# Patient Record
Sex: FEMALE
[De-identification: names, ages, dates, MRNs, and addresses within clinical notes are randomized per-mention and may not be internally consistent; named-entity substitution may affect disease eponyms.]

---

## 2017-03-07 ENCOUNTER — CHARTING TRANS (OUTPATIENT)
Dept: OTHER | Age: 57
End: 2017-03-07

## 2017-03-07 ENCOUNTER — CHARTING TRANS (OUTPATIENT)
Dept: FAMILY MEDICINE | Age: 57
End: 2017-03-07

## 2017-03-31 ENCOUNTER — IMAGING SERVICES (OUTPATIENT)
Dept: OTHER | Age: 57
End: 2017-03-31

## 2017-04-05 ENCOUNTER — IMAGING SERVICES (OUTPATIENT)
Dept: OTHER | Age: 57
End: 2017-04-05

## 2017-04-07 ENCOUNTER — LAB SERVICES (OUTPATIENT)
Dept: OTHER | Age: 57
End: 2017-04-07

## 2017-04-07 ENCOUNTER — CHARTING TRANS (OUTPATIENT)
Dept: OTHER | Age: 57
End: 2017-04-07

## 2017-04-08 ENCOUNTER — LAB SERVICES (OUTPATIENT)
Dept: OTHER | Age: 57
End: 2017-04-08

## 2017-04-08 LAB
25(OH)D3 SERPL-MCNC: 48.3 NG/ML (ref 30–100)
ALBUMIN SERPL BCG-MCNC: 4 G/DL (ref 3.6–5.1)
ALP SERPL-CCNC: 69 U/L (ref 45–105)
ALT SERPL W/O P-5'-P-CCNC: 27 U/L (ref 15–43)
AST SERPL-CCNC: 26 U/L (ref 14–43)
BASOPHIL %: 0.4 % (ref 0–1.2)
BASOPHIL ABSOLUTE #: 0 10*3/UL (ref 0–0.1)
BILIRUB SERPL-MCNC: 0.6 MG/DL (ref 0–1.3)
BUN SERPL-MCNC: 15 MG/DL (ref 7–20)
CALCIUM SERPL-MCNC: 9 MG/DL (ref 8.6–10.6)
CHLORIDE SERPL-SCNC: 106 MMOL/L (ref 96–107)
CHOLEST SERPL-MCNC: 170 MG/DL (ref 140–200)
CREATININE, SERUM: 0.7 MG/DL (ref 0.5–1.4)
DIFFERENTIAL TYPE: NORMAL
EOSINOPHIL %: 1.2 % (ref 0–10)
EOSINOPHIL ABSOLUTE #: 0.1 10*3/UL (ref 0–0.5)
GFR SERPL CREATININE-BSD FRML MDRD: >60 ML/MIN/{1.73M2}
GFR SERPL CREATININE-BSD FRML MDRD: >60 ML/MIN/{1.73M2}
GLUCOSE P FAST SERPL-MCNC: 91 MG/DL (ref 60–100)
HCO3 SERPL-SCNC: 29 MMOL/L (ref 22–32)
HDLC SERPL-MCNC: 53 MG/DL
HEMATOCRIT: 40.8 % (ref 34–45)
HEMOGLOBIN: 13.8 G/DL (ref 11.2–15.7)
LDLC SERPL CALC-MCNC: 104 MG/DL (ref 30–100)
LYMPH PERCENT: 39.2 % (ref 20.5–51.1)
LYMPHOCYTE ABSOLUTE #: 1.9 10*3/UL (ref 1.2–3.4)
MEAN CORPUSCULAR HGB CONCENTRATION: 33.8 % (ref 32–36)
MEAN CORPUSCULAR HGB: 27.5 PG (ref 27–34)
MEAN CORPUSCULAR VOLUME: 81.4 FL (ref 79–95)
MEAN PLATELET VOLUME: 10.1 FL (ref 8.6–12.4)
MONOCYTE ABSOLUTE #: 0.4 10*3/UL (ref 0.2–0.9)
MONOCYTE PERCENT: 7.5 % (ref 4.3–12.9)
NEUTROPHIL ABSOLUTE #: 2.5 10*3/UL (ref 1.4–6.5)
NEUTROPHIL PERCENT: 51.7 % (ref 34–73.5)
PLATELET COUNT: 213 10*3/UL (ref 150–400)
POTASSIUM SERPL-SCNC: 4.5 MMOL/L (ref 3.5–5.3)
PROT SERPL-MCNC: 7.1 G/DL (ref 6.4–8.5)
RED BLOOD CELL COUNT: 5.01 10*6/UL (ref 3.7–5.2)
RED CELL DISTRIBUTION WIDTH: 12.9 % (ref 11.3–14.8)
SODIUM SERPL-SCNC: 143 MMOL/L (ref 136–146)
TRIGL SERPL-MCNC: 66 MG/DL (ref 0–200)
TSH SERPL DL<=0.05 MIU/L-ACNC: 1.08 M[IU]/L (ref 0.3–4.82)
WHITE BLOOD CELL COUNT: 4.8 10*3/UL (ref 4–10)

## 2017-04-11 ENCOUNTER — CHARTING TRANS (OUTPATIENT)
Dept: OTHER | Age: 57
End: 2017-04-11

## 2017-04-11 LAB — AP REPORT: NORMAL

## 2017-04-25 ENCOUNTER — IMAGING SERVICES (OUTPATIENT)
Dept: OTHER | Age: 57
End: 2017-04-25

## 2017-05-24 ENCOUNTER — CHARTING TRANS (OUTPATIENT)
Dept: URGENT CARE | Age: 57
End: 2017-05-24

## 2017-06-22 ENCOUNTER — CHARTING TRANS (OUTPATIENT)
Dept: OTHER | Age: 57
End: 2017-06-22

## 2017-06-22 ENCOUNTER — LAB SERVICES (OUTPATIENT)
Dept: OTHER | Age: 57
End: 2017-06-22

## 2017-06-22 LAB
CRP SERPL-MCNC: <0.5 MG/DL (ref 0–1)
SEDIMENTATION RATE, RBC: 10 MM/H (ref 0–20)

## 2017-06-23 ENCOUNTER — CHARTING TRANS (OUTPATIENT)
Dept: OTHER | Age: 57
End: 2017-06-23

## 2017-06-23 ENCOUNTER — LAB SERVICES (OUTPATIENT)
Dept: OTHER | Age: 57
End: 2017-06-23

## 2017-06-26 LAB — H PYLORI AG STL QL IA: POSITIVE

## 2017-06-28 ENCOUNTER — CHARTING TRANS (OUTPATIENT)
Dept: OTHER | Age: 57
End: 2017-06-28

## 2017-10-06 ENCOUNTER — IMAGING SERVICES (OUTPATIENT)
Dept: OTHER | Age: 57
End: 2017-10-06

## 2017-10-06 ENCOUNTER — CHARTING TRANS (OUTPATIENT)
Dept: OTHER | Age: 57
End: 2017-10-06

## 2017-10-23 ENCOUNTER — CHARTING TRANS (OUTPATIENT)
Dept: OTOLARYNGOLOGY | Age: 57
End: 2017-10-23

## 2017-10-23 ENCOUNTER — CHARTING TRANS (OUTPATIENT)
Dept: OTHER | Age: 57
End: 2017-10-23

## 2017-11-03 ENCOUNTER — CHARTING TRANS (OUTPATIENT)
Dept: OTHER | Age: 57
End: 2017-11-03

## 2017-11-16 ENCOUNTER — CHARTING TRANS (OUTPATIENT)
Dept: SURGERY | Age: 57
End: 2017-11-16

## 2018-11-27 VITALS — BODY MASS INDEX: 27.48 KG/M2 | WEIGHT: 140 LBS | HEIGHT: 60 IN

## 2018-11-28 VITALS
HEART RATE: 68 BPM | TEMPERATURE: 99.1 F | WEIGHT: 140 LBS | HEIGHT: 59 IN | SYSTOLIC BLOOD PRESSURE: 118 MMHG | BODY MASS INDEX: 28.22 KG/M2 | DIASTOLIC BLOOD PRESSURE: 68 MMHG

## 2018-11-28 VITALS
HEART RATE: 68 BPM | DIASTOLIC BLOOD PRESSURE: 70 MMHG | WEIGHT: 145 LBS | HEIGHT: 60 IN | RESPIRATION RATE: 12 BRPM | SYSTOLIC BLOOD PRESSURE: 110 MMHG | BODY MASS INDEX: 28.47 KG/M2

## 2018-11-28 VITALS
OXYGEN SATURATION: 98 % | RESPIRATION RATE: 16 BRPM | SYSTOLIC BLOOD PRESSURE: 114 MMHG | DIASTOLIC BLOOD PRESSURE: 70 MMHG | HEART RATE: 68 BPM | TEMPERATURE: 98.5 F

## 2018-11-28 VITALS — WEIGHT: 140 LBS

## 2019-06-03 ENCOUNTER — APPOINTMENT (OUTPATIENT)
Dept: INTERNAL MEDICINE | Facility: CLINIC | Age: 59
End: 2019-06-03

## 2019-08-05 ENCOUNTER — RESULT REVIEW (OUTPATIENT)
Age: 59
End: 2019-08-05

## 2019-08-05 ENCOUNTER — INPATIENT (INPATIENT)
Facility: HOSPITAL | Age: 59
LOS: 1 days | Discharge: HOME | End: 2019-08-07
Attending: SURGERY | Admitting: SURGERY
Payer: MEDICAID

## 2019-08-05 VITALS
DIASTOLIC BLOOD PRESSURE: 67 MMHG | TEMPERATURE: 98 F | HEART RATE: 68 BPM | RESPIRATION RATE: 18 BRPM | SYSTOLIC BLOOD PRESSURE: 119 MMHG | OXYGEN SATURATION: 99 %

## 2019-08-05 LAB
ALBUMIN SERPL ELPH-MCNC: 4.1 G/DL — SIGNIFICANT CHANGE UP (ref 3.5–5.2)
ALP SERPL-CCNC: 84 U/L — SIGNIFICANT CHANGE UP (ref 30–115)
ALT FLD-CCNC: 90 U/L — HIGH (ref 0–41)
ANION GAP SERPL CALC-SCNC: 14 MMOL/L — SIGNIFICANT CHANGE UP (ref 7–14)
APPEARANCE UR: CLEAR — SIGNIFICANT CHANGE UP
APTT BLD: 26.2 SEC — LOW (ref 27–39.2)
AST SERPL-CCNC: 202 U/L — HIGH (ref 0–41)
BASOPHILS # BLD AUTO: 0.03 K/UL — SIGNIFICANT CHANGE UP (ref 0–0.2)
BASOPHILS NFR BLD AUTO: 0.4 % — SIGNIFICANT CHANGE UP (ref 0–1)
BILIRUB DIRECT SERPL-MCNC: 0.3 MG/DL — HIGH (ref 0–0.2)
BILIRUB INDIRECT FLD-MCNC: 0.6 MG/DL — SIGNIFICANT CHANGE UP (ref 0.2–1.2)
BILIRUB SERPL-MCNC: 0.9 MG/DL — SIGNIFICANT CHANGE UP (ref 0.2–1.2)
BILIRUB UR-MCNC: NEGATIVE — SIGNIFICANT CHANGE UP
BLD GP AB SCN SERPL QL: SIGNIFICANT CHANGE UP
BUN SERPL-MCNC: 12 MG/DL — SIGNIFICANT CHANGE UP (ref 10–20)
CALCIUM SERPL-MCNC: 9.1 MG/DL — SIGNIFICANT CHANGE UP (ref 8.5–10.1)
CHLORIDE SERPL-SCNC: 103 MMOL/L — SIGNIFICANT CHANGE UP (ref 98–110)
CO2 SERPL-SCNC: 23 MMOL/L — SIGNIFICANT CHANGE UP (ref 17–32)
COLOR SPEC: SIGNIFICANT CHANGE UP
CREAT SERPL-MCNC: 0.8 MG/DL — SIGNIFICANT CHANGE UP (ref 0.7–1.5)
DIFF PNL FLD: NEGATIVE — SIGNIFICANT CHANGE UP
EOSINOPHIL # BLD AUTO: 0.01 K/UL — SIGNIFICANT CHANGE UP (ref 0–0.7)
EOSINOPHIL NFR BLD AUTO: 0.1 % — SIGNIFICANT CHANGE UP (ref 0–8)
GLUCOSE SERPL-MCNC: 130 MG/DL — HIGH (ref 70–99)
GLUCOSE UR QL: NEGATIVE — SIGNIFICANT CHANGE UP
HCT VFR BLD CALC: 39.9 % — SIGNIFICANT CHANGE UP (ref 37–47)
HGB BLD-MCNC: 12.8 G/DL — SIGNIFICANT CHANGE UP (ref 12–16)
IMM GRANULOCYTES NFR BLD AUTO: 0.3 % — SIGNIFICANT CHANGE UP (ref 0.1–0.3)
INR BLD: 0.99 RATIO — SIGNIFICANT CHANGE UP (ref 0.65–1.3)
KETONES UR-MCNC: NEGATIVE — SIGNIFICANT CHANGE UP
LEUKOCYTE ESTERASE UR-ACNC: NEGATIVE — SIGNIFICANT CHANGE UP
LIDOCAIN IGE QN: 24 U/L — SIGNIFICANT CHANGE UP (ref 7–60)
LYMPHOCYTES # BLD AUTO: 0.72 K/UL — LOW (ref 1.2–3.4)
LYMPHOCYTES # BLD AUTO: 10 % — LOW (ref 20.5–51.1)
MCHC RBC-ENTMCNC: 26.9 PG — LOW (ref 27–31)
MCHC RBC-ENTMCNC: 32.1 G/DL — SIGNIFICANT CHANGE UP (ref 32–37)
MCV RBC AUTO: 83.8 FL — SIGNIFICANT CHANGE UP (ref 81–99)
MONOCYTES # BLD AUTO: 0.31 K/UL — SIGNIFICANT CHANGE UP (ref 0.1–0.6)
MONOCYTES NFR BLD AUTO: 4.3 % — SIGNIFICANT CHANGE UP (ref 1.7–9.3)
NEUTROPHILS # BLD AUTO: 6.09 K/UL — SIGNIFICANT CHANGE UP (ref 1.4–6.5)
NEUTROPHILS NFR BLD AUTO: 84.9 % — HIGH (ref 42.2–75.2)
NITRITE UR-MCNC: NEGATIVE — SIGNIFICANT CHANGE UP
NRBC # BLD: 0 /100 WBCS — SIGNIFICANT CHANGE UP (ref 0–0)
PH UR: 7.5 — SIGNIFICANT CHANGE UP (ref 5–8)
PLATELET # BLD AUTO: 196 K/UL — SIGNIFICANT CHANGE UP (ref 130–400)
POTASSIUM SERPL-MCNC: 5 MMOL/L — SIGNIFICANT CHANGE UP (ref 3.5–5)
POTASSIUM SERPL-SCNC: 5 MMOL/L — SIGNIFICANT CHANGE UP (ref 3.5–5)
PROT SERPL-MCNC: 7.6 G/DL — SIGNIFICANT CHANGE UP (ref 6–8)
PROT UR-MCNC: NEGATIVE — SIGNIFICANT CHANGE UP
PROTHROM AB SERPL-ACNC: 11.4 SEC — SIGNIFICANT CHANGE UP (ref 9.95–12.87)
RBC # BLD: 4.76 M/UL — SIGNIFICANT CHANGE UP (ref 4.2–5.4)
RBC # FLD: 12.7 % — SIGNIFICANT CHANGE UP (ref 11.5–14.5)
SODIUM SERPL-SCNC: 140 MMOL/L — SIGNIFICANT CHANGE UP (ref 135–146)
SP GR SPEC: 1.01 — LOW (ref 1.01–1.02)
UROBILINOGEN FLD QL: SIGNIFICANT CHANGE UP
WBC # BLD: 7.18 K/UL — SIGNIFICANT CHANGE UP (ref 4.8–10.8)
WBC # FLD AUTO: 7.18 K/UL — SIGNIFICANT CHANGE UP (ref 4.8–10.8)

## 2019-08-05 PROCEDURE — 76705 ECHO EXAM OF ABDOMEN: CPT | Mod: 26

## 2019-08-05 PROCEDURE — 71045 X-RAY EXAM CHEST 1 VIEW: CPT | Mod: 26

## 2019-08-05 PROCEDURE — 88304 TISSUE EXAM BY PATHOLOGIST: CPT | Mod: 26

## 2019-08-05 PROCEDURE — 47562 LAPAROSCOPIC CHOLECYSTECTOMY: CPT

## 2019-08-05 PROCEDURE — 99285 EMERGENCY DEPT VISIT HI MDM: CPT

## 2019-08-05 PROCEDURE — 93010 ELECTROCARDIOGRAM REPORT: CPT

## 2019-08-05 PROCEDURE — 99223 1ST HOSP IP/OBS HIGH 75: CPT | Mod: 57

## 2019-08-05 RX ORDER — ACETAMINOPHEN 500 MG
650 TABLET ORAL ONCE
Refills: 0 | Status: DISCONTINUED | OUTPATIENT
Start: 2019-08-05 | End: 2019-08-06

## 2019-08-05 RX ORDER — MORPHINE SULFATE 50 MG/1
4 CAPSULE, EXTENDED RELEASE ORAL
Refills: 0 | Status: DISCONTINUED | OUTPATIENT
Start: 2019-08-05 | End: 2019-08-06

## 2019-08-05 RX ORDER — SODIUM CHLORIDE 9 MG/ML
1000 INJECTION INTRAMUSCULAR; INTRAVENOUS; SUBCUTANEOUS ONCE
Refills: 0 | Status: COMPLETED | OUTPATIENT
Start: 2019-08-05 | End: 2019-08-05

## 2019-08-05 RX ORDER — MORPHINE SULFATE 50 MG/1
2 CAPSULE, EXTENDED RELEASE ORAL EVERY 6 HOURS
Refills: 0 | Status: DISCONTINUED | OUTPATIENT
Start: 2019-08-05 | End: 2019-08-07

## 2019-08-05 RX ORDER — IBUPROFEN 200 MG
600 TABLET ORAL EVERY 8 HOURS
Refills: 0 | Status: DISCONTINUED | OUTPATIENT
Start: 2019-08-05 | End: 2019-08-05

## 2019-08-05 RX ORDER — OXYCODONE AND ACETAMINOPHEN 5; 325 MG/1; MG/1
1 TABLET ORAL ONCE
Refills: 0 | Status: DISCONTINUED | OUTPATIENT
Start: 2019-08-05 | End: 2019-08-06

## 2019-08-05 RX ORDER — CIPROFLOXACIN LACTATE 400MG/40ML
400 VIAL (ML) INTRAVENOUS EVERY 12 HOURS
Refills: 0 | Status: CANCELLED | OUTPATIENT
Start: 2019-08-06 | End: 2019-08-05

## 2019-08-05 RX ORDER — ONDANSETRON 8 MG/1
4 TABLET, FILM COATED ORAL EVERY 4 HOURS
Refills: 0 | Status: DISCONTINUED | OUTPATIENT
Start: 2019-08-05 | End: 2019-08-06

## 2019-08-05 RX ORDER — SODIUM CHLORIDE 9 MG/ML
1000 INJECTION, SOLUTION INTRAVENOUS
Refills: 0 | Status: DISCONTINUED | OUTPATIENT
Start: 2019-08-05 | End: 2019-08-06

## 2019-08-05 RX ORDER — SODIUM CHLORIDE 9 MG/ML
1000 INJECTION, SOLUTION INTRAVENOUS
Refills: 0 | Status: DISCONTINUED | OUTPATIENT
Start: 2019-08-05 | End: 2019-08-05

## 2019-08-05 RX ORDER — METRONIDAZOLE 500 MG
500 TABLET ORAL EVERY 8 HOURS
Refills: 0 | Status: DISCONTINUED | OUTPATIENT
Start: 2019-08-05 | End: 2019-08-05

## 2019-08-05 RX ORDER — CIPROFLOXACIN LACTATE 400MG/40ML
VIAL (ML) INTRAVENOUS
Refills: 0 | Status: DISCONTINUED | OUTPATIENT
Start: 2019-08-05 | End: 2019-08-05

## 2019-08-05 RX ORDER — ONDANSETRON 8 MG/1
4 TABLET, FILM COATED ORAL ONCE
Refills: 0 | Status: COMPLETED | OUTPATIENT
Start: 2019-08-05 | End: 2019-08-05

## 2019-08-05 RX ORDER — METRONIDAZOLE 500 MG
500 TABLET ORAL ONCE
Refills: 0 | Status: COMPLETED | OUTPATIENT
Start: 2019-08-05 | End: 2019-08-05

## 2019-08-05 RX ORDER — ACETAMINOPHEN 500 MG
650 TABLET ORAL EVERY 6 HOURS
Refills: 0 | Status: DISCONTINUED | OUTPATIENT
Start: 2019-08-05 | End: 2019-08-07

## 2019-08-05 RX ORDER — HEPARIN SODIUM 5000 [USP'U]/ML
5000 INJECTION INTRAVENOUS; SUBCUTANEOUS EVERY 8 HOURS
Refills: 0 | Status: DISCONTINUED | OUTPATIENT
Start: 2019-08-05 | End: 2019-08-07

## 2019-08-05 RX ORDER — SENNA PLUS 8.6 MG/1
2 TABLET ORAL AT BEDTIME
Refills: 0 | Status: DISCONTINUED | OUTPATIENT
Start: 2019-08-05 | End: 2019-08-07

## 2019-08-05 RX ORDER — HEPARIN SODIUM 5000 [USP'U]/ML
5000 INJECTION INTRAVENOUS; SUBCUTANEOUS EVERY 8 HOURS
Refills: 0 | Status: DISCONTINUED | OUTPATIENT
Start: 2019-08-05 | End: 2019-08-05

## 2019-08-05 RX ORDER — MORPHINE SULFATE 50 MG/1
4 CAPSULE, EXTENDED RELEASE ORAL EVERY 4 HOURS
Refills: 0 | Status: DISCONTINUED | OUTPATIENT
Start: 2019-08-05 | End: 2019-08-05

## 2019-08-05 RX ORDER — IBUPROFEN 200 MG
400 TABLET ORAL EVERY 6 HOURS
Refills: 0 | Status: DISCONTINUED | OUTPATIENT
Start: 2019-08-05 | End: 2019-08-07

## 2019-08-05 RX ORDER — MORPHINE SULFATE 50 MG/1
4 CAPSULE, EXTENDED RELEASE ORAL ONCE
Refills: 0 | Status: DISCONTINUED | OUTPATIENT
Start: 2019-08-05 | End: 2019-08-05

## 2019-08-05 RX ORDER — CIPROFLOXACIN LACTATE 400MG/40ML
400 VIAL (ML) INTRAVENOUS ONCE
Refills: 0 | Status: COMPLETED | OUTPATIENT
Start: 2019-08-05 | End: 2019-08-05

## 2019-08-05 RX ORDER — METRONIDAZOLE 500 MG
TABLET ORAL
Refills: 0 | Status: DISCONTINUED | OUTPATIENT
Start: 2019-08-05 | End: 2019-08-05

## 2019-08-05 RX ORDER — DOCUSATE SODIUM 100 MG
100 CAPSULE ORAL THREE TIMES A DAY
Refills: 0 | Status: DISCONTINUED | OUTPATIENT
Start: 2019-08-05 | End: 2019-08-07

## 2019-08-05 RX ORDER — ONDANSETRON 8 MG/1
4 TABLET, FILM COATED ORAL EVERY 6 HOURS
Refills: 0 | Status: DISCONTINUED | OUTPATIENT
Start: 2019-08-05 | End: 2019-08-05

## 2019-08-05 RX ORDER — ACETAMINOPHEN 500 MG
650 TABLET ORAL EVERY 6 HOURS
Refills: 0 | Status: DISCONTINUED | OUTPATIENT
Start: 2019-08-05 | End: 2019-08-05

## 2019-08-05 RX ADMIN — Medication 100 MILLIGRAM(S): at 18:49

## 2019-08-05 RX ADMIN — ONDANSETRON 4 MILLIGRAM(S): 8 TABLET, FILM COATED ORAL at 10:50

## 2019-08-05 RX ADMIN — MORPHINE SULFATE 4 MILLIGRAM(S): 50 CAPSULE, EXTENDED RELEASE ORAL at 11:15

## 2019-08-05 RX ADMIN — SODIUM CHLORIDE 100 MILLILITER(S): 9 INJECTION, SOLUTION INTRAVENOUS at 22:53

## 2019-08-05 RX ADMIN — SODIUM CHLORIDE 1000 MILLILITER(S): 9 INJECTION INTRAMUSCULAR; INTRAVENOUS; SUBCUTANEOUS at 10:51

## 2019-08-05 RX ADMIN — SODIUM CHLORIDE 100 MILLILITER(S): 9 INJECTION, SOLUTION INTRAVENOUS at 18:49

## 2019-08-05 RX ADMIN — Medication 200 MILLIGRAM(S): at 18:49

## 2019-08-05 RX ADMIN — MORPHINE SULFATE 4 MILLIGRAM(S): 50 CAPSULE, EXTENDED RELEASE ORAL at 10:51

## 2019-08-05 RX ADMIN — MORPHINE SULFATE 4 MILLIGRAM(S): 50 CAPSULE, EXTENDED RELEASE ORAL at 23:39

## 2019-08-05 RX ADMIN — MORPHINE SULFATE 4 MILLIGRAM(S): 50 CAPSULE, EXTENDED RELEASE ORAL at 23:55

## 2019-08-05 NOTE — ED PROVIDER NOTE - PHYSICAL EXAMINATION
Vital Signs: I have reviewed the initial vital signs.  Constitutional: NAD, well-nourished, appears stated age, no acute distress.  HEENT: Airway patent, moist MM, no erythema/swelling/deformity of oral structures. EOMI, PERRLA.  CV: regular rate, regular rhythm, well-perfused extremities, 2+ b/l DP and radial pulses equal.  Lungs: BCTA, no increased WOB.  ABD: (+) RUQ tenderness with (+) michel sign, no guarding or rebound, no pulsatile mass, no hernias.   MSK: Neck supple, nontender, nl ROM, no stepoff. Chest nontender. Back nontender in TLS spine or to b/l bony structures or flanks. Ext nontender, nl rom, no deformity.   INTEG: Skin warm, dry, no rash.  NEURO: A&Ox3, normal strength, nl sensation throughout, normal speech.   PSYCH: Calm, cooperative, normal affect and interaction.

## 2019-08-05 NOTE — CHART NOTE - NSCHARTNOTEFT_GEN_A_CORE
PACU ANESTHESIA ADMISSION NOTE      Procedure: Laparoscopic cholecystectomy with intraoperative cholangiography    Post op diagnosis:  Acute cholecystitis      ____  Intubated  TV:______       Rate: ______      FiO2: ______    ___x_  Patent Airway    ____  Full return of protective reflexes    ____  Full recovery from anesthesia / back to baseline     Vitals:   T:     98      R:   12               BP:    128/91               Sat:  100%                  P:  92      Mental Status:  __x__ Awake   _____ Alert   _____ Drowsy   _____ Sedated    Nausea/Vomiting:  _x___ NO  ______Yes,   See Post - Op Orders          Pain Scale (0-10):  _____    Treatment: ____ None    ___x_ See Post - Op/PCA Orders    Post - Operative Fluids:   ____ Oral   ___x_ See Post - Op Orders    Plan: Discharge:   ____Home       __x___Floor     _____Critical Care    _____  Other:_________________    Comments: Uneventful intraoperative course. No anesthesia issues or complications noted.  Patient stable upon arrival to PACU. Report given to RN. Discharge when criteria met.

## 2019-08-05 NOTE — H&P ADULT - NSHPPHYSICALEXAM_GEN_ALL_CORE
PHYSICAL EXAM:    General: NAD, well-developed  Chest/Lung: Clear to auscultation bilaterally; No wheeze  Heart: Regular rate and rhythm; No murmurs, rubs, or gallops  Abdomen: Soft, mild RUQ, epigastric tenderness, Nondistended; Bowel sounds present  Psych: AAOx3  Skin: No rashes or lesions

## 2019-08-05 NOTE — ED PROVIDER NOTE - OBJECTIVE STATEMENT
58 year old female, denies pmhx, presenting with a several hour history of RUQ and epigastric abdominal pain that radiates to her back. States the pain is sharp, severe, no palliative or provocative factors. States she has had this before intermittently in the past s/p eating but never this severe. Associated with nausea but no vomiting. Otherwise denies fevers, headache, vision changes, weakness/numbness, confusion, URI symptoms, neck pain, chest pain, back pain, dyspnea, cough, palpitations, vomiting, diarrhea, constipation, blood in stool/dark stools, urinary symptoms, vaginal bleeding/discharge, leg swelling, rash, recent travel or sick contacts.

## 2019-08-05 NOTE — H&P ADULT - NSHPLABSRESULTS_GEN_ALL_CORE
Labs:  CAPILLARY BLOOD GLUCOSE               12.8   7.18  )-----------( 196      ( 05 Aug 2019 11:16 )             39.9       Auto Neutrophil %: 84.9 % (19 @ 11:16)  Auto Immature Granulocyte %: 0.3 % (19 @ 11:16)        140  |  103  |  12  ----------------------------<  130<H>  5.0   |  23  |  0.8      Calcium, Total Serum: 9.1 mg/dL (19 @ 10:20)      LFTs:             7.6  | 0.9  | 202      ------------------[84      ( 05 Aug 2019 10:20 )  4.1  | 0.3  | 90          Lipase:24       Urinalysis Basic - ( 05 Aug 2019 13:30 )    Color: Light Yellow / Appearance: Clear / S.009 / pH: x  Gluc: x / Ketone: Negative  / Bili: Negative / Urobili: <2 mg/dL   Blood: x / Protein: Negative / Nitrite: Negative   Leuk Esterase: Negative / RBC: x / WBC x   Sq Epi: x / Non Sq Epi: x / Bacteria: x    < from: US Abdomen Limited (19 @ 12:21) >    GALLBLADDER/BILIARY TREE:  Cholelithiasis. No wall thickening or   pericholecystic fluid.  Positive sonographic Orourke's sign (patient was   focally tender when scanning the gallbladder). No intrahepatic biliary   ductal dilatation. The common bile duct measures 4 mm, which is normal.   IMPRESSION:  Cholelithiasis with a positive sonographic Orourke's sign - however, given   no gallbladder wall thickening or pericholecystic fluid, findings are   equivocal for acute cholecystitis.    No biliary ductal dilatation.      < end of copied text >

## 2019-08-05 NOTE — H&P ADULT - HISTORY OF PRESENT ILLNESS
58F biliary colic, denies other PMH presenting to the ED with RUQ pain since 3AM accompanied by nausea. Per the patient, she has been having intermittent colicky RUQ pain since December which usually last 30 minutes then subside. She reports this episode being the worse occurrence.   In the ED, she states that her pain has improved since presentation, but has not completely diminished.

## 2019-08-05 NOTE — ED ADULT TRIAGE NOTE - ESI TRIAGE ACUITY LEVEL, MLM
----- Message from Jennifer Herring MD sent at 7/16/2019  8:12 PM CDT -----  Please call patient: Recent lipid panel is excellent.  Her LDL is only 43.  However, because of recurrence of coronary artery lesions requiring stenting, I would prefer to continue the same dose of atorvastatin 80 mg daily.  Thanks.  
Message given to patient  
3

## 2019-08-05 NOTE — H&P ADULT - ASSESSMENT
58F PMH biliary colic presenting to the ED with worsening RUQ pain since 3AM with mild transaminitis, RUQ u/s with positive sonographic Orourke's sign.    Plan:  - admit to surgery  - NPO, IVF, abx  - pain control  - preop for OR, lap roel w IOC, poss open  - DVT ppx  - trend LFTs

## 2019-08-05 NOTE — BRIEF OPERATIVE NOTE - OPERATION/FINDINGS
Acute cholecystitis, normal IOC without filling defects and with normal biliary anatomy with full visualization of biliary tree

## 2019-08-05 NOTE — ED PROVIDER NOTE - CLINICAL SUMMARY MEDICAL DECISION MAKING FREE TEXT BOX
Patient presented with RUQ abdominal pain, tender on exam but otherwise afebrile, HD stable, overall well appearing. Obtained labs which showed mildly elevated LFTs (AST > ALT) but normal alk phos. Otherwise no WBC count, normal lipase. RUQ US showed no gallbladder wall thickening or pericholecystic fluid, CBD not dilated, but (+) sonographic michel sign and therefore equivocal for cholecystitis. Spoke with surgery who came to see patient and they recommended admission to their service. Patient agreeable with plan. HD stable at time of admission.

## 2019-08-05 NOTE — ED PROVIDER NOTE - PROGRESS NOTE DETAILS
Patient seen and evaluated by me. Labs/imaging ordered. Started on IVF, Given morphine/zofran for symptomatic control. Will re-eval pending work up. RUQ US results back - patient feeling better after tx. Read states there is (+) cholelithiasis but no gallbladder wall thickening or pericholecystic fluid. There is a (+) michel sign and therefore saying it is equivocal for acute cholecystitis. Spoke with surgery who is going to see patient.

## 2019-08-05 NOTE — BRIEF OPERATIVE NOTE - NSICDXBRIEFPROCEDURE_GEN_ALL_CORE_FT
PROCEDURES:  Laparoscopic cholecystectomy with intraoperative cholangiography 05-Aug-2019 22:56:49  Art Crabtree

## 2019-08-06 ENCOUNTER — TRANSCRIPTION ENCOUNTER (OUTPATIENT)
Age: 59
End: 2019-08-06

## 2019-08-06 LAB
ALBUMIN SERPL ELPH-MCNC: 3.7 G/DL — SIGNIFICANT CHANGE UP (ref 3.5–5.2)
ALBUMIN SERPL ELPH-MCNC: 3.8 G/DL — SIGNIFICANT CHANGE UP (ref 3.5–5.2)
ALP SERPL-CCNC: 94 U/L — SIGNIFICANT CHANGE UP (ref 30–115)
ALP SERPL-CCNC: 97 U/L — SIGNIFICANT CHANGE UP (ref 30–115)
ALT FLD-CCNC: 442 U/L — HIGH (ref 0–41)
ALT FLD-CCNC: 442 U/L — HIGH (ref 0–41)
ANION GAP SERPL CALC-SCNC: 15 MMOL/L — HIGH (ref 7–14)
ANION GAP SERPL CALC-SCNC: 9 MMOL/L — SIGNIFICANT CHANGE UP (ref 7–14)
AST SERPL-CCNC: 448 U/L — HIGH (ref 0–41)
AST SERPL-CCNC: 580 U/L — HIGH (ref 0–41)
BASOPHILS # BLD AUTO: 0.01 K/UL — SIGNIFICANT CHANGE UP (ref 0–0.2)
BASOPHILS # BLD AUTO: 0.01 K/UL — SIGNIFICANT CHANGE UP (ref 0–0.2)
BASOPHILS NFR BLD AUTO: 0.1 % — SIGNIFICANT CHANGE UP (ref 0–1)
BASOPHILS NFR BLD AUTO: 0.2 % — SIGNIFICANT CHANGE UP (ref 0–1)
BILIRUB DIRECT SERPL-MCNC: 0.3 MG/DL — HIGH (ref 0–0.2)
BILIRUB DIRECT SERPL-MCNC: 0.4 MG/DL — HIGH (ref 0–0.2)
BILIRUB INDIRECT FLD-MCNC: 0.4 MG/DL — SIGNIFICANT CHANGE UP (ref 0.2–1.2)
BILIRUB INDIRECT FLD-MCNC: 0.7 MG/DL — SIGNIFICANT CHANGE UP (ref 0.2–1.2)
BILIRUB SERPL-MCNC: 0.7 MG/DL — SIGNIFICANT CHANGE UP (ref 0.2–1.2)
BILIRUB SERPL-MCNC: 1.1 MG/DL — SIGNIFICANT CHANGE UP (ref 0.2–1.2)
BUN SERPL-MCNC: 11 MG/DL — SIGNIFICANT CHANGE UP (ref 10–20)
BUN SERPL-MCNC: 8 MG/DL — LOW (ref 10–20)
CALCIUM SERPL-MCNC: 8.6 MG/DL — SIGNIFICANT CHANGE UP (ref 8.5–10.1)
CALCIUM SERPL-MCNC: 8.6 MG/DL — SIGNIFICANT CHANGE UP (ref 8.5–10.1)
CHLORIDE SERPL-SCNC: 106 MMOL/L — SIGNIFICANT CHANGE UP (ref 98–110)
CHLORIDE SERPL-SCNC: 108 MMOL/L — SIGNIFICANT CHANGE UP (ref 98–110)
CO2 SERPL-SCNC: 21 MMOL/L — SIGNIFICANT CHANGE UP (ref 17–32)
CO2 SERPL-SCNC: 25 MMOL/L — SIGNIFICANT CHANGE UP (ref 17–32)
CREAT SERPL-MCNC: 0.8 MG/DL — SIGNIFICANT CHANGE UP (ref 0.7–1.5)
CREAT SERPL-MCNC: 0.8 MG/DL — SIGNIFICANT CHANGE UP (ref 0.7–1.5)
EOSINOPHIL # BLD AUTO: 0 K/UL — SIGNIFICANT CHANGE UP (ref 0–0.7)
EOSINOPHIL # BLD AUTO: 0 K/UL — SIGNIFICANT CHANGE UP (ref 0–0.7)
EOSINOPHIL NFR BLD AUTO: 0 % — SIGNIFICANT CHANGE UP (ref 0–8)
EOSINOPHIL NFR BLD AUTO: 0 % — SIGNIFICANT CHANGE UP (ref 0–8)
GLUCOSE SERPL-MCNC: 139 MG/DL — HIGH (ref 70–99)
GLUCOSE SERPL-MCNC: 154 MG/DL — HIGH (ref 70–99)
HCT VFR BLD CALC: 38.3 % — SIGNIFICANT CHANGE UP (ref 37–47)
HCT VFR BLD CALC: 42.9 % — SIGNIFICANT CHANGE UP (ref 37–47)
HGB BLD-MCNC: 12.3 G/DL — SIGNIFICANT CHANGE UP (ref 12–16)
HGB BLD-MCNC: 13.6 G/DL — SIGNIFICANT CHANGE UP (ref 12–16)
IMM GRANULOCYTES NFR BLD AUTO: 0.2 % — SIGNIFICANT CHANGE UP (ref 0.1–0.3)
IMM GRANULOCYTES NFR BLD AUTO: 0.3 % — SIGNIFICANT CHANGE UP (ref 0.1–0.3)
LYMPHOCYTES # BLD AUTO: 0.79 K/UL — LOW (ref 1.2–3.4)
LYMPHOCYTES # BLD AUTO: 1.31 K/UL — SIGNIFICANT CHANGE UP (ref 1.2–3.4)
LYMPHOCYTES # BLD AUTO: 12 % — LOW (ref 20.5–51.1)
LYMPHOCYTES # BLD AUTO: 13.7 % — LOW (ref 20.5–51.1)
MAGNESIUM SERPL-MCNC: 2.2 MG/DL — SIGNIFICANT CHANGE UP (ref 1.8–2.4)
MCHC RBC-ENTMCNC: 26.9 PG — LOW (ref 27–31)
MCHC RBC-ENTMCNC: 27 PG — SIGNIFICANT CHANGE UP (ref 27–31)
MCHC RBC-ENTMCNC: 31.7 G/DL — LOW (ref 32–37)
MCHC RBC-ENTMCNC: 32.1 G/DL — SIGNIFICANT CHANGE UP (ref 32–37)
MCV RBC AUTO: 83.8 FL — SIGNIFICANT CHANGE UP (ref 81–99)
MCV RBC AUTO: 85.3 FL — SIGNIFICANT CHANGE UP (ref 81–99)
MONOCYTES # BLD AUTO: 0.12 K/UL — SIGNIFICANT CHANGE UP (ref 0.1–0.6)
MONOCYTES # BLD AUTO: 0.54 K/UL — SIGNIFICANT CHANGE UP (ref 0.1–0.6)
MONOCYTES NFR BLD AUTO: 1.8 % — SIGNIFICANT CHANGE UP (ref 1.7–9.3)
MONOCYTES NFR BLD AUTO: 5.6 % — SIGNIFICANT CHANGE UP (ref 1.7–9.3)
NEUTROPHILS # BLD AUTO: 5.65 K/UL — SIGNIFICANT CHANGE UP (ref 1.4–6.5)
NEUTROPHILS # BLD AUTO: 7.69 K/UL — HIGH (ref 1.4–6.5)
NEUTROPHILS NFR BLD AUTO: 80.3 % — HIGH (ref 42.2–75.2)
NEUTROPHILS NFR BLD AUTO: 85.8 % — HIGH (ref 42.2–75.2)
NRBC # BLD: 0 /100 WBCS — SIGNIFICANT CHANGE UP (ref 0–0)
NRBC # BLD: 0 /100 WBCS — SIGNIFICANT CHANGE UP (ref 0–0)
PHOSPHATE SERPL-MCNC: 2.6 MG/DL — SIGNIFICANT CHANGE UP (ref 2.1–4.9)
PLATELET # BLD AUTO: 162 K/UL — SIGNIFICANT CHANGE UP (ref 130–400)
PLATELET # BLD AUTO: 186 K/UL — SIGNIFICANT CHANGE UP (ref 130–400)
POTASSIUM SERPL-MCNC: 3.9 MMOL/L — SIGNIFICANT CHANGE UP (ref 3.5–5)
POTASSIUM SERPL-MCNC: 4.4 MMOL/L — SIGNIFICANT CHANGE UP (ref 3.5–5)
POTASSIUM SERPL-SCNC: 3.9 MMOL/L — SIGNIFICANT CHANGE UP (ref 3.5–5)
POTASSIUM SERPL-SCNC: 4.4 MMOL/L — SIGNIFICANT CHANGE UP (ref 3.5–5)
PROT SERPL-MCNC: 6.5 G/DL — SIGNIFICANT CHANGE UP (ref 6–8)
PROT SERPL-MCNC: 6.8 G/DL — SIGNIFICANT CHANGE UP (ref 6–8)
RBC # BLD: 4.57 M/UL — SIGNIFICANT CHANGE UP (ref 4.2–5.4)
RBC # BLD: 5.03 M/UL — SIGNIFICANT CHANGE UP (ref 4.2–5.4)
RBC # FLD: 13.1 % — SIGNIFICANT CHANGE UP (ref 11.5–14.5)
RBC # FLD: 13.2 % — SIGNIFICANT CHANGE UP (ref 11.5–14.5)
SODIUM SERPL-SCNC: 142 MMOL/L — SIGNIFICANT CHANGE UP (ref 135–146)
SODIUM SERPL-SCNC: 142 MMOL/L — SIGNIFICANT CHANGE UP (ref 135–146)
WBC # BLD: 6.58 K/UL — SIGNIFICANT CHANGE UP (ref 4.8–10.8)
WBC # BLD: 9.58 K/UL — SIGNIFICANT CHANGE UP (ref 4.8–10.8)
WBC # FLD AUTO: 6.58 K/UL — SIGNIFICANT CHANGE UP (ref 4.8–10.8)
WBC # FLD AUTO: 9.58 K/UL — SIGNIFICANT CHANGE UP (ref 4.8–10.8)

## 2019-08-06 RX ORDER — OXYCODONE AND ACETAMINOPHEN 5; 325 MG/1; MG/1
1 TABLET ORAL
Qty: 10 | Refills: 0
Start: 2019-08-06

## 2019-08-06 RX ORDER — DOCUSATE SODIUM 100 MG
1 CAPSULE ORAL
Qty: 15 | Refills: 0
Start: 2019-08-06 | End: 2019-08-10

## 2019-08-06 RX ADMIN — Medication 400 MILLIGRAM(S): at 00:02

## 2019-08-06 RX ADMIN — Medication 650 MILLIGRAM(S): at 11:12

## 2019-08-06 RX ADMIN — Medication 650 MILLIGRAM(S): at 00:23

## 2019-08-06 RX ADMIN — Medication 400 MILLIGRAM(S): at 17:59

## 2019-08-06 RX ADMIN — Medication 400 MILLIGRAM(S): at 05:24

## 2019-08-06 RX ADMIN — Medication 650 MILLIGRAM(S): at 23:16

## 2019-08-06 RX ADMIN — Medication 400 MILLIGRAM(S): at 11:42

## 2019-08-06 RX ADMIN — Medication 650 MILLIGRAM(S): at 11:42

## 2019-08-06 RX ADMIN — Medication 400 MILLIGRAM(S): at 23:16

## 2019-08-06 RX ADMIN — Medication 100 MILLIGRAM(S): at 05:25

## 2019-08-06 RX ADMIN — HEPARIN SODIUM 5000 UNIT(S): 5000 INJECTION INTRAVENOUS; SUBCUTANEOUS at 05:25

## 2019-08-06 RX ADMIN — Medication 650 MILLIGRAM(S): at 18:28

## 2019-08-06 RX ADMIN — HEPARIN SODIUM 5000 UNIT(S): 5000 INJECTION INTRAVENOUS; SUBCUTANEOUS at 13:59

## 2019-08-06 RX ADMIN — Medication 400 MILLIGRAM(S): at 18:28

## 2019-08-06 RX ADMIN — Medication 650 MILLIGRAM(S): at 00:03

## 2019-08-06 RX ADMIN — Medication 650 MILLIGRAM(S): at 17:58

## 2019-08-06 RX ADMIN — HEPARIN SODIUM 5000 UNIT(S): 5000 INJECTION INTRAVENOUS; SUBCUTANEOUS at 21:30

## 2019-08-06 RX ADMIN — Medication 100 MILLIGRAM(S): at 13:59

## 2019-08-06 RX ADMIN — Medication 400 MILLIGRAM(S): at 11:11

## 2019-08-06 RX ADMIN — Medication 400 MILLIGRAM(S): at 00:29

## 2019-08-06 RX ADMIN — Medication 650 MILLIGRAM(S): at 05:24

## 2019-08-06 RX ADMIN — Medication 100 MILLIGRAM(S): at 21:30

## 2019-08-06 NOTE — DISCHARGE NOTE PROVIDER - HOSPITAL COURSE
58F biliary colic, denies other PMH presenting to the ED with RUQ pain with nausea.  In the ED she was seen to have Cholelithiasis. No wall thickening or pericholecystic fluid.  Positive sonographic Orourke's sign.  She was made NPO and started on antibiotics and prepped for the OR on HOD1.  She was taken to the operating room for laparoscopic cholecystectomy with out complication.  She is doing well, tolerating diet , voiding, vitals and labs are stable. She is cleared for discharge home, to follow up in 1-2 weeks with . 58F biliary colic, denies other PMH presenting to the ED with RUQ pain with nausea.  In the ED she was seen to have Cholelithiasis. No wall thickening or pericholecystic fluid.  Positive sonographic Orourke's sign.  She was made NPO and started on antibiotics and prepped for the OR on HOD1.  She was taken to the operating room for laparoscopic cholecystectomy with out complication. She was monitored inpatient to assure her liver function tests were downtrending post operativley. She is doing well, tolerating diet , voiding, vitals and labs are stable. She is cleared for discharge home, to follow up in 1-2 weeks with .

## 2019-08-06 NOTE — PROGRESS NOTE ADULT - ASSESSMENT
A/P:  MAMTA FUENTES is a 58yFemale POD 1 from Laparoscopic cholecystectomy with intraoperative cholangiography.    Plan:   Regular diet  Ambulate OOB  Incentive spirometry  Anticipate discharge home today

## 2019-08-06 NOTE — DISCHARGE NOTE PROVIDER - NSDCCPTREATMENT_GEN_ALL_CORE_FT
PRINCIPAL PROCEDURE  Procedure: Laparoscopic cholecystectomy with intraoperative cholangiography  Findings and Treatment:       SECONDARY PROCEDURE  Procedure: Laparoscopic cholecystectomy with intraoperative cholangiography  Findings and Treatment:

## 2019-08-06 NOTE — DISCHARGE NOTE PROVIDER - NSDCFUADDINST_GEN_ALL_CORE_FT
You are being discharged from [facility]. Please follow up with Dr. Martin in 1-2 weeks, You may remove your dressing in 48 hours. Please avoid heavy weight lifting for the next 4-6 weeks.  If you have any further questions about your care, please do not hesitate to contact 's clinic.

## 2019-08-06 NOTE — DISCHARGE NOTE PROVIDER - CARE PROVIDER_API CALL
Dillan Martin)  Surgery  [address] [place] [address], [address] [place] [address]  Phone: [phone]  Fax: [phone]  Follow Up Time:

## 2019-08-06 NOTE — PROGRESS NOTE ADULT - ATTENDING COMMENTS
pt with acute cholecystitis  s/p lap roel/ioc  improving  pain under control  trending LFTs  will discharge when Lfts improve

## 2019-08-06 NOTE — CHART NOTE - NSCHARTNOTEFT_GEN_A_CORE
SURGERY POST-OP NOTE:    S: Patient underwent Laparoscopic cholecystectomy with intraoperative cholangiography and tolerated procedure without issue and sent to PACU. Patient denies chest pain, shortness of breath, nausea, vomiting, lightheadedness, or dizziness. Pain was well controlled.      O:  T(C): 36.3 (08-06-19 @ 07:35), Max: 37.2 (08-06-19 @ 04:45)  HR: 94 (08-06-19 @ 07:35) (75 - 94)  BP: 133/- (08-06-19 @ 07:35) (130/73 - 133/-)  RR: 18 (08-06-19 @ 07:35) (18 - 18)  SpO2: --  Wt(kg): --                        13.6   6.58  )-----------( 162      ( 06 Aug 2019 05:53 )             42.9        08-06    142  |  106  |  8<L>  ----------------------------<  154<H>  4.4   |  21  |  0.8    Ca    8.6      06 Aug 2019 05:53      Gen: NAD  Resp: Non-labored respirations  Abd: Soft, nontender, nondistended.  No palpable masses. Dressings are C/D/I.    Assessment/Plan:  58y Female now POD#0 s/p Laparoscopic cholecystectomy with intraoperative cholangiography      - Pain control  - Reg Diet  - DVT PPX  - Early ambulation  - Incentive spirometry

## 2019-08-06 NOTE — PROGRESS NOTE ADULT - SUBJECTIVE AND OBJECTIVE BOX
GENERAL SURGERY PROGRESS NOTE     MAMTA FUENTES  [address] [place] [address] day :1d  POD: 1  Procedure: Laparoscopic cholecystectomy with intraoperative cholangiography    Surgical Attending: Dillan Martin  Overnight events:  No acute overnight events. Tolerating diet without nausea or vomiting. Denies f/c/sob/n/v.    T(F): 97.3 (19 @ 07:35), Max: 98.9 (19 @ 15:30)  HR: 94 (19 @ 07:35) (70 - 94)  BP: 133/- (19 @ 07:35) (118/69 - 142/75)  ABP: --  ABP(mean): --  RR: 18 (19 @ 07:35) (13 - 20)  SpO2: 98% (19 @ 00:23) (97% - 100%)      19 @ 07:01  -  19 @ 07:00  --------------------------------------------------------  IN:  Total IN: 0 mL    OUT:    Voided: 450 mL  Total OUT: 450 mL    Total NET: -450 mL        DIET/FLUIDS:      GI proph:    AC/ proph: heparin  Injectable 5000 Unit(s) SubCutaneous every 8 hours    ABx:     PHYSICAL EXAM:  GENERAL: NAD, well-appearing  CHEST/LUNG: Clear to auscultation bilaterally  HEART: Regular rate and rhythm  ABDOMEN: Soft, Nondistended; Tenderness appropriate post-op. Dressings c/d/i  EXTREMITIES:  No clubbing, cyanosis, or edema      LABS  Labs:  CAPILLARY BLOOD GLUCOSE                              13.6   6.58  )-----------( 162      ( 06 Aug 2019 05:53 )             42.9       Auto Neutrophil %: 85.8 % (19 @ 05:53)  Auto Immature Granulocyte %: 0.2 % (19 @ 05:53)  Auto Neutrophil %: 84.9 % (19 @ 11:16)  Auto Immature Granulocyte %: 0.3 % (19 @ 11:16)        142  |  106  |  8<L>  ----------------------------<  154<H>  4.4   |  21  |  0.8      Calcium, Total Serum: 8.6 mg/dL (19 @ 05:53)      LFTs:             6.8  | 1.1  | 580      ------------------[94      ( 06 Aug 2019 05:53 )  3.8  | 0.4  | 442         Lipase:x      Amylase:x           Coags:     11.40  ----< 0.99    ( 05 Aug 2019 16:34 )     26.2          Urinalysis Basic - ( 05 Aug 2019 13:30 )    Color: Light Yellow / Appearance: Clear / S.009 / pH: x  Gluc: x / Ketone: Negative  / Bili: Negative / Urobili: <2 mg/dL   Blood: x / Protein: Negative / Nitrite: Negative   Leuk Esterase: Negative / RBC: x / WBC x   Sq Epi: x / Non Sq Epi: x / Bacteria: x            RADIOLOGY & ADDITIONAL TESTS:  < from: Xray Chest 1 View-PORTABLE IMMEDIATE (19 @ 15:57) >  No radiographic evidence of acute cardiopulmonary disease.    < end of copied text >

## 2019-08-07 ENCOUNTER — TRANSCRIPTION ENCOUNTER (OUTPATIENT)
Age: 59
End: 2019-08-07

## 2019-08-07 VITALS
RESPIRATION RATE: 18 BRPM | SYSTOLIC BLOOD PRESSURE: 122 MMHG | TEMPERATURE: 98 F | DIASTOLIC BLOOD PRESSURE: 68 MMHG | HEART RATE: 83 BPM

## 2019-08-07 LAB
ALBUMIN SERPL ELPH-MCNC: 3.6 G/DL — SIGNIFICANT CHANGE UP (ref 3.5–5.2)
ALP SERPL-CCNC: 102 U/L — SIGNIFICANT CHANGE UP (ref 30–115)
ALT FLD-CCNC: 363 U/L — HIGH (ref 0–41)
ANION GAP SERPL CALC-SCNC: 9 MMOL/L — SIGNIFICANT CHANGE UP (ref 7–14)
AST SERPL-CCNC: 225 U/L — HIGH (ref 0–41)
BILIRUB DIRECT SERPL-MCNC: 0.2 MG/DL — SIGNIFICANT CHANGE UP (ref 0–0.2)
BILIRUB INDIRECT FLD-MCNC: 0.3 MG/DL — SIGNIFICANT CHANGE UP (ref 0.2–1.2)
BILIRUB SERPL-MCNC: 0.5 MG/DL — SIGNIFICANT CHANGE UP (ref 0.2–1.2)
BUN SERPL-MCNC: 10 MG/DL — SIGNIFICANT CHANGE UP (ref 10–20)
CALCIUM SERPL-MCNC: 8.5 MG/DL — SIGNIFICANT CHANGE UP (ref 8.5–10.1)
CHLORIDE SERPL-SCNC: 110 MMOL/L — SIGNIFICANT CHANGE UP (ref 98–110)
CO2 SERPL-SCNC: 27 MMOL/L — SIGNIFICANT CHANGE UP (ref 17–32)
CREAT SERPL-MCNC: 0.7 MG/DL — SIGNIFICANT CHANGE UP (ref 0.7–1.5)
GLUCOSE SERPL-MCNC: 101 MG/DL — HIGH (ref 70–99)
POTASSIUM SERPL-MCNC: 3.9 MMOL/L — SIGNIFICANT CHANGE UP (ref 3.5–5)
POTASSIUM SERPL-SCNC: 3.9 MMOL/L — SIGNIFICANT CHANGE UP (ref 3.5–5)
PROT SERPL-MCNC: 6.4 G/DL — SIGNIFICANT CHANGE UP (ref 6–8)
SODIUM SERPL-SCNC: 146 MMOL/L — SIGNIFICANT CHANGE UP (ref 135–146)

## 2019-08-07 RX ORDER — METRONIDAZOLE 500 MG
500 TABLET ORAL EVERY 8 HOURS
Refills: 0 | Status: DISCONTINUED | OUTPATIENT
Start: 2019-08-07 | End: 2019-08-07

## 2019-08-07 RX ORDER — CIPROFLOXACIN LACTATE 400MG/40ML
200 VIAL (ML) INTRAVENOUS EVERY 12 HOURS
Refills: 0 | Status: DISCONTINUED | OUTPATIENT
Start: 2019-08-07 | End: 2019-08-07

## 2019-08-07 RX ORDER — SODIUM,POTASSIUM PHOSPHATES 278-250MG
1 POWDER IN PACKET (EA) ORAL ONCE
Refills: 0 | Status: COMPLETED | OUTPATIENT
Start: 2019-08-07 | End: 2019-08-07

## 2019-08-07 RX ADMIN — Medication 1 PACKET(S): at 05:28

## 2019-08-07 RX ADMIN — HEPARIN SODIUM 5000 UNIT(S): 5000 INJECTION INTRAVENOUS; SUBCUTANEOUS at 05:30

## 2019-08-07 RX ADMIN — Medication 650 MILLIGRAM(S): at 11:41

## 2019-08-07 RX ADMIN — Medication 100 MILLIGRAM(S): at 05:30

## 2019-08-07 RX ADMIN — Medication 650 MILLIGRAM(S): at 05:30

## 2019-08-07 RX ADMIN — Medication 400 MILLIGRAM(S): at 11:40

## 2019-08-07 RX ADMIN — Medication 100 MILLIGRAM(S): at 06:11

## 2019-08-07 RX ADMIN — Medication 100 MILLIGRAM(S): at 05:46

## 2019-08-07 RX ADMIN — Medication 650 MILLIGRAM(S): at 11:40

## 2019-08-07 RX ADMIN — Medication 400 MILLIGRAM(S): at 05:29

## 2019-08-07 NOTE — PROGRESS NOTE ADULT - SUBJECTIVE AND OBJECTIVE BOX
GENERAL SURGERY PROGRESS NOTE     MAMTA FUENTES  [address] [place] [address] day :2d  POD:  Procedure: Laparoscopic cholecystectomy with intraoperative cholangiography  Laparoscopic cholecystectomy with intraoperative cholangiography    Surgical Attending: Dillan Martin  Overnight events: No acute events overnight, doing well s/p lap roel, tolerating diet.     T(F): 97.9 (19 @ 23:00), Max: 98.7 (19 @ 16:11)  HR: 81 (19 @ 23:00) (81 - 94)  BP: 122/70 (19 @ 23:00) (102/58 - 133/-)  ABP: --  ABP(mean): --  RR: 18 (19 @ 23:00) (18 - 18)  SpO2: 95% (19 @ 13:00) (95% - 95%)      19 @ 07:01  -  19 @ 07:00  --------------------------------------------------------  IN:  Total IN: 0 mL    OUT:    Voided: 450 mL  Total OUT: 450 mL    Total NET: -450 mL      19 @ 07:01  -  19 @ 05:28  --------------------------------------------------------  IN:    Oral Fluid: 960 mL  Total IN: 960 mL    OUT:    Voided: 300 mL  Total OUT: 300 mL    Total NET: 660 mL        DIET/FLUIDS: potassium phosphate / sodium phosphate powder 1 Packet(s) Oral once    NG:                                                                                DRAINS:     BM:     EMESIS:     URINE:      GI proph:    AC/ proph: heparin  Injectable 5000 Unit(s) SubCutaneous every 8 hours    ABx: ciprofloxacin   IVPB 200 milliGRAM(s) IV Intermittent every 12 hours  metroNIDAZOLE  IVPB 500 milliGRAM(s) IV Intermittent every 8 hours      PHYSICAL EXAM:  GENERAL: NAD, well-appearing  CHEST/LUNG: Clear to auscultation bilaterally  HEART: Regular rate and rhythm  ABDOMEN: non tender, incisions CDI.   EXTREMITIES:  No clubbing, cyanosis, or edema      LABS  Labs:  CAPILLARY BLOOD GLUCOSE                              12.3   9.58  )-----------( 186      ( 06 Aug 2019 18:01 )             38.3       Auto Neutrophil %: 80.3 % (19 @ 18:01)  Auto Immature Granulocyte %: 0.3 % (19 @ 18:01)  Auto Neutrophil %: 85.8 % (19 @ 05:53)  Auto Immature Granulocyte %: 0.2 % (19 @ 05:53)        142  |  108  |  11  ----------------------------<  139<H>  3.9   |  25  |  0.8      Calcium, Total Serum: 8.6 mg/dL (19 @ 18:01)      LFTs:             6.5  | 0.7  | 448      ------------------[97      ( 06 Aug 2019 18:01 )  3.7  | 0.3  | 442         Lipase:x      Amylase:x             Coags:     11.40  ----< 0.99    ( 05 Aug 2019 16:34 )     26.2                Urinalysis Basic - ( 05 Aug 2019 13:30 )    Color: Light Yellow / Appearance: Clear / S.009 / pH: x  Gluc: x / Ketone: Negative  / Bili: Negative / Urobili: <2 mg/dL   Blood: x / Protein: Negative / Nitrite: Negative   Leuk Esterase: Negative / RBC: x / WBC x   Sq Epi: x / Non Sq Epi: x / Bacteria: x

## 2019-08-07 NOTE — DISCHARGE NOTE NURSING/CASE MANAGEMENT/SOCIAL WORK - NSDCDPATPORTLINK_GEN_ALL_CORE
You can access the [facility] Portal, offered by [facility], by registering with the following website: [email or web address]

## 2019-08-08 LAB — SURGICAL PATHOLOGY STUDY: SIGNIFICANT CHANGE UP

## 2019-08-13 DIAGNOSIS — Z88.0 ALLERGY STATUS TO PENICILLIN: ICD-10-CM

## 2019-08-13 DIAGNOSIS — K80.00 CALCULUS OF GALLBLADDER WITH ACUTE CHOLECYSTITIS WITHOUT OBSTRUCTION: ICD-10-CM

## 2019-08-13 DIAGNOSIS — R74.0 NONSPECIFIC ELEVATION OF LEVELS OF TRANSAMINASE AND LACTIC ACID DEHYDROGENASE [LDH]: ICD-10-CM

## 2019-08-21 ENCOUNTER — LABORATORY RESULT (OUTPATIENT)
Age: 59
End: 2019-08-21

## 2019-08-21 ENCOUNTER — OUTPATIENT (OUTPATIENT)
Dept: OUTPATIENT SERVICES | Facility: HOSPITAL | Age: 59
LOS: 1 days | Discharge: HOME | End: 2019-08-21

## 2019-08-21 ENCOUNTER — APPOINTMENT (OUTPATIENT)
Dept: SURGERY | Facility: CLINIC | Age: 59
End: 2019-08-21
Payer: MEDICAID

## 2019-08-21 VITALS
WEIGHT: 169 LBS | DIASTOLIC BLOOD PRESSURE: 78 MMHG | SYSTOLIC BLOOD PRESSURE: 118 MMHG | BODY MASS INDEX: 31.91 KG/M2 | HEIGHT: 61 IN

## 2019-08-21 DIAGNOSIS — R30.0 DYSURIA: ICD-10-CM

## 2019-08-21 DIAGNOSIS — R10.9 UNSPECIFIED ABDOMINAL PAIN: ICD-10-CM

## 2019-08-21 PROCEDURE — 99024 POSTOP FOLLOW-UP VISIT: CPT

## 2019-08-22 ENCOUNTER — LABORATORY RESULT (OUTPATIENT)
Age: 59
End: 2019-08-22

## 2019-08-28 NOTE — ASSESSMENT
[FreeTextEntry1] : Pricilla is a 58 year old lady who was admitted for acute cholecystitis. She under went cholecystectomy and was discharged home. She was doing better but then started to have abdominal pain. \par She notes the pain is worse on eating. \par She has normal color stool and urine. \par \par Exam : Epigastric pain\par \par Likely gastritis\par Will send blood work, ua with stool for H Pylori\par Will start on PPI.

## 2019-08-28 NOTE — HISTORY OF PRESENT ILLNESS
[de-identified] : Pricilla is a 58 year old lady who was admitted for acute cholecystitis. She under went cholecystectomy and was discharged home. She was doing better but then started to have abdominal pain. \par She notes the pain is worse on eating. \par She has normal color stool and urine.

## 2019-08-28 NOTE — PHYSICAL EXAM
[JVD] : no jugular venous distention  [Alert] : alert [Purpura] : no purpura  [Calm] : calm [de-identified] : Normal [de-identified] : Normal [de-identified] : epigastric pain

## 2019-09-04 ENCOUNTER — APPOINTMENT (OUTPATIENT)
Dept: SURGERY | Facility: CLINIC | Age: 59
End: 2019-09-04
Payer: MEDICAID

## 2019-09-04 VITALS
BODY MASS INDEX: 31.91 KG/M2 | HEIGHT: 61 IN | SYSTOLIC BLOOD PRESSURE: 119 MMHG | WEIGHT: 169 LBS | DIASTOLIC BLOOD PRESSURE: 68 MMHG

## 2019-09-04 PROCEDURE — 99213 OFFICE O/P EST LOW 20 MIN: CPT

## 2019-09-05 NOTE — PHYSICAL EXAM
[JVD] : no jugular venous distention  [Purpura] : no purpura  [Alert] : alert [Calm] : calm [de-identified] : Normal [de-identified] : Normal [de-identified] : epigastric pain

## 2019-09-05 NOTE — HISTORY OF PRESENT ILLNESS
[de-identified] : Pricilla is a 58 year old lady who was admitted for acute cholecystitis. She under went cholecystectomy and was discharged home. She was doing better but then started to have abdominal pain. \par She notes the pain is worse on eating. \par She has normal color stool and urine. \par PPI has improved her symptoms,. \par We had send blood works - which are normal \par Her HPylori test is positive.

## 2019-09-05 NOTE — ASSESSMENT
[FreeTextEntry1] : Pricilla is a 58 year old lady who was admitted for acute cholecystitis. She under went cholecystectomy and was discharged home. She was doing better but then started to have abdominal pain. \par She notes the pain is worse on eating. \par She has normal color stool and urine. \par PPI has improved her symptoms,. \par We had send blood works - which are normal \par Her HPylori test is positive. \par Exam : Epigastric pain\par \par Likely gastritis\par We started her on Triple Therapy. \par \par The Post operative care was explained to the patient. She was counselled on diet , exercise and wound care.\par We discussed the pathology and surgery with her.\par The Procedure was explained to the patient. The Risk , benefit and alternatives were discussed. We discussed recovery and possible complications.\par \par We discussed the importance of close follow up. \par We informed that she needs to follow up in  1 month. \par We also informed that she can call us if anything changes or has any questions.\par

## 2019-10-02 ENCOUNTER — APPOINTMENT (OUTPATIENT)
Dept: SURGERY | Facility: CLINIC | Age: 59
End: 2019-10-02
Payer: MEDICAID

## 2019-10-02 VITALS
DIASTOLIC BLOOD PRESSURE: 72 MMHG | HEIGHT: 61 IN | WEIGHT: 167 LBS | SYSTOLIC BLOOD PRESSURE: 124 MMHG | BODY MASS INDEX: 31.53 KG/M2

## 2019-10-02 PROCEDURE — 99213 OFFICE O/P EST LOW 20 MIN: CPT

## 2019-10-02 PROCEDURE — 99024 POSTOP FOLLOW-UP VISIT: CPT

## 2019-10-04 NOTE — PHYSICAL EXAM
[JVD] : no jugular venous distention  [Purpura] : no purpura  [Alert] : alert [Calm] : calm [de-identified] : Normal [de-identified] : Normal [de-identified] : epigastric pain

## 2019-10-04 NOTE — ASSESSMENT
[FreeTextEntry1] : Pricilla is a 58 year old lady who was admitted for acute cholecystitis. She under went cholecystectomy and was discharged home. She was doing better but then started to have abdominal pain. \par She notes the pain is worse on eating. \par She has normal color stool and urine. \par PPI has improved her symptoms,. \par We had send blood works - which are normal \par Her HPylori test is positive. \par We had started her on Hpylori therapy .\par Her pain has improved. \par \par Exam : Epigastric pain\par \par Likely gastritis\par will send her for urea breath test\par \par The Post operative care was explained to the patient. She was counselled on diet , exercise and wound care.\par We discussed the pathology and surgery with her.\par The Procedure was explained to the patient. The Risk , benefit and alternatives were discussed. We discussed recovery and possible complications.\par \par We discussed the importance of close follow up. \par We informed that she needs to follow up in  1 month. \par We also informed that she can call us if anything changes or has any questions.\par

## 2019-10-04 NOTE — HISTORY OF PRESENT ILLNESS
[de-identified] : Pricilla is a 58 year old lady who was admitted for acute cholecystitis. She under went cholecystectomy and was discharged home. She was doing better but then started to have abdominal pain. \par She notes the pain is worse on eating. \par She has normal color stool and urine. \par PPI has improved her symptoms,. \par We had send blood works - which are normal \par Her HPylori test is positive. \par We had started her on Hpylori therapy .\par Her pain has improved. \par

## 2019-10-04 NOTE — PLAN
[FreeTextEntry1] : We discussed the importance of close follow up. \par We informed that she needs to follow up in 2- 4 weeks\par We also informed that she can call us if anything changes or has any questions.\par

## 2019-11-06 ENCOUNTER — LABORATORY RESULT (OUTPATIENT)
Age: 59
End: 2019-11-06

## 2019-11-06 ENCOUNTER — APPOINTMENT (OUTPATIENT)
Dept: SURGERY | Facility: CLINIC | Age: 59
End: 2019-11-06
Payer: MEDICAID

## 2019-11-06 ENCOUNTER — OUTPATIENT (OUTPATIENT)
Dept: OUTPATIENT SERVICES | Facility: HOSPITAL | Age: 59
LOS: 1 days | Discharge: HOME | End: 2019-11-06

## 2019-11-06 VITALS
HEIGHT: 61 IN | SYSTOLIC BLOOD PRESSURE: 110 MMHG | TEMPERATURE: 97.6 F | DIASTOLIC BLOOD PRESSURE: 70 MMHG | WEIGHT: 167 LBS | HEART RATE: 73 BPM | BODY MASS INDEX: 31.53 KG/M2

## 2019-11-06 DIAGNOSIS — R10.9 UNSPECIFIED ABDOMINAL PAIN: ICD-10-CM

## 2019-11-06 PROCEDURE — 99213 OFFICE O/P EST LOW 20 MIN: CPT

## 2019-11-06 PROCEDURE — 99024 POSTOP FOLLOW-UP VISIT: CPT

## 2019-11-14 ENCOUNTER — FORM ENCOUNTER (OUTPATIENT)
Age: 59
End: 2019-11-14

## 2019-11-15 ENCOUNTER — OUTPATIENT (OUTPATIENT)
Dept: OUTPATIENT SERVICES | Facility: HOSPITAL | Age: 59
LOS: 1 days | Discharge: HOME | End: 2019-11-15
Payer: MEDICAID

## 2019-11-15 DIAGNOSIS — R10.9 UNSPECIFIED ABDOMINAL PAIN: ICD-10-CM

## 2019-11-15 PROCEDURE — 76705 ECHO EXAM OF ABDOMEN: CPT | Mod: 26

## 2019-11-20 ENCOUNTER — OUTPATIENT (OUTPATIENT)
Dept: OUTPATIENT SERVICES | Facility: HOSPITAL | Age: 59
LOS: 1 days | Discharge: HOME | End: 2019-11-20

## 2019-11-20 ENCOUNTER — APPOINTMENT (OUTPATIENT)
Dept: INTERNAL MEDICINE | Facility: CLINIC | Age: 59
End: 2019-11-20
Payer: MEDICAID

## 2019-11-20 VITALS
DIASTOLIC BLOOD PRESSURE: 81 MMHG | HEIGHT: 60 IN | WEIGHT: 170 LBS | BODY MASS INDEX: 33.38 KG/M2 | HEART RATE: 76 BPM | SYSTOLIC BLOOD PRESSURE: 137 MMHG

## 2019-11-20 PROCEDURE — 99203 OFFICE O/P NEW LOW 30 MIN: CPT | Mod: GC

## 2019-11-20 RX ORDER — PANTOPRAZOLE 40 MG/1
40 TABLET, DELAYED RELEASE ORAL DAILY
Qty: 1200 | Refills: 5 | Status: COMPLETED | COMMUNITY
Start: 2019-11-20

## 2019-11-20 NOTE — HISTORY OF PRESENT ILLNESS
[FreeTextEntry1] : came for initial visit [de-identified] : 59 years old female pt with no known past medical hx came for initial visit.\par she is complaining of on/off mild epigastric pain, denies any SOB or palpitation, denies any association with food intake, was recently tested positive for stool h pylori antigen completed full course of antibiotics almost 2 months back.\par she recently underwent laparoscopic cholecystectomy.\par has on.off diarrhoea after eating fatty meal, denies nausea, vomiting or constipation.\par denies any joint or muscle pain.\par she denies smoking or any illicit drug, consumes alcohol occasionally.

## 2019-11-20 NOTE — ASSESSMENT
[FreeTextEntry1] : 59 years old female pt with no known past medical hx came for initial visit.\par \par # dyspepsia/ epigastric pain\par    likely gastritis\par    was positive for H pylori\par    will test stool antigen again\par    has follow up appointment with GI for endoscopy\par   will start PPI AFTER STOOL TEST, EDUCATED ON HOLDING 2 WEEKS BEFORE ENDOSCOPY\par \par # health care maintenance\par   will check cbc, bmp, lipid profile, hba1c, vitamin d 25\par   colonoscopy screening already following with GI\par   mammogram will refer\par   pap smear will refer to gyn\par \par # follow up in 3 months and prn.

## 2019-11-25 DIAGNOSIS — R10.13 EPIGASTRIC PAIN: ICD-10-CM

## 2019-11-26 NOTE — ASSESSMENT
[FreeTextEntry1] : Pricilla is a 58 year old lady who was admitted for acute cholecystitis. She under went cholecystectomy and was discharged home. She was doing better but then started to have abdominal pain. \par She notes the pain is worse on eating. \par She has normal color stool and urine. \par PPI has improved her symptoms,. \par We had send blood works - which are normal \par Her HPylori test is positive. \par We had started her on Hpylori therapy .\par Her pain has improved. \par She was ordered a H.Pylroi Breath test. \par 11/6 : She still has occasional pain on eating fatty spicy food. \par \par Exam : Epigastric pain\par \par Likely gastritis\par will send her for urea breath test\par \par The Post operative care was explained to the patient. She was counselled on diet , exercise and wound care.\par We discussed the pathology and surgery with her.\par The Procedure was explained to the patient. The Risk , benefit and alternatives were discussed. We discussed recovery and possible complications.\par \par We will repeat the blood work and us.\par We will refer her to GI \par \par We discussed the importance of close follow up. \par We informed that she needs to follow up in  1 month. \par We also informed that she can call us if anything changes or has any questions.\par

## 2019-11-26 NOTE — PHYSICAL EXAM
[Alert] : alert [Calm] : calm [JVD] : no jugular venous distention  [Purpura] : no purpura  [de-identified] : Normal [de-identified] : Normal [de-identified] : epigastric pain

## 2019-11-26 NOTE — HISTORY OF PRESENT ILLNESS
[de-identified] : Pricilla is a 58 year old lady who was admitted for acute cholecystitis. She under went cholecystectomy and was discharged home. She was doing better but then started to have abdominal pain. \par She notes the pain is worse on eating. \par She has normal color stool and urine. \par PPI has improved her symptoms,. \par We had send blood works - which are normal \par Her HPylori test is positive. \par We had started her on Hpylori therapy .\par Her pain has improved. \par She was ordered a H.Pylroi Breath test. \par 11/6 : She still has occasional pain on eating fatty spicy food.

## 2019-12-02 ENCOUNTER — APPOINTMENT (OUTPATIENT)
Dept: INTERNAL MEDICINE | Facility: CLINIC | Age: 59
End: 2019-12-02

## 2019-12-02 ENCOUNTER — APPOINTMENT (OUTPATIENT)
Dept: INTERNAL MEDICINE | Facility: CLINIC | Age: 59
End: 2019-12-02
Payer: MEDICAID

## 2019-12-02 ENCOUNTER — OUTPATIENT (OUTPATIENT)
Dept: OUTPATIENT SERVICES | Facility: HOSPITAL | Age: 59
LOS: 1 days | Discharge: HOME | End: 2019-12-02

## 2019-12-02 VITALS
HEIGHT: 60 IN | SYSTOLIC BLOOD PRESSURE: 133 MMHG | BODY MASS INDEX: 32.2 KG/M2 | WEIGHT: 164 LBS | HEART RATE: 83 BPM | DIASTOLIC BLOOD PRESSURE: 83 MMHG

## 2019-12-02 VITALS — TEMPERATURE: 98.5 F

## 2019-12-02 PROCEDURE — 99213 OFFICE O/P EST LOW 20 MIN: CPT | Mod: GC

## 2019-12-02 NOTE — HISTORY OF PRESENT ILLNESS
[de-identified] : 59 years old female pt with PMH of lap cholecystectomy and H. pylori positive gastritis came for chief complaint of cold. Has been complaining of a cold and throat burning, with nasal congestion for 1 weeks duration. When coughing feels like there are "spikes" of pain in the back. and it productive of yellow phlegm. Has not improved throughout the week. Denies sick contacts, lives at home with her daughter, daughters , and 2 grandchildren. Denies muscle aches. Denies receiving the flu shot this year. Mentions having bronchitis for 1 month after receiving it. Admits to chest pain while coughing, LLQ abdominal pain, has some SOB at night because of her cold. Denies, vomiting, diarrhea, constipation. Admits to having fevers and night sweats and chills. \par \par Still has not submitted stool for H pylori testing. Has appointment on January 30th. Reports finishing her PPI course back in October. \par \par She denies smoking (quit 15 years ago), denies alcohol use.  [FreeTextEntry1] : Cold for 1 week

## 2019-12-02 NOTE — H&P ADULT - NSICDXNOPASTMEDICALHX_GEN_ALL_CORE
<-- Click to add NO pertinent Past Medical History Protonix ordered for GI prophylaxis  Continue senna & miralax  Continue Heparin for DVT for prophylaxis Continue Protonix for GI prophylaxis  Continue senna & miralax  Continue Heparin for DVT for prophylaxis

## 2019-12-02 NOTE — PHYSICAL EXAM
[Well Nourished] : well nourished [No Acute Distress] : no acute distress [No JVD] : no jugular venous distention [Normal Sclera/Conjunctiva] : normal sclera/conjunctiva [Normal Rate] : normal rate  [No Respiratory Distress] : no respiratory distress  [Soft] : abdomen soft [Non-distended] : non-distended [Normal Anterior Cervical Nodes] : no anterior cervical lymphadenopathy [Normal Posterior Cervical Nodes] : no posterior cervical lymphadenopathy [Normal] : no CVA or spinal tenderness [de-identified] : TTP in RLQ and LLQ [de-identified] : Oropharyngeal erythema

## 2019-12-02 NOTE — REVIEW OF SYSTEMS
[Night Sweats] : night sweats [Chills] : chills [Fever] : fever [Cough] : cough [Sore Throat] : sore throat [Diarrhea] : diarrhea [Abdominal Pain] : abdominal pain [Negative] : Neurological

## 2019-12-02 NOTE — ASSESSMENT
[FreeTextEntry1] : 59 years old female pt with PMH of lap cholecystectomy and H. pylori positive gastritis came for chief complaint of cold\par \par #Viral uri\par - Complains of cough, sore throat, fever, chills, night sweats for 1 week. no myalgia\par - No fever\par - Symptomatic management, guaifenacin and sudafed\par  \par # History of dyspepsia/ epigastric pain\par    likely was gastritis\par    was positive for H pylori\par    will test stool antigen again - has appointment on Jan 30th.\par    has follow up appointment with GI for endoscopy\par    Last PPI was in october.\par   will start PPI AFTER STOOL TEST, EDUCATED ON HOLDING 2 WEEKS BEFORE ENDOSCOPY\par \par # health care maintenance\par   will check cbc, bmp, lipid profile, hba1c, vitamin d 25\par   colonoscopy screening already following with GI\par   mammogram Jan 30th\par   pap smear will refer to gyn\par \par # follow up in 3 months and prn.

## 2019-12-09 ENCOUNTER — APPOINTMENT (OUTPATIENT)
Dept: GASTROENTEROLOGY | Facility: CLINIC | Age: 59
End: 2019-12-09
Payer: MEDICAID

## 2019-12-09 VITALS
HEART RATE: 80 BPM | HEIGHT: 59.84 IN | DIASTOLIC BLOOD PRESSURE: 80 MMHG | WEIGHT: 168 LBS | SYSTOLIC BLOOD PRESSURE: 144 MMHG | BODY MASS INDEX: 32.98 KG/M2

## 2019-12-09 DIAGNOSIS — Z80.0 FAMILY HISTORY OF MALIGNANT NEOPLASM OF DIGESTIVE ORGANS: ICD-10-CM

## 2019-12-09 DIAGNOSIS — Z00.00 ENCOUNTER FOR GENERAL ADULT MEDICAL EXAMINATION W/OUT ABNORMAL FINDINGS: ICD-10-CM

## 2019-12-09 DIAGNOSIS — K80.20 CALCULUS OF GALLBLADDER W/OUT CHOLECYSTITIS W/OUT OBSTRUCTION: ICD-10-CM

## 2019-12-09 DIAGNOSIS — Z78.9 OTHER SPECIFIED HEALTH STATUS: ICD-10-CM

## 2019-12-09 DIAGNOSIS — Z80.49 FAMILY HISTORY OF MALIGNANT NEOPLASM OF OTHER GENITAL ORGANS: ICD-10-CM

## 2019-12-09 PROCEDURE — 99203 OFFICE O/P NEW LOW 30 MIN: CPT

## 2019-12-09 RX ORDER — GUAIFENESIN 100 MG/5ML
300 SOLUTION ORAL EVERY 4 HOURS
Qty: 500 | Refills: 0 | Status: DISCONTINUED | COMMUNITY
Start: 2019-12-02 | End: 2019-12-09

## 2019-12-09 RX ORDER — PSEUDOEPHEDRINE HYDROCHLORIDE 120 MG/1
120 TABLET, FILM COATED, EXTENDED RELEASE ORAL
Qty: 10 | Refills: 0 | Status: DISCONTINUED | COMMUNITY
Start: 2019-12-02 | End: 2019-12-09

## 2019-12-09 NOTE — PHYSICAL EXAM
[General Appearance - Alert] : alert [General Appearance - Well Nourished] : well nourished [Sclera] : the sclera and conjunctiva were normal [Extraocular Movements] : extraocular movements were intact [Outer Ear] : the ears and nose were normal in appearance [Neck Appearance] : the appearance of the neck was normal [] : no respiratory distress [Exaggerated Use Of Accessory Muscles For Inspiration] : no accessory muscle use [Abdomen Soft] : soft [Abnormal Walk] : normal gait [Involuntary Movements] : no involuntary movements were seen [Skin Color & Pigmentation] : normal skin color and pigmentation [No Focal Deficits] : no focal deficits [Affect] : the affect was normal

## 2019-12-09 NOTE — HISTORY OF PRESENT ILLNESS
[de-identified] : Pt is a 60 y/o F who presents for epigastric discomfort, dysphagia, routine colonoscopy. \par \par Pt reports she has had abd discomfort/acid reflux for many years, had an EGD done 4 years ago in [place], was told she had gastritis. Found to have H.pylori recently, finished treatment 2 months ago. Had a breath test after treatment, does not know the results. Since treatment, she reports her abd pain is improved, but still feels acid reflux. Also reports occasional dysphagia to solid foods for 8 years. Denies any dysphagia to liquids. Denies any weight loss. Has occasional rectal bleeding, which she was told is from hemorrhoids, only occurs when she's constipated. Maternal GM with possible hx of stomach cancer. \par \par Had colonoscopy 4 years ago in [place], does not think she had polyps. Does not have report.

## 2019-12-09 NOTE — ASSESSMENT
[FreeTextEntry1] : Dysphagia - will plan for EGD, possible dilation (may have peptic stricture)\par - hold on PPI for now until EGD\par \par Acid reflux - will likely need PPI after EGD\par \par Colon cancer screening - do not have results from last CF; will repeat here\par - golytely/dulcolax prep

## 2019-12-11 DIAGNOSIS — J06.9 ACUTE UPPER RESPIRATORY INFECTION, UNSPECIFIED: ICD-10-CM

## 2019-12-11 DIAGNOSIS — R10.13 EPIGASTRIC PAIN: ICD-10-CM

## 2020-01-07 ENCOUNTER — OUTPATIENT (OUTPATIENT)
Dept: OUTPATIENT SERVICES | Facility: HOSPITAL | Age: 60
LOS: 1 days | Discharge: HOME | End: 2020-01-07
Payer: MEDICAID

## 2020-01-07 ENCOUNTER — RESULT REVIEW (OUTPATIENT)
Age: 60
End: 2020-01-07

## 2020-01-07 ENCOUNTER — TRANSCRIPTION ENCOUNTER (OUTPATIENT)
Age: 60
End: 2020-01-07

## 2020-01-07 VITALS
DIASTOLIC BLOOD PRESSURE: 65 MMHG | HEART RATE: 75 BPM | TEMPERATURE: 98 F | RESPIRATION RATE: 18 BRPM | HEIGHT: 59.06 IN | WEIGHT: 186.07 LBS | SYSTOLIC BLOOD PRESSURE: 119 MMHG

## 2020-01-07 VITALS
OXYGEN SATURATION: 99 % | RESPIRATION RATE: 18 BRPM | SYSTOLIC BLOOD PRESSURE: 119 MMHG | DIASTOLIC BLOOD PRESSURE: 63 MMHG | HEART RATE: 72 BPM

## 2020-01-07 DIAGNOSIS — Z98.890 OTHER SPECIFIED POSTPROCEDURAL STATES: Chronic | ICD-10-CM

## 2020-01-07 DIAGNOSIS — Z90.49 ACQUIRED ABSENCE OF OTHER SPECIFIED PARTS OF DIGESTIVE TRACT: Chronic | ICD-10-CM

## 2020-01-07 PROCEDURE — 43239 EGD BIOPSY SINGLE/MULTIPLE: CPT

## 2020-01-07 PROCEDURE — 88312 SPECIAL STAINS GROUP 1: CPT | Mod: 26

## 2020-01-07 PROCEDURE — 88305 TISSUE EXAM BY PATHOLOGIST: CPT | Mod: 26

## 2020-01-07 PROCEDURE — 45380 COLONOSCOPY AND BIOPSY: CPT | Mod: XS

## 2020-01-07 NOTE — H&P PST ADULT - HISTORY OF PRESENT ILLNESS
Pt reports she has had abd discomfort/acid reflux for many years, had an EGD done 4 years ago in [place], was told she had gastritis. Found to have H.pylori recently, finished treatment 2 months ago. Had a breath test after treatment, does not know the results. Since treatment, she reports her abd pain is improved, but still feels acid reflux. Also reports occasional dysphagia to solid foods for 8 years. Denies any dysphagia to liquids. Denies any weight loss. Has occasional rectal bleeding, which she was told is from hemorrhoids, only occurs when she's constipated. Maternal GM with possible hx of stomach cancer.     Had colonoscopy 4 years ago in [place], does not think she had polyps. Does not have report.

## 2020-01-07 NOTE — ASU DISCHARGE PLAN (ADULT/PEDIATRIC) - FOLLOW UP APPOINTMENTS
911 or go to the nearest Emergency Room [facility]:  Endoscopy/Ambulatory Surgery [place]...

## 2020-01-09 LAB
SURGICAL PATHOLOGY STUDY: SIGNIFICANT CHANGE UP
SURGICAL PATHOLOGY STUDY: SIGNIFICANT CHANGE UP

## 2020-01-10 DIAGNOSIS — B96.81 HELICOBACTER PYLORI [H. PYLORI] AS THE CAUSE OF DISEASES CLASSIFIED ELSEWHERE: ICD-10-CM

## 2020-01-10 DIAGNOSIS — K20.9 ESOPHAGITIS, UNSPECIFIED: ICD-10-CM

## 2020-01-10 DIAGNOSIS — K31.9 DISEASE OF STOMACH AND DUODENUM, UNSPECIFIED: ICD-10-CM

## 2020-01-10 DIAGNOSIS — K29.50 UNSPECIFIED CHRONIC GASTRITIS WITHOUT BLEEDING: ICD-10-CM

## 2020-01-10 DIAGNOSIS — K29.80 DUODENITIS WITHOUT BLEEDING: ICD-10-CM

## 2020-01-10 DIAGNOSIS — R13.10 DYSPHAGIA, UNSPECIFIED: ICD-10-CM

## 2020-01-10 DIAGNOSIS — K64.4 RESIDUAL HEMORRHOIDAL SKIN TAGS: ICD-10-CM

## 2020-01-10 DIAGNOSIS — K64.8 OTHER HEMORRHOIDS: ICD-10-CM

## 2020-01-10 DIAGNOSIS — K52.9 NONINFECTIVE GASTROENTERITIS AND COLITIS, UNSPECIFIED: ICD-10-CM

## 2020-01-10 DIAGNOSIS — Z88.0 ALLERGY STATUS TO PENICILLIN: ICD-10-CM

## 2020-01-10 DIAGNOSIS — D12.2 BENIGN NEOPLASM OF ASCENDING COLON: ICD-10-CM

## 2020-01-10 PROBLEM — Z78.9 OTHER SPECIFIED HEALTH STATUS: Chronic | Status: ACTIVE | Noted: 2019-08-05

## 2020-01-17 ENCOUNTER — FORM ENCOUNTER (OUTPATIENT)
Age: 60
End: 2020-01-17

## 2020-01-18 ENCOUNTER — OUTPATIENT (OUTPATIENT)
Dept: OUTPATIENT SERVICES | Facility: HOSPITAL | Age: 60
LOS: 1 days | Discharge: HOME | End: 2020-01-18
Payer: MEDICAID

## 2020-01-18 DIAGNOSIS — Z90.49 ACQUIRED ABSENCE OF OTHER SPECIFIED PARTS OF DIGESTIVE TRACT: Chronic | ICD-10-CM

## 2020-01-18 DIAGNOSIS — Z98.890 OTHER SPECIFIED POSTPROCEDURAL STATES: Chronic | ICD-10-CM

## 2020-01-18 DIAGNOSIS — Z12.31 ENCOUNTER FOR SCREENING MAMMOGRAM FOR MALIGNANT NEOPLASM OF BREAST: ICD-10-CM

## 2020-01-18 PROCEDURE — 77067 SCR MAMMO BI INCL CAD: CPT | Mod: 26

## 2020-01-18 PROCEDURE — 77063 BREAST TOMOSYNTHESIS BI: CPT | Mod: 26

## 2020-01-24 ENCOUNTER — APPOINTMENT (OUTPATIENT)
Dept: GASTROENTEROLOGY | Facility: CLINIC | Age: 60
End: 2020-01-24
Payer: MEDICAID

## 2020-01-24 VITALS
HEART RATE: 80 BPM | SYSTOLIC BLOOD PRESSURE: 144 MMHG | WEIGHT: 166 LBS | BODY MASS INDEX: 33.47 KG/M2 | HEIGHT: 59 IN | DIASTOLIC BLOOD PRESSURE: 80 MMHG

## 2020-01-24 DIAGNOSIS — R10.9 UNSPECIFIED ABDOMINAL PAIN: ICD-10-CM

## 2020-01-24 DIAGNOSIS — R13.10 DYSPHAGIA, UNSPECIFIED: ICD-10-CM

## 2020-01-24 PROCEDURE — 99214 OFFICE O/P EST MOD 30 MIN: CPT

## 2020-01-24 RX ORDER — METRONIDAZOLE 500 MG/1
500 TABLET ORAL 3 TIMES DAILY
Qty: 42 | Refills: 0 | Status: DISCONTINUED | COMMUNITY
Start: 2019-09-04 | End: 2020-01-24

## 2020-01-24 RX ORDER — OMEPRAZOLE 40 MG/1
40 CAPSULE, DELAYED RELEASE ORAL TWICE DAILY
Qty: 30 | Refills: 2 | Status: DISCONTINUED | COMMUNITY
Start: 2019-08-21 | End: 2020-01-24

## 2020-01-24 RX ORDER — CLARITHROMYCIN 500 MG/1
500 TABLET, FILM COATED ORAL
Qty: 28 | Refills: 0 | Status: DISCONTINUED | COMMUNITY
Start: 2019-09-04 | End: 2020-01-24

## 2020-01-24 RX ORDER — POLYETHYLENE GLYCOL 3350, SODIUM SULFATE ANHYDROUS, SODIUM BICARBONATE, SODIUM CHLORIDE, POTASSIUM CHLORIDE 227.1; 21.5; 6.36; 5.53; .754 G/L; G/L; G/L; G/L; G/L
227.1 POWDER, FOR SOLUTION ORAL
Qty: 1 | Refills: 0 | Status: DISCONTINUED | COMMUNITY
Start: 2019-12-09 | End: 2020-01-24

## 2020-01-24 NOTE — ASSESSMENT
[FreeTextEntry1] : Abd pain - most likely 2/2 active H.pylori infection and gastritis\par - will treat with quadruple therapy x 2 weeks\par - will confirm eradication with breath test 1 month after treatment is completed\par \par Dysphagia - resolved, mostly just has acid reflux at night\par - path negative for EOE\par - no further w/u for now, can start PPI after confirmed H.pylori eradication\par \par IM on pathology - pt is in higher risk category given ethnicity and positive H.pylori\par - repeat EGD in 1 year\par \par Colon cancer screening - one TA 1/2020\par - repeat in 5 years, 2025

## 2020-01-24 NOTE — HISTORY OF PRESENT ILLNESS
[_________] : Performed [unfilled] [de-identified] : Pt is a 58 y/o F who presents for follow up. \par \par At last appt pt had reported epigastric discomfort, dysphagia. \par \par Pt had abd discomfort/acid reflux for many years, had an EGD done 4 years ago in [place], was told she had gastritis. Found to have H.pylori, finished treatment in October, never had confirmed eradication. Since treatment, she reports her abd pain is improved, but still feels acid reflux. Also reports occasional dysphagia to solid foods for 8 years. Denies any dysphagia to liquids. Denies any weight loss. Has occasional rectal bleeding, which she was told is from hemorrhoids, only occurs when she's constipated. Maternal GM with possible hx of stomach cancer. \par \par Had colonoscopy 4 years ago in [place], does not think she had polyps. Did not have report. \par \par EGD and CF done 1/7. EGD showed normal esophagus, gastritis, path positive for gastritis, +H.pylori, +IM without dysplasia in the incisura. Biopsies of the esophagus were negative for EOE. Colonoscopy good prep, showed one TA. \par \par Pt reports her abd pain is mostly resolved. No current dysphagia, but does feel acid reflux when she lies down at night.

## 2020-01-27 ENCOUNTER — APPOINTMENT (OUTPATIENT)
Dept: INTERNAL MEDICINE | Facility: CLINIC | Age: 60
End: 2020-01-27
Payer: MEDICAID

## 2020-01-27 ENCOUNTER — OUTPATIENT (OUTPATIENT)
Dept: OUTPATIENT SERVICES | Facility: HOSPITAL | Age: 60
LOS: 1 days | Discharge: HOME | End: 2020-01-27

## 2020-01-27 VITALS
HEIGHT: 59 IN | SYSTOLIC BLOOD PRESSURE: 129 MMHG | DIASTOLIC BLOOD PRESSURE: 77 MMHG | WEIGHT: 169 LBS | HEART RATE: 88 BPM | BODY MASS INDEX: 34.07 KG/M2 | TEMPERATURE: 98.5 F

## 2020-01-27 DIAGNOSIS — Z98.890 OTHER SPECIFIED POSTPROCEDURAL STATES: Chronic | ICD-10-CM

## 2020-01-27 DIAGNOSIS — Z12.11 ENCOUNTER FOR SCREENING FOR MALIGNANT NEOPLASM OF COLON: ICD-10-CM

## 2020-01-27 DIAGNOSIS — R12 HEARTBURN: ICD-10-CM

## 2020-01-27 DIAGNOSIS — R05 COUGH: ICD-10-CM

## 2020-01-27 DIAGNOSIS — Z90.49 ACQUIRED ABSENCE OF OTHER SPECIFIED PARTS OF DIGESTIVE TRACT: Chronic | ICD-10-CM

## 2020-01-27 LAB
25(OH)D3 SERPL-MCNC: 26 NG/ML
ANION GAP SERPL CALC-SCNC: 15 MMOL/L
BASOPHILS # BLD AUTO: 0.03 K/UL
BASOPHILS NFR BLD AUTO: 0.5 %
BUN SERPL-MCNC: 13 MG/DL
CALCIUM SERPL-MCNC: 8.8 MG/DL
CHLORIDE SERPL-SCNC: 102 MMOL/L
CHOLEST SERPL-MCNC: 150 MG/DL
CHOLEST/HDLC SERPL: 2.5 RATIO
CO2 SERPL-SCNC: 24 MMOL/L
CREAT SERPL-MCNC: 0.6 MG/DL
EOSINOPHIL # BLD AUTO: 0.07 K/UL
EOSINOPHIL NFR BLD AUTO: 1.1 %
ESTIMATED AVERAGE GLUCOSE: 126 MG/DL
GLUCOSE SERPL-MCNC: 98 MG/DL
HBA1C MFR BLD HPLC: 6 %
HCT VFR BLD CALC: 40 %
HDLC SERPL-MCNC: 60 MG/DL
HGB BLD-MCNC: 12.8 G/DL
IMM GRANULOCYTES NFR BLD AUTO: 0.2 %
LDLC SERPL CALC-MCNC: 97 MG/DL
LYMPHOCYTES # BLD AUTO: 1.57 K/UL
LYMPHOCYTES NFR BLD AUTO: 24.1 %
MAN DIFF?: NORMAL
MCHC RBC-ENTMCNC: 26.8 PG
MCHC RBC-ENTMCNC: 32 G/DL
MCV RBC AUTO: 83.9 FL
MONOCYTES # BLD AUTO: 0.56 K/UL
MONOCYTES NFR BLD AUTO: 8.6 %
NEUTROPHILS # BLD AUTO: 4.28 K/UL
NEUTROPHILS NFR BLD AUTO: 65.5 %
PLATELET # BLD AUTO: 245 K/UL
POTASSIUM SERPL-SCNC: 4.1 MMOL/L
RBC # BLD: 4.77 M/UL
RBC # FLD: 12.5 %
SODIUM SERPL-SCNC: 141 MMOL/L
TRIGL SERPL-MCNC: 46 MG/DL
WBC # FLD AUTO: 6.52 K/UL

## 2020-01-27 PROCEDURE — 99213 OFFICE O/P EST LOW 20 MIN: CPT | Mod: GC

## 2020-01-27 NOTE — ASSESSMENT
[FreeTextEntry1] : 58 y/o Fm w/ PMhx of s/p ccy, H. Pylori +ve gastritis currently on Quadruple therapy (started on 1/27) and pre-dm presents for follow up. Pt is recovering from a viral URI. No other acute complaints.She is compliant with treatment\par \par #Dyspepsia d/t H. Pylori\par - c/w abx treatment\par - will confirm eradication after treatment\par - will hold PPI at least 2 weeks prior to H. Pylori eradication testing\par - Pt had EGD (1/7); EGD showed normal esophagus, gastritis, path positive for gastritis, +H.pylori, +IM without dysplasia in the incisura. Biopsies of the esophagus were negative for EOE.\par \par #Pre-DM\par - A1c 6\par - diet and exercise counselling provided\par \par #HCM\par - Mammogram - breast asymmetry will give referral for repeat Mammo with sono\par - Colonoscopy (1/7/2020): will repeat in 5 years\par - will repeat blood work next week\par - f/u in 6 months and prn

## 2020-01-27 NOTE — HISTORY OF PRESENT ILLNESS
[FreeTextEntry1] : follow up [de-identified] : 58 y/o Fm w/ PMhx of s/p ccy, H. Pylori +ve gastritis currently on Quadruple therapy (started on 1/27) and pre-dm presents for follow up. Pt is recovering from a viral URI. No other acute complaints.She is compliant with treatment

## 2020-01-31 DIAGNOSIS — R12 HEARTBURN: ICD-10-CM

## 2020-01-31 DIAGNOSIS — A04.8 OTHER SPECIFIED BACTERIAL INTESTINAL INFECTIONS: ICD-10-CM

## 2020-01-31 DIAGNOSIS — J06.9 ACUTE UPPER RESPIRATORY INFECTION, UNSPECIFIED: ICD-10-CM

## 2020-01-31 DIAGNOSIS — Z12.11 ENCOUNTER FOR SCREENING FOR MALIGNANT NEOPLASM OF COLON: ICD-10-CM

## 2020-01-31 DIAGNOSIS — R05 COUGH: ICD-10-CM

## 2020-02-02 ENCOUNTER — FORM ENCOUNTER (OUTPATIENT)
Age: 60
End: 2020-02-02

## 2020-02-03 ENCOUNTER — OUTPATIENT (OUTPATIENT)
Dept: OUTPATIENT SERVICES | Facility: HOSPITAL | Age: 60
LOS: 1 days | Discharge: HOME | End: 2020-02-03
Payer: MEDICAID

## 2020-02-03 DIAGNOSIS — Z90.49 ACQUIRED ABSENCE OF OTHER SPECIFIED PARTS OF DIGESTIVE TRACT: Chronic | ICD-10-CM

## 2020-02-03 DIAGNOSIS — Z98.890 OTHER SPECIFIED POSTPROCEDURAL STATES: Chronic | ICD-10-CM

## 2020-02-03 DIAGNOSIS — R92.8 OTHER ABNORMAL AND INCONCLUSIVE FINDINGS ON DIAGNOSTIC IMAGING OF BREAST: ICD-10-CM

## 2020-02-03 PROCEDURE — 77062 BREAST TOMOSYNTHESIS BI: CPT | Mod: 26

## 2020-02-03 PROCEDURE — 76642 ULTRASOUND BREAST LIMITED: CPT | Mod: 26,50

## 2020-02-03 PROCEDURE — 77066 DX MAMMO INCL CAD BI: CPT | Mod: 26

## 2020-02-25 LAB — H PYLORI AG STL QL: POSITIVE

## 2020-03-06 ENCOUNTER — OUTPATIENT (OUTPATIENT)
Dept: OUTPATIENT SERVICES | Facility: HOSPITAL | Age: 60
LOS: 1 days | Discharge: HOME | End: 2020-03-06

## 2020-03-06 ENCOUNTER — APPOINTMENT (OUTPATIENT)
Dept: OBGYN | Facility: CLINIC | Age: 60
End: 2020-03-06
Payer: MEDICAID

## 2020-03-06 VITALS
DIASTOLIC BLOOD PRESSURE: 72 MMHG | BODY MASS INDEX: 32.46 KG/M2 | WEIGHT: 161 LBS | HEIGHT: 59 IN | SYSTOLIC BLOOD PRESSURE: 110 MMHG

## 2020-03-06 DIAGNOSIS — Z98.890 OTHER SPECIFIED POSTPROCEDURAL STATES: Chronic | ICD-10-CM

## 2020-03-06 DIAGNOSIS — Z01.419 ENCOUNTER FOR GYNECOLOGICAL EXAMINATION (GENERAL) (ROUTINE) WITHOUT ABNORMAL FINDINGS: ICD-10-CM

## 2020-03-06 DIAGNOSIS — N76.0 ACUTE VAGINITIS: ICD-10-CM

## 2020-03-06 DIAGNOSIS — Z90.49 ACQUIRED ABSENCE OF OTHER SPECIFIED PARTS OF DIGESTIVE TRACT: Chronic | ICD-10-CM

## 2020-03-06 DIAGNOSIS — Z87.891 PERSONAL HISTORY OF NICOTINE DEPENDENCE: ICD-10-CM

## 2020-03-06 DIAGNOSIS — Z78.9 OTHER SPECIFIED HEALTH STATUS: ICD-10-CM

## 2020-03-06 DIAGNOSIS — R10.2 PELVIC AND PERINEAL PAIN: ICD-10-CM

## 2020-03-06 PROCEDURE — 99386 PREV VISIT NEW AGE 40-64: CPT

## 2020-03-10 DIAGNOSIS — N76.0 ACUTE VAGINITIS: ICD-10-CM

## 2020-03-10 DIAGNOSIS — R10.2 PELVIC AND PERINEAL PAIN: ICD-10-CM

## 2020-03-10 LAB
A VAGINAE DNA VAG QL NAA+PROBE: NORMAL
BVAB2 DNA VAG QL NAA+PROBE: NORMAL
C KRUSEI DNA VAG QL NAA+PROBE: NEGATIVE
C TRACH RRNA SPEC QL NAA+PROBE: NEGATIVE
HPV HIGH+LOW RISK DNA PNL CVX: NOT DETECTED
MEGA1 DNA VAG QL NAA+PROBE: NORMAL
N GONORRHOEA RRNA SPEC QL NAA+PROBE: NEGATIVE
T VAGINALIS RRNA SPEC QL NAA+PROBE: NEGATIVE

## 2020-03-18 ENCOUNTER — APPOINTMENT (OUTPATIENT)
Age: 60
End: 2020-03-18

## 2020-10-01 NOTE — PHYSICAL EXAM
[Well Nourished] : well nourished [Normal Sclera/Conjunctiva] : normal sclera/conjunctiva [No JVD] : no jugular venous distention [Normal Outer Ear/Nose] : the outer ears and nose were normal in appearance [No Respiratory Distress] : no respiratory distress  [Normal Rate] : normal rate  [No Carotid Bruits] : no carotid bruits [Normal] : normal gait, coordination grossly intact, no focal deficits and deep tendon reflexes were 2+ and symmetric Yes

## 2020-12-23 PROBLEM — N76.0 ACUTE VAGINITIS: Status: RESOLVED | Noted: 2020-03-06 | Resolved: 2020-12-23

## 2021-03-19 ENCOUNTER — APPOINTMENT (OUTPATIENT)
Dept: OBGYN | Facility: CLINIC | Age: 61
End: 2021-03-19

## 2021-04-16 NOTE — ED PROVIDER NOTE - IMAGING STUDIES QUESTION 2 - PERFORMED INDEPENDENT VISUALIZATION
Yes normal... Well appearing, awake, alert, oriented to person, place, time/situation and in no apparent distress.

## 2021-11-26 ENCOUNTER — OUTPATIENT (OUTPATIENT)
Dept: OUTPATIENT SERVICES | Facility: HOSPITAL | Age: 61
LOS: 1 days | Discharge: HOME | End: 2021-11-26

## 2021-11-26 ENCOUNTER — APPOINTMENT (OUTPATIENT)
Dept: INTERNAL MEDICINE | Facility: CLINIC | Age: 61
End: 2021-11-26
Payer: MEDICAID

## 2021-11-26 ENCOUNTER — NON-APPOINTMENT (OUTPATIENT)
Age: 61
End: 2021-11-26

## 2021-11-26 VITALS
HEIGHT: 59 IN | SYSTOLIC BLOOD PRESSURE: 120 MMHG | TEMPERATURE: 98.3 F | BODY MASS INDEX: 32.42 KG/M2 | DIASTOLIC BLOOD PRESSURE: 67 MMHG | HEART RATE: 77 BPM | OXYGEN SATURATION: 96 % | WEIGHT: 160.8 LBS

## 2021-11-26 DIAGNOSIS — Z98.890 OTHER SPECIFIED POSTPROCEDURAL STATES: Chronic | ICD-10-CM

## 2021-11-26 DIAGNOSIS — M25.531 PAIN IN RIGHT WRIST: ICD-10-CM

## 2021-11-26 DIAGNOSIS — R73.03 PREDIABETES: ICD-10-CM

## 2021-11-26 DIAGNOSIS — Z90.49 ACQUIRED ABSENCE OF OTHER SPECIFIED PARTS OF DIGESTIVE TRACT: Chronic | ICD-10-CM

## 2021-11-26 DIAGNOSIS — Z00.00 ENCOUNTER FOR GENERAL ADULT MEDICAL EXAMINATION WITHOUT ABNORMAL FINDINGS: ICD-10-CM

## 2021-11-26 PROCEDURE — 99214 OFFICE O/P EST MOD 30 MIN: CPT | Mod: GC

## 2021-11-26 RX ORDER — FLUCONAZOLE 150 MG/1
150 TABLET ORAL
Qty: 2 | Refills: 2 | Status: DISCONTINUED | COMMUNITY
Start: 2020-03-06 | End: 2021-11-26

## 2021-11-26 RX ORDER — PANTOPRAZOLE 40 MG/1
40 TABLET, DELAYED RELEASE ORAL TWICE DAILY
Qty: 28 | Refills: 0 | Status: DISCONTINUED | COMMUNITY
Start: 2020-01-24 | End: 2021-11-26

## 2021-11-26 RX ORDER — BISMUTH SUBSALICYLATE 262 MG/1
262 TABLET, CHEWABLE ORAL 4 TIMES DAILY
Qty: 112 | Refills: 0 | Status: DISCONTINUED | COMMUNITY
Start: 2020-01-24 | End: 2021-11-26

## 2021-11-26 RX ORDER — CLOTRIMAZOLE 10 MG/G
1 CREAM TOPICAL
Qty: 1 | Refills: 0 | Status: DISCONTINUED | COMMUNITY
Start: 2020-03-06 | End: 2021-11-26

## 2021-11-26 RX ORDER — METRONIDAZOLE 500 MG/1
500 TABLET ORAL 3 TIMES DAILY
Qty: 42 | Refills: 0 | Status: DISCONTINUED | COMMUNITY
Start: 2020-01-24 | End: 2021-11-26

## 2021-11-26 RX ORDER — TETRACYCLINE HYDROCHLORIDE 500 MG/1
500 CAPSULE ORAL
Qty: 56 | Refills: 0 | Status: DISCONTINUED | COMMUNITY
Start: 2020-01-24 | End: 2021-11-26

## 2021-11-26 NOTE — REVIEW OF SYSTEMS
[Palpitations] : palpitations [Fever] : no fever [Chills] : no chills [Recent Change In Weight] : ~T no recent weight change [Vision Problems] : no vision problems [Chest Pain] : no chest pain [Orthopnea] : no orthopnea [Paroxysmal Nocturnal Dyspnea] : no paroxysmal nocturnal dyspnea [Shortness Of Breath] : no shortness of breath [Cough] : no cough [Abdominal Pain] : no abdominal pain [Nausea] : no nausea [Vomiting] : no vomiting [Heartburn] : no heartburn [Dysuria] : no dysuria [Vaginal Discharge] : no vaginal discharge [Joint Pain] : no joint pain [Joint Stiffness] : no joint stiffness [Muscle Pain] : no muscle pain [FreeTextEntry5] : Slow/heavy heartbeat with no other symptoms [de-identified] : peripheral numbness on bilateral hands and feet

## 2021-11-26 NOTE — HISTORY OF PRESENT ILLNESS
[FreeTextEntry1] : follow up and mammogram referral [de-identified] : 62 y/o Fm w/ PMhx of s/p ccy, H. Pylori +ve gastritis s/p Quadruple therapy (started on 1/27) and pre-dm presents for follow up and mammogram referral. Pt complained of peripheral numbness (but no pain) on her bilateral wrists and feet; negative straight leg test. She also complained of vague "palpitations" that occur un-provoked with no elevated heart rate or predisposing factors. No chest pain or shortness of breath. Her last labs were from early 2020, will order repeat labs to follow-up on the pre-DM and get the remaining routine labs.

## 2021-11-26 NOTE — PHYSICAL EXAM
[No Acute Distress] : no acute distress [Well Nourished] : well nourished [Well Developed] : well developed [Well-Appearing] : well-appearing [Normal Sclera/Conjunctiva] : normal sclera/conjunctiva [Supple] : supple [Thyroid Normal, No Nodules] : the thyroid was normal and there were no nodules present [No Respiratory Distress] : no respiratory distress  [No Accessory Muscle Use] : no accessory muscle use [Clear to Auscultation] : lungs were clear to auscultation bilaterally [Normal Rate] : normal rate  [Regular Rhythm] : with a regular rhythm [Normal S1, S2] : normal S1 and S2 [No Murmur] : no murmur heard [No Extremity Clubbing/Cyanosis] : no extremity clubbing/cyanosis [Soft] : abdomen soft [Non Tender] : non-tender [Non-distended] : non-distended [No HSM] : no HSM [Grossly Normal Strength/Tone] : grossly normal strength/tone

## 2021-11-26 NOTE — ASSESSMENT
[FreeTextEntry1] : 60 y/o Fm w/ PMhx of s/p ccy, H. Pylori +ve gastritis s/p Quadruple therapy (started on 1/27) and pre-dm presents for follow up and mammogram referral. Pt had a fall 2 years ago on her right wrist and has not seen a doctor for the past 2 years. Complains of right wrist pain.\par \par #Pre-DM\par - A1c 6 - labs from early 2020\par - diet and exercise counselling provided\par - No changes in weight\par - Will order repeat labs today\par \par #Right wrist pain\par - s/p fall on outstretched hand 2 years ago\par - Has not followed up for 2 years\par - Ordered Rt wrist X-ray\par \par #HCM\par - Mammogram - breast asymmetry will give referral for repeat Mammo with sono\par - Colonoscopy (1/7/2020): will repeat in 4 years\par - Ordered repeat blood work\par - As per pt, received covid vaccine in April/May of 2021 but does not have vaccination card\par - f/u in 6 weeks and prn

## 2021-12-15 ENCOUNTER — LABORATORY RESULT (OUTPATIENT)
Age: 61
End: 2021-12-15

## 2021-12-15 ENCOUNTER — OUTPATIENT (OUTPATIENT)
Dept: OUTPATIENT SERVICES | Facility: HOSPITAL | Age: 61
LOS: 1 days | Discharge: HOME | End: 2021-12-15
Payer: MEDICAID

## 2021-12-15 ENCOUNTER — RESULT REVIEW (OUTPATIENT)
Age: 61
End: 2021-12-15

## 2021-12-15 DIAGNOSIS — Z98.890 OTHER SPECIFIED POSTPROCEDURAL STATES: Chronic | ICD-10-CM

## 2021-12-15 DIAGNOSIS — M25.531 PAIN IN RIGHT WRIST: ICD-10-CM

## 2021-12-15 DIAGNOSIS — Z90.49 ACQUIRED ABSENCE OF OTHER SPECIFIED PARTS OF DIGESTIVE TRACT: Chronic | ICD-10-CM

## 2021-12-15 PROCEDURE — 73110 X-RAY EXAM OF WRIST: CPT | Mod: 26,RT

## 2021-12-16 LAB
25(OH)D3 SERPL-MCNC: 25 NG/ML
ALBUMIN SERPL ELPH-MCNC: 4.3 G/DL
ALP BLD-CCNC: 93 U/L
ALT SERPL-CCNC: 16 U/L
ANION GAP SERPL CALC-SCNC: 11 MMOL/L
AST SERPL-CCNC: 21 U/L
BASOPHILS # BLD AUTO: 0.03 K/UL
BASOPHILS NFR BLD AUTO: 0.6 %
BILIRUB SERPL-MCNC: 0.4 MG/DL
BUN SERPL-MCNC: 14 MG/DL
CALCIUM SERPL-MCNC: 9.2 MG/DL
CHLORIDE SERPL-SCNC: 107 MMOL/L
CHOLEST SERPL-MCNC: 176 MG/DL
CO2 SERPL-SCNC: 28 MMOL/L
CREAT SERPL-MCNC: 0.7 MG/DL
EOSINOPHIL # BLD AUTO: 0.09 K/UL
EOSINOPHIL NFR BLD AUTO: 1.8 %
ESTIMATED AVERAGE GLUCOSE: 126 MG/DL
GLUCOSE SERPL-MCNC: 103 MG/DL
HBA1C MFR BLD HPLC: 6 %
HCT VFR BLD CALC: 43.4 %
HDLC SERPL-MCNC: 48 MG/DL
HGB BLD-MCNC: 14 G/DL
IMM GRANULOCYTES NFR BLD AUTO: 0.2 %
LDLC SERPL CALC-MCNC: 112 MG/DL
LYMPHOCYTES # BLD AUTO: 1.56 K/UL
LYMPHOCYTES NFR BLD AUTO: 31.5 %
MAN DIFF?: NORMAL
MCHC RBC-ENTMCNC: 27.3 PG
MCHC RBC-ENTMCNC: 32.3 G/DL
MCV RBC AUTO: 84.8 FL
MONOCYTES # BLD AUTO: 0.49 K/UL
MONOCYTES NFR BLD AUTO: 9.9 %
NEUTROPHILS # BLD AUTO: 2.77 K/UL
NEUTROPHILS NFR BLD AUTO: 56 %
NONHDLC SERPL-MCNC: 128 MG/DL
PLATELET # BLD AUTO: 214 K/UL
POTASSIUM SERPL-SCNC: 4.8 MMOL/L
PROT SERPL-MCNC: 7 G/DL
RBC # BLD: 5.12 M/UL
RBC # FLD: 12.8 %
SODIUM SERPL-SCNC: 146 MMOL/L
TRIGL SERPL-MCNC: 130 MG/DL
TSH SERPL-ACNC: 4.49 UIU/ML
WBC # FLD AUTO: 4.95 K/UL

## 2022-01-19 ENCOUNTER — APPOINTMENT (OUTPATIENT)
Dept: INTERNAL MEDICINE | Facility: CLINIC | Age: 62
End: 2022-01-19

## 2023-03-29 ENCOUNTER — OUTPATIENT (OUTPATIENT)
Dept: OUTPATIENT SERVICES | Facility: HOSPITAL | Age: 63
LOS: 1 days | End: 2023-03-29
Payer: MEDICAID

## 2023-03-29 ENCOUNTER — RESULT REVIEW (OUTPATIENT)
Age: 63
End: 2023-03-29

## 2023-03-29 ENCOUNTER — APPOINTMENT (OUTPATIENT)
Dept: INTERNAL MEDICINE | Facility: CLINIC | Age: 63
End: 2023-03-29
Payer: MEDICAID

## 2023-03-29 ENCOUNTER — APPOINTMENT (OUTPATIENT)
Dept: INTERNAL MEDICINE | Facility: CLINIC | Age: 63
End: 2023-03-29

## 2023-03-29 VITALS
SYSTOLIC BLOOD PRESSURE: 125 MMHG | HEART RATE: 79 BPM | DIASTOLIC BLOOD PRESSURE: 76 MMHG | TEMPERATURE: 97.2 F | OXYGEN SATURATION: 97 % | HEIGHT: 59 IN | WEIGHT: 158 LBS | BODY MASS INDEX: 31.85 KG/M2

## 2023-03-29 DIAGNOSIS — R10.13 EPIGASTRIC PAIN: ICD-10-CM

## 2023-03-29 DIAGNOSIS — Z00.00 ENCOUNTER FOR GENERAL ADULT MEDICAL EXAMINATION WITHOUT ABNORMAL FINDINGS: ICD-10-CM

## 2023-03-29 DIAGNOSIS — M79.643 PAIN IN UNSPECIFIED HAND: ICD-10-CM

## 2023-03-29 DIAGNOSIS — Z98.890 OTHER SPECIFIED POSTPROCEDURAL STATES: Chronic | ICD-10-CM

## 2023-03-29 DIAGNOSIS — R42 DIZZINESS AND GIDDINESS: ICD-10-CM

## 2023-03-29 DIAGNOSIS — Z90.49 ACQUIRED ABSENCE OF OTHER SPECIFIED PARTS OF DIGESTIVE TRACT: Chronic | ICD-10-CM

## 2023-03-29 DIAGNOSIS — M25.531 PAIN IN RIGHT WRIST: ICD-10-CM

## 2023-03-29 PROCEDURE — 99214 OFFICE O/P EST MOD 30 MIN: CPT | Mod: GC

## 2023-03-29 PROCEDURE — 73130 X-RAY EXAM OF HAND: CPT | Mod: 26,RT

## 2023-03-29 PROCEDURE — 73130 X-RAY EXAM OF HAND: CPT | Mod: RT

## 2023-03-29 PROCEDURE — 99214 OFFICE O/P EST MOD 30 MIN: CPT

## 2023-03-29 NOTE — HISTORY OF PRESENT ILLNESS
[FreeTextEntry1] : f/u  [de-identified] : Ms Mack is a 62 JERED w a PMH of H pylori gastritis (2x tripple therapy, no test of eradication), Pre-DM, GERD and a Mammogram in Feb 2020 showing BIRADs 2. Platient last seen 2021 by dr Guadalupe. Most recent labs from 2021. Patient presenting today for continuous rt hand aching pain, dizziness that occurs after positional change in bed, rt knee crepitus and pain worse at the end of the day and to find out if H Pylori was eradicate

## 2023-03-29 NOTE — PHYSICAL EXAM
[No Acute Distress] : no acute distress [Well Nourished] : well nourished [Well Developed] : well developed [Normal Sclera/Conjunctiva] : normal sclera/conjunctiva [PERRL] : pupils equal round and reactive to light [Normal Outer Ear/Nose] : the outer ears and nose were normal in appearance [Normal Nasal Mucosa] : the nasal mucosa was normal [No Lymphadenopathy] : no lymphadenopathy [Thyroid Normal, No Nodules] : the thyroid was normal and there were no nodules present [No Respiratory Distress] : no respiratory distress  [Clear to Auscultation] : lungs were clear to auscultation bilaterally [Regular Rhythm] : with a regular rhythm [Normal S1, S2] : normal S1 and S2 [No Carotid Bruits] : no carotid bruits [No Varicosities] : no varicosities [No Edema] : there was no peripheral edema [No Extremity Clubbing/Cyanosis] : no extremity clubbing/cyanosis [Soft] : abdomen soft [Non Tender] : non-tender [No HSM] : no HSM [Normal Anterior Cervical Nodes] : no anterior cervical lymphadenopathy [No Spinal Tenderness] : no spinal tenderness [No Joint Swelling] : no joint swelling [Grossly Normal Strength/Tone] : grossly normal strength/tone [No Rash] : no rash [Coordination Grossly Intact] : coordination grossly intact [No Focal Deficits] : no focal deficits [Speech Grossly Normal] : speech grossly normal [Normal Mood] : the mood was normal [Kyphosis] : no kyphosis [de-identified] : rt tympanic membrane scarred

## 2023-03-29 NOTE — REVIEW OF SYSTEMS
[Fatigue] : fatigue [Hearing Loss] : hearing loss [Palpitations] : palpitations [Joint Pain] : joint pain [Joint Stiffness] : joint stiffness [Muscle Weakness] : muscle weakness [Muscle Pain] : muscle pain [Back Pain] : back pain [Dizziness] : dizziness [Fever] : no fever [Chills] : no chills [Hot Flashes] : no hot flashes [Night Sweats] : no night sweats [Recent Change In Weight] : ~T no recent weight change [Pain] : no pain [Redness] : no redness [Dryness] : no dryness  [Vision Problems] : no vision problems [Itching] : no itching [Earache] : no earache [Nosebleed] : no nosebleeds [Hoarseness] : no hoarseness [Nasal Discharge] : no nasal discharge [Sore Throat] : no sore throat [Postnasal Drip] : no postnasal drip [Chest Pain] : no chest pain [Leg Claudication] : no leg claudication [Lower Ext Edema] : no lower extremity edema [Orthopnea] : no orthopnea [Paroxysmal Nocturnal Dyspnea] : no paroxysmal nocturnal dyspnea [Shortness Of Breath] : no shortness of breath [Wheezing] : no wheezing [Cough] : no cough [Dyspnea on Exertion] : no dyspnea on exertion [Abdominal Pain] : no abdominal pain [Nausea] : no nausea [Constipation] : no constipation [Vomiting] : no vomiting [Heartburn] : no heartburn [Dysuria] : no dysuria [Incontinence] : no incontinence [Hematuria] : no hematuria [Frequency] : no frequency [Vaginal Discharge] : no vaginal discharge [Joint Swelling] : no joint swelling [Itching] : no itching [Mole Changes] : no mole changes [Nail Changes] : no nail changes [Skin Rash] : no skin rash [Headache] : no headache [Fainting] : no fainting [Memory Loss] : no memory loss [Unsteady Walking] : no ataxia [Suicidal] : not suicidal [Insomnia] : no insomnia [Easy Bleeding] : no easy bleeding [Easy Bruising] : no easy bruising [FreeTextEntry2] : occasional palpitations w fatigue  [FreeTextEntry5] : occasional palpitations [FreeTextEntry4] : 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

## 2023-03-29 NOTE — ASSESSMENT
[FreeTextEntry1] : Ms Mack is a 62 JERED w a PMH of H pylori gastritis (2x tripple therapy, no test of eradication), Pre-DM, GERD presenting today for continuous rt hand aching pain, dizziness that occurs after positional change in bed, rt knee crepitus and pain worse at the end of the day and to find out if H Pylori was eradicate \par \par Problem List\par \par #H. 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\par no loss of strength or point tenderness or joint swelling\par DIP joints are very stiff in AM\par Knee and back hurt more at the end of the day\par -f/u Dexa scan (as per patient her Gyn ordered one, can not see on file) to assess for osteoporosis (to risk assess for compressio fracture of spine)\par -f/x XR rt hand\par -tylenol 500mg q6hr PRN\par -voltaren 1% topical cream\par \par #Pre-DM\par patient has continued w diet and lifestyle modifications\par -f/u A1c\par \par Misc:\par -f/u 6 months and prn\par -f/u CBC, CMP, Lipid, TSH, A1c\par -patient due for Mammogram but differed today\par -patient due for next colonoscopy 2025\par

## 2023-03-30 DIAGNOSIS — M25.531 PAIN IN RIGHT WRIST: ICD-10-CM

## 2023-03-30 DIAGNOSIS — R10.13 EPIGASTRIC PAIN: ICD-10-CM

## 2023-03-30 DIAGNOSIS — A04.8 OTHER SPECIFIED BACTERIAL INTESTINAL INFECTIONS: ICD-10-CM

## 2023-03-30 DIAGNOSIS — H91.91 UNSPECIFIED HEARING LOSS, RIGHT EAR: ICD-10-CM

## 2023-03-30 DIAGNOSIS — M79.643 PAIN IN UNSPECIFIED HAND: ICD-10-CM

## 2023-03-30 DIAGNOSIS — R73.03 PREDIABETES: ICD-10-CM

## 2023-03-30 DIAGNOSIS — R42 DIZZINESS AND GIDDINESS: ICD-10-CM

## 2023-06-14 ENCOUNTER — OUTPATIENT (OUTPATIENT)
Dept: OUTPATIENT SERVICES | Facility: HOSPITAL | Age: 63
LOS: 1 days | End: 2023-06-14
Payer: MEDICAID

## 2023-06-14 DIAGNOSIS — Z90.49 ACQUIRED ABSENCE OF OTHER SPECIFIED PARTS OF DIGESTIVE TRACT: Chronic | ICD-10-CM

## 2023-06-14 DIAGNOSIS — Z98.890 OTHER SPECIFIED POSTPROCEDURAL STATES: Chronic | ICD-10-CM

## 2023-06-14 LAB
ALBUMIN SERPL ELPH-MCNC: 4.6 G/DL
ALP BLD-CCNC: 99 U/L
ALT SERPL-CCNC: 14 U/L
ANION GAP SERPL CALC-SCNC: 10 MMOL/L
AST SERPL-CCNC: 19 U/L
BILIRUB SERPL-MCNC: 0.7 MG/DL
BUN SERPL-MCNC: 11 MG/DL
CALCIUM SERPL-MCNC: 8.9 MG/DL
CHLORIDE SERPL-SCNC: 105 MMOL/L
CHOLEST SERPL-MCNC: 185 MG/DL
CO2 SERPL-SCNC: 26 MMOL/L
CREAT SERPL-MCNC: 0.7 MG/DL
EGFR: 97 ML/MIN/1.73M2
ESTIMATED AVERAGE GLUCOSE: 123 MG/DL
GLUCOSE SERPL-MCNC: 93 MG/DL
HBA1C MFR BLD HPLC: 5.9 %
HCT VFR BLD CALC: 42.7 %
HDLC SERPL-MCNC: 54 MG/DL
HGB BLD-MCNC: 13.8 G/DL
LDLC SERPL CALC-MCNC: 115 MG/DL
MCHC RBC-ENTMCNC: 27.5 PG
MCHC RBC-ENTMCNC: 32.3 G/DL
MCV RBC AUTO: 85.1 FL
NONHDLC SERPL-MCNC: 131 MG/DL
PLATELET # BLD AUTO: 235 K/UL
PMV BLD: 11 FL
POTASSIUM SERPL-SCNC: 4.3 MMOL/L
PROT SERPL-MCNC: 6.9 G/DL
RBC # BLD: 5.02 M/UL
RBC # FLD: 12.9 %
SODIUM SERPL-SCNC: 141 MMOL/L
TRIGL SERPL-MCNC: 80 MG/DL
TSH SERPL-ACNC: 2.79 UIU/ML
WBC # FLD AUTO: 4.78 K/UL

## 2023-06-14 PROCEDURE — 85027 COMPLETE CBC AUTOMATED: CPT

## 2023-06-14 PROCEDURE — 84443 ASSAY THYROID STIM HORMONE: CPT

## 2023-06-14 PROCEDURE — 80053 COMPREHEN METABOLIC PANEL: CPT

## 2023-06-14 PROCEDURE — 80061 LIPID PANEL: CPT

## 2023-06-14 PROCEDURE — 83036 HEMOGLOBIN GLYCOSYLATED A1C: CPT

## 2023-06-15 ENCOUNTER — OUTPATIENT (OUTPATIENT)
Dept: OUTPATIENT SERVICES | Facility: HOSPITAL | Age: 63
LOS: 1 days | End: 2023-06-15
Payer: COMMERCIAL

## 2023-06-15 DIAGNOSIS — Z90.49 ACQUIRED ABSENCE OF OTHER SPECIFIED PARTS OF DIGESTIVE TRACT: Chronic | ICD-10-CM

## 2023-06-15 DIAGNOSIS — Z98.890 OTHER SPECIFIED POSTPROCEDURAL STATES: Chronic | ICD-10-CM

## 2023-06-15 PROCEDURE — 87338 HPYLORI STOOL AG IA: CPT

## 2023-06-17 LAB — H PYLORI AG STL QL: POSITIVE

## 2023-07-05 ENCOUNTER — APPOINTMENT (OUTPATIENT)
Dept: INTERNAL MEDICINE | Facility: CLINIC | Age: 63
End: 2023-07-05
Payer: MEDICAID

## 2023-07-05 ENCOUNTER — OUTPATIENT (OUTPATIENT)
Dept: OUTPATIENT SERVICES | Facility: HOSPITAL | Age: 63
LOS: 1 days | End: 2023-07-05
Payer: MEDICAID

## 2023-07-05 VITALS
BODY MASS INDEX: 32.66 KG/M2 | WEIGHT: 162 LBS | HEIGHT: 59 IN | SYSTOLIC BLOOD PRESSURE: 124 MMHG | TEMPERATURE: 97.8 F | DIASTOLIC BLOOD PRESSURE: 69 MMHG | OXYGEN SATURATION: 97 % | HEART RATE: 67 BPM

## 2023-07-05 DIAGNOSIS — Z90.49 ACQUIRED ABSENCE OF OTHER SPECIFIED PARTS OF DIGESTIVE TRACT: Chronic | ICD-10-CM

## 2023-07-05 DIAGNOSIS — M79.672 PAIN IN LEFT FOOT: ICD-10-CM

## 2023-07-05 DIAGNOSIS — Z98.890 OTHER SPECIFIED POSTPROCEDURAL STATES: Chronic | ICD-10-CM

## 2023-07-05 DIAGNOSIS — Z00.00 ENCOUNTER FOR GENERAL ADULT MEDICAL EXAMINATION WITHOUT ABNORMAL FINDINGS: ICD-10-CM

## 2023-07-05 PROCEDURE — 99214 OFFICE O/P EST MOD 30 MIN: CPT | Mod: GC

## 2023-07-05 PROCEDURE — 99214 OFFICE O/P EST MOD 30 MIN: CPT

## 2023-07-05 NOTE — REVIEW OF SYSTEMS
[Joint Pain] : joint pain [Fever] : no fever [Chills] : no chills [Night Sweats] : no night sweats [Recent Change In Weight] : ~T no recent weight change [Vision Problems] : no vision problems [Hearing Loss] : no hearing loss [Nasal Discharge] : no nasal discharge [Sore Throat] : no sore throat [Chest Pain] : no chest pain [Palpitations] : no palpitations [Orthopnea] : no orthopnea [Paroxysmal Nocturnal Dyspnea] : no paroxysmal nocturnal dyspnea [Shortness Of Breath] : no shortness of breath [Wheezing] : no wheezing [Abdominal Pain] : no abdominal pain [Nausea] : no nausea [Constipation] : no constipation [Vomiting] : no vomiting [Heartburn] : no heartburn [Melena] : no melena [Joint Stiffness] : no joint stiffness [Joint Swelling] : no joint swelling [Muscle Pain] : no muscle pain [Itching] : no itching [Skin Rash] : no skin rash [FreeTextEntry9] : left heel pain

## 2023-07-05 NOTE — PHYSICAL EXAM
[No Acute Distress] : no acute distress [No JVD] : no jugular venous distention [No Lymphadenopathy] : no lymphadenopathy [No Respiratory Distress] : no respiratory distress  [No Accessory Muscle Use] : no accessory muscle use [Clear to Auscultation] : lungs were clear to auscultation bilaterally [Normal Rate] : normal rate  [Regular Rhythm] : with a regular rhythm [Normal S1, S2] : normal S1 and S2 [No Carotid Bruits] : no carotid bruits [Pedal Pulses Present] : the pedal pulses are present [Soft] : abdomen soft [Non Tender] : non-tender [Non-distended] : non-distended [Normal Bowel Sounds] : normal bowel sounds [No CVA Tenderness] : no CVA  tenderness [No Joint Swelling] : no joint swelling [Grossly Normal Strength/Tone] : grossly normal strength/tone

## 2023-07-05 NOTE — ASSESSMENT
[FreeTextEntry1] : Ms Mack is a 62 JERED w a PMH of H pylori gastritis (2x tripple therapy), Pre-DM, GERD and a Mammogram in Feb 2020 showing BIRADs 2 presenting for follow up. \par \par #H. 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 
Awake/Alert

## 2023-07-05 NOTE — HISTORY OF PRESENT ILLNESS
[FreeTextEntry1] : follow up  [de-identified] : Ms Mack is a 62 JERED w a PMH of H pylori gastritis (2x tripple therapy), Pre-DM, GERD and a Mammogram in Feb 2020 showing BIRADs 2. Patient is doing well, no epigastric pain or GERD symptoms. She has regurgitation when she eats spicy food. She is complaining of left heel pain that has been there for a long period of time, it used to come and go but now is persistent. 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

## 2023-07-07 DIAGNOSIS — M79.672 PAIN IN LEFT FOOT: ICD-10-CM

## 2023-07-07 DIAGNOSIS — Z00.00 ENCOUNTER FOR GENERAL ADULT MEDICAL EXAMINATION WITHOUT ABNORMAL FINDINGS: ICD-10-CM

## 2023-07-07 DIAGNOSIS — A04.8 OTHER SPECIFIED BACTERIAL INTESTINAL INFECTIONS: ICD-10-CM

## 2023-07-23 ENCOUNTER — NON-APPOINTMENT (OUTPATIENT)
Age: 63
End: 2023-07-23

## 2023-07-24 ENCOUNTER — OUTPATIENT (OUTPATIENT)
Dept: OUTPATIENT SERVICES | Facility: HOSPITAL | Age: 63
LOS: 1 days | End: 2023-07-24
Payer: MEDICAID

## 2023-07-24 ENCOUNTER — APPOINTMENT (OUTPATIENT)
Dept: INTERNAL MEDICINE | Facility: CLINIC | Age: 63
End: 2023-07-24
Payer: MEDICAID

## 2023-07-24 ENCOUNTER — NON-APPOINTMENT (OUTPATIENT)
Age: 63
End: 2023-07-24

## 2023-07-24 VITALS
BODY MASS INDEX: 32.51 KG/M2 | WEIGHT: 161.25 LBS | TEMPERATURE: 97.2 F | HEIGHT: 59 IN | SYSTOLIC BLOOD PRESSURE: 122 MMHG | HEART RATE: 65 BPM | OXYGEN SATURATION: 98 % | DIASTOLIC BLOOD PRESSURE: 83 MMHG

## 2023-07-24 DIAGNOSIS — J06.9 ACUTE UPPER RESPIRATORY INFECTION, UNSPECIFIED: ICD-10-CM

## 2023-07-24 DIAGNOSIS — Z00.00 ENCOUNTER FOR GENERAL ADULT MEDICAL EXAMINATION WITHOUT ABNORMAL FINDINGS: ICD-10-CM

## 2023-07-24 DIAGNOSIS — Z90.49 ACQUIRED ABSENCE OF OTHER SPECIFIED PARTS OF DIGESTIVE TRACT: Chronic | ICD-10-CM

## 2023-07-24 DIAGNOSIS — Z98.890 OTHER SPECIFIED POSTPROCEDURAL STATES: Chronic | ICD-10-CM

## 2023-07-24 PROCEDURE — 99213 OFFICE O/P EST LOW 20 MIN: CPT | Mod: GC

## 2023-07-24 PROCEDURE — 99213 OFFICE O/P EST LOW 20 MIN: CPT

## 2023-07-24 NOTE — INTERPRETER SERVICES
[[employer] ] : provided by [employer]   [Interpreters_IDNumber] : [id number] [Interpreters_FullName] : Jose Carlos

## 2023-07-24 NOTE — HISTORY OF PRESENT ILLNESS
[FreeTextEntry1] : Cough 3 days w/ SOB [de-identified] : Patient is a 62 year old female w/ PMH of H pylori gastritis (2x tripple therapy), Pre-DM, GERD and a Mammogram in Feb 2020 showing BIRADs 2 presenting today with a dry cough of 3 days duration associated with shortness of breath and rib pain. Patient denies nausea, fever, diarrhea, or joint pains. Patient denies sick contacts or recent travel. Patient received COVID vaccine x2 but not the flu vaccine.\par \par GI and ENT appointments scheduled, needs to follow up with podiatry.

## 2023-07-24 NOTE — ASSESSMENT
[FreeTextEntry1] : Patient is a 62 year old female w/ PMH of H pylori gastritis (2x tripple therapy), Pre-DM, GERD and a Mammogram in Feb 2020 showing BIRADs 2 presenting today with a dry cough of 3 days duration associated with shortness of breath and rib pain.\par \par #Cough, likely secondary to viral illness\par -Patient counseled on supportive measures such as resting and increasing fluid intake\par -Viral panel \par -Fexofenadine 180 mg at bedtime, Tylenol PRN\par \par RTC in 6 months or PRN

## 2023-07-25 DIAGNOSIS — J06.9 ACUTE UPPER RESPIRATORY INFECTION, UNSPECIFIED: ICD-10-CM

## 2023-07-28 ENCOUNTER — OUTPATIENT (OUTPATIENT)
Dept: OUTPATIENT SERVICES | Facility: HOSPITAL | Age: 63
LOS: 1 days | End: 2023-07-28
Payer: MEDICAID

## 2023-07-28 ENCOUNTER — RESULT REVIEW (OUTPATIENT)
Age: 63
End: 2023-07-28

## 2023-07-28 ENCOUNTER — APPOINTMENT (OUTPATIENT)
Dept: PODIATRY | Facility: CLINIC | Age: 63
End: 2023-07-28
Payer: MEDICAID

## 2023-07-28 DIAGNOSIS — Z98.890 OTHER SPECIFIED POSTPROCEDURAL STATES: Chronic | ICD-10-CM

## 2023-07-28 DIAGNOSIS — M72.2 PLANTAR FASCIAL FIBROMATOSIS: ICD-10-CM

## 2023-07-28 DIAGNOSIS — M79.671 PAIN IN RIGHT FOOT: ICD-10-CM

## 2023-07-28 DIAGNOSIS — Z00.00 ENCOUNTER FOR GENERAL ADULT MEDICAL EXAMINATION WITHOUT ABNORMAL FINDINGS: ICD-10-CM

## 2023-07-28 DIAGNOSIS — Z90.49 ACQUIRED ABSENCE OF OTHER SPECIFIED PARTS OF DIGESTIVE TRACT: Chronic | ICD-10-CM

## 2023-07-28 PROCEDURE — 99203 OFFICE O/P NEW LOW 30 MIN: CPT | Mod: 25

## 2023-07-28 PROCEDURE — 20551 NJX 1 TENDON ORIGIN/INSJ: CPT

## 2023-07-28 PROCEDURE — 99203 OFFICE O/P NEW LOW 30 MIN: CPT

## 2023-07-28 NOTE — PHYSICAL EXAM
[General Appearance - Alert] : alert [General Appearance - In No Acute Distress] : in no acute distress [Ankle Swelling (On Exam)] : not present [Varicose Veins Of Lower Extremities] : not present [Delayed in the Right Toes] : capillary refills normal in right toes [Delayed in the Left Toes] : capillary refills normal in the left toes [2+] : left foot dorsalis pedis 2+ [No Joint Swelling] : no joint swelling [Normal Foot/Ankle] : Both lower extremities were exposed and visualized. Standing exam demonstrates normal foot posture and alignment. Hindfoot exam shows no hindfoot valgus or varus [de-identified] : Pain on palpation of bilateral heels with pain worse on the left and with increased dorsal foot [Skin Color & Pigmentation] : normal skin color and pigmentation [Skin Turgor] : normal skin turgor [] : no rash [Skin Lesions] : no skin lesions [Foot Ulcer] : no foot ulcer [Skin Induration] : no skin induration [Sensation] : the sensory exam was normal to light touch and pinprick [No Focal Deficits] : no focal deficits [Deep Tendon Reflexes (DTR)] : deep tendon reflexes were 2+ and symmetric [Motor Exam] : the motor exam was normal [Oriented To Time, Place, And Person] : oriented to person, place, and time [Impaired Insight] : insight and judgment were intact [Affect] : the affect was normal

## 2023-07-28 NOTE — PROCEDURE
[FreeTextEntry1] : Patient examined, history and chart reviewed\par injection performed with 1% lidocaine and Betamethasone for a total of 1.5cc. Injection performed under sterile technique left foot\par Prescribed meloxicam 15 mg once daily for 15 days\par Prescribed x-ray bilateral feet evaluation of posterior heel rule out fracture or heel spur

## 2023-07-28 NOTE — HISTORY OF PRESENT ILLNESS
[FreeTextEntry1] : 62-year-old female patient presents with bilateral foot pain plantar heel\par \par Patient states that the left is worse than the right\par \par Pain on first step in the morning \par \par And gets better with walking\par \par denies nausea vomiting fevers chills shortness of breath

## 2023-08-02 ENCOUNTER — APPOINTMENT (OUTPATIENT)
Dept: OTOLARYNGOLOGY | Facility: CLINIC | Age: 63
End: 2023-08-02
Payer: COMMERCIAL

## 2023-08-02 ENCOUNTER — OUTPATIENT (OUTPATIENT)
Dept: OUTPATIENT SERVICES | Facility: HOSPITAL | Age: 63
LOS: 1 days | End: 2023-08-02
Payer: MEDICAID

## 2023-08-02 VITALS — HEIGHT: 59.06 IN | BODY MASS INDEX: 32.46 KG/M2 | WEIGHT: 161 LBS

## 2023-08-02 DIAGNOSIS — H93.8X2 OTHER SPECIFIED DISORDERS OF LEFT EAR: ICD-10-CM

## 2023-08-02 DIAGNOSIS — H61.22 IMPACTED CERUMEN, LEFT EAR: ICD-10-CM

## 2023-08-02 DIAGNOSIS — M72.2 PLANTAR FASCIAL FIBROMATOSIS: ICD-10-CM

## 2023-08-02 DIAGNOSIS — Z90.49 ACQUIRED ABSENCE OF OTHER SPECIFIED PARTS OF DIGESTIVE TRACT: Chronic | ICD-10-CM

## 2023-08-02 DIAGNOSIS — Z87.09 PERSONAL HISTORY OF OTHER DISEASES OF THE RESPIRATORY SYSTEM: ICD-10-CM

## 2023-08-02 DIAGNOSIS — Z98.890 OTHER SPECIFIED POSTPROCEDURAL STATES: Chronic | ICD-10-CM

## 2023-08-02 PROCEDURE — 73630 X-RAY EXAM OF FOOT: CPT | Mod: 26,50

## 2023-08-02 PROCEDURE — 92557 COMPREHENSIVE HEARING TEST: CPT

## 2023-08-02 PROCEDURE — 99203 OFFICE O/P NEW LOW 30 MIN: CPT | Mod: 25

## 2023-08-02 PROCEDURE — 92550 TYMPANOMETRY & REFLEX THRESH: CPT | Mod: 52

## 2023-08-02 PROCEDURE — 73630 X-RAY EXAM OF FOOT: CPT | Mod: 50

## 2023-08-02 PROCEDURE — 31231 NASAL ENDOSCOPY DX: CPT

## 2023-08-02 PROCEDURE — G0268 REMOVAL OF IMPACTED WAX MD: CPT

## 2023-08-02 NOTE — HISTORY OF PRESENT ILLNESS
[de-identified] : Indonesian ID: [id number],  [id number] Patient presents today c/o hearing loss, nasal polyp. States that she is not able to hear from right ear. About 4 years ago she had flu and went to ED. Around that time she had blood and fluid that came out of ear. ED recommended to stay hydrated and did not prescribe any medications or perform any tests. Since then she is not able to hear clearly from right ear. Minimal hearing since then. When laying down she feels occasional dizziness. History of ear infections as child. No complaints for left ear, hearing well. Sometimes here buzzing noise in the right ear and it can get pretty loud and constant.  States she also has small polyp in upper left nostril. No difficulty breathing or complaints of pain.

## 2023-08-02 NOTE — PHYSICAL EXAM
[] : septum deviated to the right [Normal] : tympanic membranes are normal in both ears [de-identified] : Left ear cerumen impaction removed with curette.

## 2023-08-02 NOTE — ASSESSMENT
[FreeTextEntry1] : Wax found and cleaned. Cleaning well tolerated by patient. Patient felt improvement in cloginess after cleaning.  I reviewed, interpreted, and discussed the Audiogram done today. Bilateral ears congestion.  Repeat in 1M after flonase.  Nasal congestion on exam.

## 2023-08-02 NOTE — PROCEDURE
[Mass] : a mass [Anterior rhinoscopy insufficient to account for symptoms] : anterior rhinoscopy insufficient to account for symptoms [None] : none [Flexible Endoscope] : examined with the flexible endoscope [S-Shaped Deviated] : S-shape deviation [Normal] : the paranasal sinuses had no abnormalities

## 2023-08-03 DIAGNOSIS — M72.2 PLANTAR FASCIAL FIBROMATOSIS: ICD-10-CM

## 2023-08-07 DIAGNOSIS — M72.2 PLANTAR FASCIAL FIBROMATOSIS: ICD-10-CM

## 2023-08-07 DIAGNOSIS — M79.671 PAIN IN RIGHT FOOT: ICD-10-CM

## 2023-08-28 ENCOUNTER — APPOINTMENT (OUTPATIENT)
Dept: PODIATRY | Facility: CLINIC | Age: 63
End: 2023-08-28

## 2023-09-06 DIAGNOSIS — Z00.00 ENCOUNTER FOR GENERAL ADULT MEDICAL EXAMINATION WITHOUT ABNORMAL FINDINGS: ICD-10-CM

## 2023-09-07 DIAGNOSIS — Z00.00 ENCOUNTER FOR GENERAL ADULT MEDICAL EXAMINATION WITHOUT ABNORMAL FINDINGS: ICD-10-CM

## 2023-09-08 ENCOUNTER — RESULT REVIEW (OUTPATIENT)
Age: 63
End: 2023-09-08

## 2023-09-08 ENCOUNTER — OUTPATIENT (OUTPATIENT)
Dept: OUTPATIENT SERVICES | Facility: HOSPITAL | Age: 63
LOS: 1 days | End: 2023-09-08
Payer: COMMERCIAL

## 2023-09-08 DIAGNOSIS — Z12.31 ENCOUNTER FOR SCREENING MAMMOGRAM FOR MALIGNANT NEOPLASM OF BREAST: ICD-10-CM

## 2023-09-08 DIAGNOSIS — Z98.890 OTHER SPECIFIED POSTPROCEDURAL STATES: Chronic | ICD-10-CM

## 2023-09-08 DIAGNOSIS — Z90.49 ACQUIRED ABSENCE OF OTHER SPECIFIED PARTS OF DIGESTIVE TRACT: Chronic | ICD-10-CM

## 2023-09-08 PROCEDURE — 77067 SCR MAMMO BI INCL CAD: CPT | Mod: 26

## 2023-09-08 PROCEDURE — 77063 BREAST TOMOSYNTHESIS BI: CPT | Mod: 26

## 2023-09-08 PROCEDURE — 77063 BREAST TOMOSYNTHESIS BI: CPT

## 2023-09-08 PROCEDURE — 77067 SCR MAMMO BI INCL CAD: CPT

## 2023-09-09 DIAGNOSIS — Z12.31 ENCOUNTER FOR SCREENING MAMMOGRAM FOR MALIGNANT NEOPLASM OF BREAST: ICD-10-CM

## 2023-09-13 ENCOUNTER — APPOINTMENT (OUTPATIENT)
Dept: OTOLARYNGOLOGY | Facility: CLINIC | Age: 63
End: 2023-09-13
Payer: COMMERCIAL

## 2023-09-13 VITALS — BODY MASS INDEX: 32.46 KG/M2 | WEIGHT: 161 LBS

## 2023-09-13 DIAGNOSIS — H91.91 UNSPECIFIED HEARING LOSS, RIGHT EAR: ICD-10-CM

## 2023-09-13 DIAGNOSIS — H65.91 UNSPECIFIED NONSUPPURATIVE OTITIS MEDIA, RIGHT EAR: ICD-10-CM

## 2023-09-13 DIAGNOSIS — H91.90 UNSPECIFIED HEARING LOSS, UNSPECIFIED EAR: ICD-10-CM

## 2023-09-13 PROCEDURE — 92550 TYMPANOMETRY & REFLEX THRESH: CPT | Mod: 52

## 2023-09-13 PROCEDURE — 99213 OFFICE O/P EST LOW 20 MIN: CPT | Mod: 25

## 2023-09-13 PROCEDURE — 92557 COMPREHENSIVE HEARING TEST: CPT

## 2023-09-13 PROCEDURE — 69420 INCISION OF EARDRUM: CPT | Mod: RT

## 2023-09-13 PROCEDURE — 31231 NASAL ENDOSCOPY DX: CPT

## 2023-09-15 ENCOUNTER — OUTPATIENT (OUTPATIENT)
Dept: OUTPATIENT SERVICES | Facility: HOSPITAL | Age: 63
LOS: 1 days | End: 2023-09-15
Payer: COMMERCIAL

## 2023-09-15 ENCOUNTER — APPOINTMENT (OUTPATIENT)
Dept: GASTROENTEROLOGY | Facility: CLINIC | Age: 63
End: 2023-09-15
Payer: COMMERCIAL

## 2023-09-15 VITALS
SYSTOLIC BLOOD PRESSURE: 127 MMHG | OXYGEN SATURATION: 98 % | HEART RATE: 65 BPM | DIASTOLIC BLOOD PRESSURE: 85 MMHG | WEIGHT: 161 LBS | BODY MASS INDEX: 32.46 KG/M2 | TEMPERATURE: 97 F | HEIGHT: 59.06 IN

## 2023-09-15 DIAGNOSIS — K31.A0 GASTRIC INTESTINAL METAPLASIA, UNSPECIFIED: ICD-10-CM

## 2023-09-15 DIAGNOSIS — A04.8 OTHER SPECIFIED BACTERIAL INTESTINAL INFECTIONS: ICD-10-CM

## 2023-09-15 DIAGNOSIS — Z98.890 OTHER SPECIFIED POSTPROCEDURAL STATES: Chronic | ICD-10-CM

## 2023-09-15 DIAGNOSIS — Z90.49 ACQUIRED ABSENCE OF OTHER SPECIFIED PARTS OF DIGESTIVE TRACT: Chronic | ICD-10-CM

## 2023-09-15 DIAGNOSIS — Z00.00 ENCOUNTER FOR GENERAL ADULT MEDICAL EXAMINATION WITHOUT ABNORMAL FINDINGS: ICD-10-CM

## 2023-09-15 DIAGNOSIS — R10.13 EPIGASTRIC PAIN: ICD-10-CM

## 2023-09-15 PROCEDURE — 99204 OFFICE O/P NEW MOD 45 MIN: CPT

## 2023-09-15 PROCEDURE — 99204 OFFICE O/P NEW MOD 45 MIN: CPT | Mod: GC

## 2023-09-15 RX ORDER — ACETAMINOPHEN 500 MG/1
500 TABLET ORAL EVERY 8 HOURS
Qty: 30 | Refills: 0 | Status: COMPLETED | COMMUNITY
Start: 2023-03-29 | End: 2023-09-15

## 2023-09-15 RX ORDER — MELOXICAM 15 MG/1
15 TABLET ORAL DAILY
Qty: 15 | Refills: 3 | Status: COMPLETED | COMMUNITY
Start: 2023-07-28 | End: 2023-09-15

## 2023-09-15 RX ORDER — DICLOFENAC SODIUM 1% 10 MG/G
1 GEL TOPICAL DAILY
Qty: 1 | Refills: 0 | Status: COMPLETED | COMMUNITY
Start: 2023-03-29 | End: 2023-09-15

## 2023-09-15 RX ORDER — FEXOFENADINE HYDROCHLORIDE 180 MG/1
180 TABLET ORAL AT BEDTIME
Qty: 7 | Refills: 1 | Status: COMPLETED | COMMUNITY
Start: 2023-07-24 | End: 2023-09-15

## 2023-09-19 DIAGNOSIS — Z00.00 ENCOUNTER FOR GENERAL ADULT MEDICAL EXAMINATION WITHOUT ABNORMAL FINDINGS: ICD-10-CM

## 2023-09-20 DIAGNOSIS — Z00.00 ENCOUNTER FOR GENERAL ADULT MEDICAL EXAMINATION WITHOUT ABNORMAL FINDINGS: ICD-10-CM

## 2023-09-27 ENCOUNTER — APPOINTMENT (OUTPATIENT)
Dept: OTOLARYNGOLOGY | Facility: CLINIC | Age: 63
End: 2023-09-27

## 2023-10-05 ENCOUNTER — APPOINTMENT (OUTPATIENT)
Dept: INTERNAL MEDICINE | Facility: CLINIC | Age: 63
End: 2023-10-05
Payer: COMMERCIAL

## 2023-10-05 ENCOUNTER — OUTPATIENT (OUTPATIENT)
Dept: OUTPATIENT SERVICES | Facility: HOSPITAL | Age: 63
LOS: 1 days | End: 2023-10-05
Payer: COMMERCIAL

## 2023-10-05 VITALS
HEART RATE: 97 BPM | OXYGEN SATURATION: 98 % | DIASTOLIC BLOOD PRESSURE: 88 MMHG | WEIGHT: 161 LBS | TEMPERATURE: 98 F | BODY MASS INDEX: 32.03 KG/M2 | HEIGHT: 59.6 IN | SYSTOLIC BLOOD PRESSURE: 150 MMHG

## 2023-10-05 VITALS — SYSTOLIC BLOOD PRESSURE: 139 MMHG | DIASTOLIC BLOOD PRESSURE: 83 MMHG

## 2023-10-05 DIAGNOSIS — R73.03 PREDIABETES.: ICD-10-CM

## 2023-10-05 DIAGNOSIS — Z00.00 ENCOUNTER FOR GENERAL ADULT MEDICAL EXAMINATION WITHOUT ABNORMAL FINDINGS: ICD-10-CM

## 2023-10-05 DIAGNOSIS — Z98.890 OTHER SPECIFIED POSTPROCEDURAL STATES: Chronic | ICD-10-CM

## 2023-10-05 DIAGNOSIS — R09.81 NASAL CONGESTION: ICD-10-CM

## 2023-10-05 DIAGNOSIS — N89.8 OTHER SPECIFIED NONINFLAMMATORY DISORDERS OF VAGINA: ICD-10-CM

## 2023-10-05 DIAGNOSIS — Z00.00 ENCOUNTER FOR GENERAL ADULT MEDICAL EXAMINATION W/OUT ABNORMAL FINDINGS: ICD-10-CM

## 2023-10-05 DIAGNOSIS — M72.2 PLANTAR FASCIAL FIBROMATOSIS: ICD-10-CM

## 2023-10-05 DIAGNOSIS — Z90.49 ACQUIRED ABSENCE OF OTHER SPECIFIED PARTS OF DIGESTIVE TRACT: Chronic | ICD-10-CM

## 2023-10-05 PROCEDURE — 99214 OFFICE O/P EST MOD 30 MIN: CPT | Mod: GC

## 2023-10-05 PROCEDURE — 99214 OFFICE O/P EST MOD 30 MIN: CPT

## 2023-10-05 RX ORDER — FLUTICASONE PROPIONATE 50 UG/1
50 SPRAY, METERED NASAL
Qty: 1 | Refills: 4 | Status: ACTIVE | COMMUNITY
Start: 2023-08-02 | End: 1900-01-01

## 2023-10-05 RX ORDER — BISMUTH SUBSALICYLATE 262 MG/1
262 TABLET, CHEWABLE ORAL 4 TIMES DAILY
Qty: 112 | Refills: 0 | Status: DISCONTINUED | COMMUNITY
Start: 2023-09-15 | End: 2023-10-05

## 2023-10-05 RX ORDER — PANTOPRAZOLE 40 MG/1
40 TABLET, DELAYED RELEASE ORAL
Qty: 28 | Refills: 0 | Status: DISCONTINUED | COMMUNITY
Start: 2023-09-15 | End: 2023-10-05

## 2023-10-05 RX ORDER — METRONIDAZOLE 500 MG/1
500 TABLET ORAL EVERY 6 HOURS
Qty: 56 | Refills: 0 | Status: DISCONTINUED | COMMUNITY
Start: 2023-09-15 | End: 2023-10-05

## 2023-10-05 RX ORDER — TETRACYCLINE HYDROCHLORIDE 500 MG/1
500 CAPSULE ORAL
Qty: 56 | Refills: 0 | Status: DISCONTINUED | COMMUNITY
Start: 2023-09-15 | End: 2023-10-05

## 2023-10-10 DIAGNOSIS — R73.03 PREDIABETES: ICD-10-CM

## 2023-10-10 DIAGNOSIS — M72.2 PLANTAR FASCIAL FIBROMATOSIS: ICD-10-CM

## 2023-10-10 DIAGNOSIS — R09.81 NASAL CONGESTION: ICD-10-CM

## 2023-10-10 DIAGNOSIS — Z00.00 ENCOUNTER FOR GENERAL ADULT MEDICAL EXAMINATION WITHOUT ABNORMAL FINDINGS: ICD-10-CM

## 2023-10-10 DIAGNOSIS — N89.8 OTHER SPECIFIED NONINFLAMMATORY DISORDERS OF VAGINA: ICD-10-CM

## 2023-10-25 NOTE — ED ADULT NURSE NOTE - CHPI ED NUR SYMPTOMS NEG
Patient
no hematuria/no nausea/no vomiting/no abdominal distension/no blood in stool/no fever/no burning urination/no diarrhea/no chills/no dysuria

## 2023-11-14 NOTE — ASU PATIENT PROFILE, ADULT - ACCEPTABLE
-Acetaminophen  orally every 4 hours as needed for fever or pain. Do not combine with other products containing acetaminophen.      -Ibuprofen  orally every 6 hours as needed for fever or pain. Take with food. Discontinue use and seek medical attention with blood in vomit or stool, coffee ground vomit, or dark black stool.     -Encourage fluids.     -Use a cool air humidifier as needed. -May administer 5 mL of organic honey as needed for cough in patients greater than 1 year of age. Do not administer over-the-counter cough and cold medications without discussing with your physician or the pharmacist as they may not be safe in children.     -Follow-up with a primary care provider within 3 days if fever persists or sooner if necessary.     -Please monitor for and seek medical attention with persistent fever, difficulties breathing, increased work of breathing, abdominal pain, persistent vomiting with concerns for dehydration, or any other concerns.
0

## 2023-12-08 ENCOUNTER — OUTPATIENT (OUTPATIENT)
Dept: OUTPATIENT SERVICES | Facility: HOSPITAL | Age: 63
LOS: 1 days | End: 2023-12-08
Payer: COMMERCIAL

## 2023-12-08 DIAGNOSIS — Z90.49 ACQUIRED ABSENCE OF OTHER SPECIFIED PARTS OF DIGESTIVE TRACT: Chronic | ICD-10-CM

## 2023-12-08 DIAGNOSIS — Z00.00 ENCOUNTER FOR GENERAL ADULT MEDICAL EXAMINATION WITHOUT ABNORMAL FINDINGS: ICD-10-CM

## 2023-12-08 DIAGNOSIS — Z98.890 OTHER SPECIFIED POSTPROCEDURAL STATES: Chronic | ICD-10-CM

## 2023-12-08 LAB
25(OH)D3 SERPL-MCNC: 26 NG/ML
ALBUMIN SERPL ELPH-MCNC: 4.5 G/DL
ALP BLD-CCNC: 103 U/L
ALT SERPL-CCNC: 13 U/L
ANION GAP SERPL CALC-SCNC: 11 MMOL/L
AST SERPL-CCNC: 20 U/L
BILIRUB SERPL-MCNC: 0.3 MG/DL
BUN SERPL-MCNC: 11 MG/DL
CALCIUM SERPL-MCNC: 9.3 MG/DL
CHLORIDE SERPL-SCNC: 104 MMOL/L
CHOLEST SERPL-MCNC: 188 MG/DL
CO2 SERPL-SCNC: 27 MMOL/L
CREAT SERPL-MCNC: 0.7 MG/DL
EGFR: 97 ML/MIN/1.73M2
ESTIMATED AVERAGE GLUCOSE: 131 MG/DL
GLUCOSE SERPL-MCNC: 102 MG/DL
HBA1C MFR BLD HPLC: 6.2 %
HDLC SERPL-MCNC: 55 MG/DL
LDLC SERPL CALC-MCNC: 116 MG/DL
NONHDLC SERPL-MCNC: 133 MG/DL
POTASSIUM SERPL-SCNC: 4.5 MMOL/L
PROT SERPL-MCNC: 7.2 G/DL
SODIUM SERPL-SCNC: 142 MMOL/L
TRIGL SERPL-MCNC: 83 MG/DL
TSH SERPL-ACNC: 2.48 UIU/ML

## 2023-12-08 PROCEDURE — 83036 HEMOGLOBIN GLYCOSYLATED A1C: CPT

## 2023-12-08 PROCEDURE — 84443 ASSAY THYROID STIM HORMONE: CPT

## 2023-12-08 PROCEDURE — 82306 VITAMIN D 25 HYDROXY: CPT

## 2023-12-08 PROCEDURE — 80061 LIPID PANEL: CPT

## 2023-12-08 PROCEDURE — 80053 COMPREHEN METABOLIC PANEL: CPT

## 2023-12-08 PROCEDURE — 36415 COLL VENOUS BLD VENIPUNCTURE: CPT

## 2023-12-09 DIAGNOSIS — Z00.00 ENCOUNTER FOR GENERAL ADULT MEDICAL EXAMINATION WITHOUT ABNORMAL FINDINGS: ICD-10-CM

## 2023-12-14 ENCOUNTER — RESULT REVIEW (OUTPATIENT)
Age: 63
End: 2023-12-14

## 2023-12-14 ENCOUNTER — TRANSCRIPTION ENCOUNTER (OUTPATIENT)
Age: 63
End: 2023-12-14

## 2023-12-14 ENCOUNTER — OUTPATIENT (OUTPATIENT)
Dept: OUTPATIENT SERVICES | Facility: HOSPITAL | Age: 63
LOS: 1 days | Discharge: HOME | End: 2023-12-14
Payer: COMMERCIAL

## 2023-12-14 VITALS
TEMPERATURE: 98 F | HEART RATE: 73 BPM | SYSTOLIC BLOOD PRESSURE: 127 MMHG | RESPIRATION RATE: 18 BRPM | DIASTOLIC BLOOD PRESSURE: 66 MMHG | WEIGHT: 160.06 LBS

## 2023-12-14 VITALS
SYSTOLIC BLOOD PRESSURE: 120 MMHG | RESPIRATION RATE: 18 BRPM | OXYGEN SATURATION: 100 % | DIASTOLIC BLOOD PRESSURE: 80 MMHG | HEART RATE: 78 BPM

## 2023-12-14 DIAGNOSIS — K31.A0 GASTRIC INTESTINAL METAPLASIA, UNSPECIFIED: ICD-10-CM

## 2023-12-14 DIAGNOSIS — Z90.49 ACQUIRED ABSENCE OF OTHER SPECIFIED PARTS OF DIGESTIVE TRACT: Chronic | ICD-10-CM

## 2023-12-14 DIAGNOSIS — Z98.890 OTHER SPECIFIED POSTPROCEDURAL STATES: Chronic | ICD-10-CM

## 2023-12-14 PROCEDURE — 88341 IMHCHEM/IMCYTCHM EA ADD ANTB: CPT

## 2023-12-14 PROCEDURE — 88305 TISSUE EXAM BY PATHOLOGIST: CPT | Mod: 26

## 2023-12-14 PROCEDURE — 88341 IMHCHEM/IMCYTCHM EA ADD ANTB: CPT | Mod: 26

## 2023-12-14 PROCEDURE — 88312 SPECIAL STAINS GROUP 1: CPT

## 2023-12-14 PROCEDURE — 88342 IMHCHEM/IMCYTCHM 1ST ANTB: CPT | Mod: 26

## 2023-12-14 PROCEDURE — 88305 TISSUE EXAM BY PATHOLOGIST: CPT

## 2023-12-14 PROCEDURE — 88312 SPECIAL STAINS GROUP 1: CPT | Mod: 26

## 2023-12-14 PROCEDURE — 43239 EGD BIOPSY SINGLE/MULTIPLE: CPT

## 2023-12-14 PROCEDURE — 88342 IMHCHEM/IMCYTCHM 1ST ANTB: CPT

## 2023-12-14 NOTE — ASU PATIENT PROFILE, ADULT - FALL HARM RISK - UNIVERSAL INTERVENTIONS
Bed in lowest position, wheels locked, appropriate side rails in place/Call bell, personal items and telephone in reach/Instruct patient to call for assistance before getting out of bed or chair/Non-slip footwear when patient is out of bed/[place] to call system/Physically safe environment - no spills, clutter or unnecessary equipment/Purposeful Proactive Rounding/Room/bathroom lighting operational, light cord in reach Bed in lowest position, wheels locked, appropriate side rails in place/Call bell, personal items and telephone in reach/Instruct patient to call for assistance before getting out of bed or chair/Non-slip footwear when patient is out of bed/[place] to call system/Physically safe environment - no spills, clutter or unnecessary equipment/Purposeful Proactive Rounding/Room/bathroom lighting operational, light cord in reach

## 2023-12-14 NOTE — PRE-ANESTHESIA EVALUATION ADULT - NSRADCARDRESULTSFT_GEN_ALL_CORE
Ventricular Rate 62 BPM    Atrial Rate 62 BPM    P-R Interval 158 ms    QRS Duration 90 ms    Q-T Interval 392 ms    QTC Calculation(Bezet) 397 ms    P Axis 35 degrees    R Axis 50 degrees    T Axis 43 degrees    Diagnosis Line Normal sinus rhythm  Normal ECG

## 2023-12-14 NOTE — PRE-ANESTHESIA EVALUATION ADULT - NSANTHPMHFT_GEN_ALL_CORE
62 y/o pt here for egd/w mapping.    PAST SURGICAL HISTORY:  H/O colonoscopy 2017    S/P cholecystectomy. 64 y/o pt here for egd/w mapping.    PAST SURGICAL HISTORY:  H/O colonoscopy 2017    S/P cholecystectomy.

## 2023-12-14 NOTE — ASU DISCHARGE PLAN (ADULT/PEDIATRIC) - NS MD DC FALL RISK RISK
For information on Fall & Injury Prevention, visit: [email or web address] OR  [email or web address] OR  https://www.cdc.gov/steadi/patient.html For information on Fall & Injury Prevention, visit: [email or web address] OR  [email or web address] OR  https://www.cdc.gov/steadi/patient.html

## 2023-12-14 NOTE — ASU DISCHARGE PLAN (ADULT/PEDIATRIC) - ASU DC SPECIAL INSTRUCTIONSFT
- Follow up with our [facility] located at [address] [place] [address]. Phone Number: [phone] - Follow up with our [facility] located at [address] [place] [address]. Phone Number: [phone]

## 2023-12-19 DIAGNOSIS — K31.A0 GASTRIC INTESTINAL METAPLASIA, UNSPECIFIED: ICD-10-CM

## 2023-12-19 DIAGNOSIS — Z88.0 ALLERGY STATUS TO PENICILLIN: ICD-10-CM

## 2023-12-19 DIAGNOSIS — K29.50 UNSPECIFIED CHRONIC GASTRITIS WITHOUT BLEEDING: ICD-10-CM

## 2023-12-19 DIAGNOSIS — K44.9 DIAPHRAGMATIC HERNIA WITHOUT OBSTRUCTION OR GANGRENE: ICD-10-CM

## 2023-12-19 DIAGNOSIS — Z90.49 ACQUIRED ABSENCE OF OTHER SPECIFIED PARTS OF DIGESTIVE TRACT: ICD-10-CM

## 2023-12-29 ENCOUNTER — APPOINTMENT (OUTPATIENT)
Dept: OBGYN | Facility: CLINIC | Age: 63
End: 2023-12-29
Payer: COMMERCIAL

## 2023-12-29 ENCOUNTER — OUTPATIENT (OUTPATIENT)
Dept: OUTPATIENT SERVICES | Facility: HOSPITAL | Age: 63
LOS: 1 days | End: 2023-12-29
Payer: COMMERCIAL

## 2023-12-29 ENCOUNTER — LABORATORY RESULT (OUTPATIENT)
Age: 63
End: 2023-12-29

## 2023-12-29 ENCOUNTER — APPOINTMENT (OUTPATIENT)
Dept: OBGYN | Facility: CLINIC | Age: 63
End: 2023-12-29

## 2023-12-29 VITALS
SYSTOLIC BLOOD PRESSURE: 134 MMHG | OXYGEN SATURATION: 100 % | HEART RATE: 90 BPM | WEIGHT: 161.5 LBS | DIASTOLIC BLOOD PRESSURE: 81 MMHG | RESPIRATION RATE: 20 BRPM | BODY MASS INDEX: 32.56 KG/M2 | TEMPERATURE: 98 F | HEIGHT: 59 IN

## 2023-12-29 DIAGNOSIS — N90.89 OTHER SPECIFIED NONINFLAMMATORY DISORDERS OF VULVA AND PERINEUM: ICD-10-CM

## 2023-12-29 DIAGNOSIS — Z01.419 ENCOUNTER FOR GYNECOLOGICAL EXAMINATION (GENERAL) (ROUTINE) WITHOUT ABNORMAL FINDINGS: ICD-10-CM

## 2023-12-29 DIAGNOSIS — Z98.890 OTHER SPECIFIED POSTPROCEDURAL STATES: Chronic | ICD-10-CM

## 2023-12-29 DIAGNOSIS — Z01.419 ENCOUNTER FOR GYNECOLOGICAL EXAMINATION (GENERAL) (ROUTINE) W/OUT ABNORMAL FINDINGS: ICD-10-CM

## 2023-12-29 DIAGNOSIS — Z90.49 ACQUIRED ABSENCE OF OTHER SPECIFIED PARTS OF DIGESTIVE TRACT: Chronic | ICD-10-CM

## 2023-12-29 PROCEDURE — 88360 TUMOR IMMUNOHISTOCHEM/MANUAL: CPT

## 2023-12-29 PROCEDURE — 87591 N.GONORRHOEAE DNA AMP PROB: CPT

## 2023-12-29 PROCEDURE — 99386 PREV VISIT NEW AGE 40-64: CPT | Mod: 25

## 2023-12-29 PROCEDURE — 88142 CYTOPATH C/V THIN LAYER: CPT

## 2023-12-29 PROCEDURE — 56605 BIOPSY OF VULVA/PERINEUM: CPT

## 2023-12-29 PROCEDURE — 87491 CHLMYD TRACH DNA AMP PROBE: CPT

## 2023-12-29 PROCEDURE — 81513 NFCT DS BV RNA VAG FLU ALG: CPT

## 2023-12-29 PROCEDURE — T1013: CPT

## 2023-12-29 PROCEDURE — 88305 TISSUE EXAM BY PATHOLOGIST: CPT

## 2023-12-29 PROCEDURE — 87624 HPV HI-RISK TYP POOLED RSLT: CPT

## 2023-12-29 PROCEDURE — 99396 PREV VISIT EST AGE 40-64: CPT | Mod: 25

## 2023-12-29 PROCEDURE — 87661 TRICHOMONAS VAGINALIS AMPLIF: CPT

## 2023-12-29 PROCEDURE — 87481 CANDIDA DNA AMP PROBE: CPT

## 2023-12-29 PROCEDURE — 88312 SPECIAL STAINS GROUP 1: CPT

## 2023-12-29 NOTE — HISTORY OF PRESENT ILLNESS
[Currently In Menopause] : currently in menopause [No] : Patient does not have concerns regarding sex [FreeTextEntry1] : 62yo  postmenopausal since age 40 presenting for annual visit. Reports bump on outisde of vagina since start of COVID which she felt recently grew in size. It is not painful or itchy. No other complaints. Denies fever, chills, CP, SOB, N/V, abdominal pain, abnormal vaginal bleeding/discharge, or urinary symptoms.   Not sexually active. Occasional leakage with laughing, coughing, sneezing. Not bothersome  Last pap: prior to COVID, normal Last mammogram: 2023 BIRADs-1 Last colonoscopy:   FH: paternal grandmother with uterine cancer in her 70s  Obhx:  NSVDx1, SABx3 Gynhx: Denies h/o fibroids, cysts, abnormal paps, or STIs

## 2023-12-29 NOTE — PROCEDURE
[Vulvar Biopsy] : Vulvar Biopsy [Time out performed] : Pre-procedure time out performed.  Patient's name, date of birth and procedure confirmed. [Consent Obtained] : Consent obtained [] : in the Perineum [Size of Biopsy Taken: ___ (mm)] : [unfilled]Umm [Local Anesthesia] : local anesthesia [____ Lidocaine w/o Epi] : ~VmL lidocaine without epinephrine [Sent to Pathology] : placed in buffered formalin and sent for pathology [Punch] : punch biopsy [Sutures #___] : [unfilled] sutures [Pressure] : the application of direct pressure [Tolerated Well] : the patient tolerated the procedure well [No Complications] : there were no complications

## 2023-12-29 NOTE — PLAN
[FreeTextEntry1] : 64yo postmenopausal P1 with vulvar lesion, for annual visit  #HCM -mammo and colonoscopy up to date -pap performed  #vulvar lesion -f/u biopsy results  RTC 2 weeks for results

## 2024-01-02 ENCOUNTER — OUTPATIENT (OUTPATIENT)
Dept: OUTPATIENT SERVICES | Facility: HOSPITAL | Age: 64
LOS: 1 days | End: 2024-01-02

## 2024-01-02 DIAGNOSIS — Z01.419 ENCOUNTER FOR GYNECOLOGICAL EXAMINATION (GENERAL) (ROUTINE) WITHOUT ABNORMAL FINDINGS: ICD-10-CM

## 2024-01-02 DIAGNOSIS — Z90.49 ACQUIRED ABSENCE OF OTHER SPECIFIED PARTS OF DIGESTIVE TRACT: Chronic | ICD-10-CM

## 2024-01-02 DIAGNOSIS — Z98.890 OTHER SPECIFIED POSTPROCEDURAL STATES: Chronic | ICD-10-CM

## 2024-01-03 DIAGNOSIS — Z01.419 ENCOUNTER FOR GYNECOLOGICAL EXAMINATION (GENERAL) (ROUTINE) WITHOUT ABNORMAL FINDINGS: ICD-10-CM

## 2024-01-03 LAB — HPV HIGH+LOW RISK DNA PNL CVX: NOT DETECTED

## 2024-01-08 LAB — CYTOLOGY CVX/VAG DOC THIN PREP: NORMAL

## 2024-01-11 ENCOUNTER — APPOINTMENT (OUTPATIENT)
Dept: OBGYN | Facility: CLINIC | Age: 64
End: 2024-01-11
Payer: COMMERCIAL

## 2024-01-11 ENCOUNTER — OUTPATIENT (OUTPATIENT)
Dept: OUTPATIENT SERVICES | Facility: HOSPITAL | Age: 64
LOS: 1 days | End: 2024-01-11
Payer: COMMERCIAL

## 2024-01-11 VITALS — SYSTOLIC BLOOD PRESSURE: 140 MMHG | DIASTOLIC BLOOD PRESSURE: 72 MMHG | BODY MASS INDEX: 32.32 KG/M2 | WEIGHT: 160 LBS

## 2024-01-11 DIAGNOSIS — Z90.49 ACQUIRED ABSENCE OF OTHER SPECIFIED PARTS OF DIGESTIVE TRACT: Chronic | ICD-10-CM

## 2024-01-11 DIAGNOSIS — Z00.00 ENCOUNTER FOR GENERAL ADULT MEDICAL EXAMINATION WITHOUT ABNORMAL FINDINGS: ICD-10-CM

## 2024-01-11 DIAGNOSIS — Z98.890 OTHER SPECIFIED POSTPROCEDURAL STATES: Chronic | ICD-10-CM

## 2024-01-11 PROCEDURE — 99212 OFFICE O/P EST SF 10 MIN: CPT

## 2024-01-11 PROCEDURE — T1013: CPT

## 2024-01-18 NOTE — DISCUSSION/SUMMARY
[FreeTextEntry1] : 62 yo P1, with vulvar lesion s/p biopsy -Final results not available today, biopsy sent out for specialist referral -sutures removed from area, biopsy site still healing, no signs of infection at this time -RTC 2 weeks for reevaluation of biopsy site/healing and results

## 2024-01-18 NOTE — PHYSICAL EXAM
[Chaperone Present] : A chaperone was present in the examining room during all aspects of the physical examination [Single ___cm] : a single [unfilled] ~Ucm [Brown] : brown [FreeTextEntry1] : 3cm lesion visualized with biopsy site located. Vicryl suture noted. No erythema, edema or pus.

## 2024-01-19 DIAGNOSIS — N90.89 OTHER SPECIFIED NONINFLAMMATORY DISORDERS OF VULVA AND PERINEUM: ICD-10-CM

## 2024-01-25 ENCOUNTER — APPOINTMENT (OUTPATIENT)
Dept: OBGYN | Facility: CLINIC | Age: 64
End: 2024-01-25
Payer: COMMERCIAL

## 2024-01-25 ENCOUNTER — OUTPATIENT (OUTPATIENT)
Dept: OUTPATIENT SERVICES | Facility: HOSPITAL | Age: 64
LOS: 1 days | End: 2024-01-25
Payer: COMMERCIAL

## 2024-01-25 VITALS
HEIGHT: 59 IN | SYSTOLIC BLOOD PRESSURE: 120 MMHG | WEIGHT: 164 LBS | BODY MASS INDEX: 33.06 KG/M2 | DIASTOLIC BLOOD PRESSURE: 70 MMHG

## 2024-01-25 DIAGNOSIS — Z71.2 PERSON CONSULTING FOR EXPLANATION OF EXAMINATION OR TEST FINDINGS: ICD-10-CM

## 2024-01-25 DIAGNOSIS — Z98.890 OTHER SPECIFIED POSTPROCEDURAL STATES: Chronic | ICD-10-CM

## 2024-01-25 DIAGNOSIS — Z90.49 ACQUIRED ABSENCE OF OTHER SPECIFIED PARTS OF DIGESTIVE TRACT: Chronic | ICD-10-CM

## 2024-01-25 PROCEDURE — 99212 OFFICE O/P EST SF 10 MIN: CPT

## 2024-01-25 PROCEDURE — T1013: CPT

## 2024-01-26 LAB — CORE LAB BIOPSY: NORMAL

## 2024-01-26 NOTE — HISTORY OF PRESENT ILLNESS
[FreeTextEntry1] : 64yo P1 postmenopausal presents for follow up on vulvar biopsy results. Was done 1 month ago, came 2 weeks ago for results but had been a sent out to dermatology and not back yet. Today, results are still not available. Patient reports she no longer has irritation by the biopsy site, no concerns today.

## 2024-01-26 NOTE — DISCUSSION/SUMMARY
[FreeTextEntry1] : 41yo P4 postmenopausal s/p vulvar biopsy Dec 2023. Results still not yet available today, send out to dermatology. Will schedule tele-health visit in 1 month to discuss results and/or call patient beforehand if the results are available.

## 2024-01-29 DIAGNOSIS — N90.89 OTHER SPECIFIED NONINFLAMMATORY DISORDERS OF VULVA AND PERINEUM: ICD-10-CM

## 2024-02-29 ENCOUNTER — OUTPATIENT (OUTPATIENT)
Dept: OUTPATIENT SERVICES | Facility: HOSPITAL | Age: 64
LOS: 1 days | End: 2024-02-29
Payer: COMMERCIAL

## 2024-02-29 ENCOUNTER — APPOINTMENT (OUTPATIENT)
Dept: OBGYN | Facility: CLINIC | Age: 64
End: 2024-02-29
Payer: COMMERCIAL

## 2024-02-29 VITALS
SYSTOLIC BLOOD PRESSURE: 118 MMHG | DIASTOLIC BLOOD PRESSURE: 77 MMHG | WEIGHT: 166 LBS | BODY MASS INDEX: 33.47 KG/M2 | HEIGHT: 59 IN

## 2024-02-29 DIAGNOSIS — A63.0 ANOGENITAL (VENEREAL) WARTS: ICD-10-CM

## 2024-02-29 DIAGNOSIS — Z00.00 ENCOUNTER FOR GENERAL ADULT MEDICAL EXAMINATION WITHOUT ABNORMAL FINDINGS: ICD-10-CM

## 2024-02-29 DIAGNOSIS — Z98.890 OTHER SPECIFIED POSTPROCEDURAL STATES: Chronic | ICD-10-CM

## 2024-02-29 DIAGNOSIS — Z90.49 ACQUIRED ABSENCE OF OTHER SPECIFIED PARTS OF DIGESTIVE TRACT: Chronic | ICD-10-CM

## 2024-02-29 PROCEDURE — 99212 OFFICE O/P EST SF 10 MIN: CPT

## 2024-02-29 PROCEDURE — T1013: CPT

## 2024-02-29 PROCEDURE — 99212 OFFICE O/P EST SF 10 MIN: CPT | Mod: GC

## 2024-02-29 NOTE — HISTORY OF PRESENT ILLNESS
[FreeTextEntry1] : 64yo  postmenopausal since age 40 presenting for biopsy results of vulvar lesion. Reported bump on outside of vagina since start of COVID which she felt had grown in size. Biopsy was performed which revealed condyloma acuminata with low risk HPV 6,11. Reports it does not bother her at this time.

## 2024-02-29 NOTE — PLAN
[FreeTextEntry1] : 62yo postmenopausal P1 with vulvar condyloma acuminata, low risk HPV 6/11, desiring expectant management.  -declined treatment with TCA  -continue to monitor with yearly exams -RTC 1 year for annual visit

## 2024-11-09 ENCOUNTER — INPATIENT (INPATIENT)
Facility: HOSPITAL | Age: 64
LOS: 11 days | Discharge: HOME | DRG: 40 | End: 2024-11-21
Attending: PSYCHIATRY & NEUROLOGY | Admitting: STUDENT IN AN ORGANIZED HEALTH CARE EDUCATION/TRAINING PROGRAM
Payer: SELF-PAY

## 2024-11-09 VITALS
HEART RATE: 63 BPM | DIASTOLIC BLOOD PRESSURE: 69 MMHG | HEIGHT: 59 IN | RESPIRATION RATE: 18 BRPM | OXYGEN SATURATION: 96 % | SYSTOLIC BLOOD PRESSURE: 110 MMHG | WEIGHT: 160.06 LBS | TEMPERATURE: 98 F

## 2024-11-09 DIAGNOSIS — Z90.49 ACQUIRED ABSENCE OF OTHER SPECIFIED PARTS OF DIGESTIVE TRACT: Chronic | ICD-10-CM

## 2024-11-09 DIAGNOSIS — Z98.890 OTHER SPECIFIED POSTPROCEDURAL STATES: Chronic | ICD-10-CM

## 2024-11-09 DIAGNOSIS — R10.9 UNSPECIFIED ABDOMINAL PAIN: ICD-10-CM

## 2024-11-09 LAB
ALBUMIN SERPL ELPH-MCNC: 3.9 G/DL — SIGNIFICANT CHANGE UP (ref 3.5–5.2)
ALBUMIN SERPL ELPH-MCNC: 4.4 G/DL — SIGNIFICANT CHANGE UP (ref 3.5–5.2)
ALP SERPL-CCNC: 100 U/L — SIGNIFICANT CHANGE UP (ref 30–115)
ALP SERPL-CCNC: 89 U/L — SIGNIFICANT CHANGE UP (ref 30–115)
ALT FLD-CCNC: 24 U/L — SIGNIFICANT CHANGE UP (ref 0–41)
ALT FLD-CCNC: 28 U/L — SIGNIFICANT CHANGE UP (ref 0–41)
ANION GAP SERPL CALC-SCNC: 10 MMOL/L — SIGNIFICANT CHANGE UP (ref 7–14)
ANION GAP SERPL CALC-SCNC: 11 MMOL/L — SIGNIFICANT CHANGE UP (ref 7–14)
AST SERPL-CCNC: 37 U/L — SIGNIFICANT CHANGE UP (ref 0–41)
AST SERPL-CCNC: 58 U/L — HIGH (ref 0–41)
BASE EXCESS BLDV CALC-SCNC: -3.5 MMOL/L — LOW (ref -2–3)
BASOPHILS # BLD AUTO: 0.01 K/UL — SIGNIFICANT CHANGE UP (ref 0–0.2)
BASOPHILS NFR BLD AUTO: 0.1 % — SIGNIFICANT CHANGE UP (ref 0–1)
BILIRUB SERPL-MCNC: 0.4 MG/DL — SIGNIFICANT CHANGE UP (ref 0.2–1.2)
BILIRUB SERPL-MCNC: 0.4 MG/DL — SIGNIFICANT CHANGE UP (ref 0.2–1.2)
BUN SERPL-MCNC: 12 MG/DL — SIGNIFICANT CHANGE UP (ref 10–20)
BUN SERPL-MCNC: 9 MG/DL — LOW (ref 10–20)
CA-I SERPL-SCNC: 1.05 MMOL/L — LOW (ref 1.15–1.33)
CALCIUM SERPL-MCNC: 7.5 MG/DL — LOW (ref 8.4–10.5)
CALCIUM SERPL-MCNC: 8.9 MG/DL — SIGNIFICANT CHANGE UP (ref 8.4–10.5)
CHLORIDE SERPL-SCNC: 104 MMOL/L — SIGNIFICANT CHANGE UP (ref 98–110)
CHLORIDE SERPL-SCNC: 105 MMOL/L — SIGNIFICANT CHANGE UP (ref 98–110)
CO2 SERPL-SCNC: 20 MMOL/L — SIGNIFICANT CHANGE UP (ref 17–32)
CO2 SERPL-SCNC: 23 MMOL/L — SIGNIFICANT CHANGE UP (ref 17–32)
CREAT SERPL-MCNC: 0.5 MG/DL — LOW (ref 0.7–1.5)
CREAT SERPL-MCNC: 0.7 MG/DL — SIGNIFICANT CHANGE UP (ref 0.7–1.5)
EGFR: 104 ML/MIN/1.73M2 — SIGNIFICANT CHANGE UP
EGFR: 96 ML/MIN/1.73M2 — SIGNIFICANT CHANGE UP
EOSINOPHIL # BLD AUTO: 0 K/UL — SIGNIFICANT CHANGE UP (ref 0–0.7)
EOSINOPHIL NFR BLD AUTO: 0 % — SIGNIFICANT CHANGE UP (ref 0–8)
FLUAV AG NPH QL: SIGNIFICANT CHANGE UP
FLUBV AG NPH QL: SIGNIFICANT CHANGE UP
GAS PNL BLDV: 133 MMOL/L — LOW (ref 136–145)
GAS PNL BLDV: SIGNIFICANT CHANGE UP
GAS PNL BLDV: SIGNIFICANT CHANGE UP
GLUCOSE SERPL-MCNC: 117 MG/DL — HIGH (ref 70–99)
GLUCOSE SERPL-MCNC: 138 MG/DL — HIGH (ref 70–99)
HCO3 BLDV-SCNC: 23 MMOL/L — SIGNIFICANT CHANGE UP (ref 22–29)
HCT VFR BLD CALC: 45.7 % — SIGNIFICANT CHANGE UP (ref 37–47)
HCT VFR BLDA CALC: 41 % — SIGNIFICANT CHANGE UP (ref 34.5–46.5)
HGB BLD CALC-MCNC: 13.7 G/DL — SIGNIFICANT CHANGE UP (ref 11.7–16.1)
HGB BLD-MCNC: 15 G/DL — SIGNIFICANT CHANGE UP (ref 12–16)
IMM GRANULOCYTES NFR BLD AUTO: 0.5 % — HIGH (ref 0.1–0.3)
LACTATE BLDV-MCNC: 1.3 MMOL/L — SIGNIFICANT CHANGE UP (ref 0.5–2)
LACTATE SERPL-SCNC: 1.3 MMOL/L — SIGNIFICANT CHANGE UP (ref 0.7–2)
LIDOCAIN IGE QN: 19 U/L — SIGNIFICANT CHANGE UP (ref 7–60)
LYMPHOCYTES # BLD AUTO: 0.81 K/UL — LOW (ref 1.2–3.4)
LYMPHOCYTES # BLD AUTO: 9.2 % — LOW (ref 20.5–51.1)
MCHC RBC-ENTMCNC: 27.5 PG — SIGNIFICANT CHANGE UP (ref 27–31)
MCHC RBC-ENTMCNC: 32.8 G/DL — SIGNIFICANT CHANGE UP (ref 32–37)
MCV RBC AUTO: 83.9 FL — SIGNIFICANT CHANGE UP (ref 81–99)
MONOCYTES # BLD AUTO: 0.2 K/UL — SIGNIFICANT CHANGE UP (ref 0.1–0.6)
MONOCYTES NFR BLD AUTO: 2.3 % — SIGNIFICANT CHANGE UP (ref 1.7–9.3)
NEUTROPHILS # BLD AUTO: 7.79 K/UL — HIGH (ref 1.4–6.5)
NEUTROPHILS NFR BLD AUTO: 87.9 % — HIGH (ref 42.2–75.2)
NRBC # BLD: 0 /100 WBCS — SIGNIFICANT CHANGE UP (ref 0–0)
PCO2 BLDV: 44 MMHG — HIGH (ref 39–42)
PH BLDV: 7.32 — SIGNIFICANT CHANGE UP (ref 7.32–7.43)
PLATELET # BLD AUTO: 204 K/UL — SIGNIFICANT CHANGE UP (ref 130–400)
PMV BLD: 10.4 FL — SIGNIFICANT CHANGE UP (ref 7.4–10.4)
PO2 BLDV: 31 MMHG — SIGNIFICANT CHANGE UP (ref 25–45)
POTASSIUM BLDV-SCNC: 4.6 MMOL/L — SIGNIFICANT CHANGE UP (ref 3.5–5.1)
POTASSIUM SERPL-MCNC: 4.9 MMOL/L — SIGNIFICANT CHANGE UP (ref 3.5–5)
POTASSIUM SERPL-MCNC: SIGNIFICANT CHANGE UP MMOL/L (ref 3.5–5)
POTASSIUM SERPL-SCNC: 4.9 MMOL/L — SIGNIFICANT CHANGE UP (ref 3.5–5)
POTASSIUM SERPL-SCNC: SIGNIFICANT CHANGE UP MMOL/L (ref 3.5–5)
PROT SERPL-MCNC: 6.3 G/DL — SIGNIFICANT CHANGE UP (ref 6–8)
PROT SERPL-MCNC: 7.5 G/DL — SIGNIFICANT CHANGE UP (ref 6–8)
RBC # BLD: 5.45 M/UL — HIGH (ref 4.2–5.4)
RBC # FLD: 12.8 % — SIGNIFICANT CHANGE UP (ref 11.5–14.5)
RSV RNA NPH QL NAA+NON-PROBE: SIGNIFICANT CHANGE UP
SAO2 % BLDV: 62.3 % — LOW (ref 67–88)
SARS-COV-2 RNA SPEC QL NAA+PROBE: SIGNIFICANT CHANGE UP
SODIUM SERPL-SCNC: 135 MMOL/L — SIGNIFICANT CHANGE UP (ref 135–146)
SODIUM SERPL-SCNC: 138 MMOL/L — SIGNIFICANT CHANGE UP (ref 135–146)
TROPONIN T, HIGH SENSITIVITY RESULT: <6 NG/L — SIGNIFICANT CHANGE UP (ref 6–13)
WBC # BLD: 8.85 K/UL — SIGNIFICANT CHANGE UP (ref 4.8–10.8)
WBC # FLD AUTO: 8.85 K/UL — SIGNIFICANT CHANGE UP (ref 4.8–10.8)

## 2024-11-09 PROCEDURE — 84100 ASSAY OF PHOSPHORUS: CPT

## 2024-11-09 PROCEDURE — 97112 NEUROMUSCULAR REEDUCATION: CPT | Mod: GO

## 2024-11-09 PROCEDURE — 71045 X-RAY EXAM CHEST 1 VIEW: CPT

## 2024-11-09 PROCEDURE — A9579: CPT

## 2024-11-09 PROCEDURE — 93970 EXTREMITY STUDY: CPT

## 2024-11-09 PROCEDURE — 83735 ASSAY OF MAGNESIUM: CPT

## 2024-11-09 PROCEDURE — 85025 COMPLETE CBC W/AUTO DIFF WBC: CPT

## 2024-11-09 PROCEDURE — 80074 ACUTE HEPATITIS PANEL: CPT

## 2024-11-09 PROCEDURE — 97116 GAIT TRAINING THERAPY: CPT | Mod: GP

## 2024-11-09 PROCEDURE — 92526 ORAL FUNCTION THERAPY: CPT | Mod: GN

## 2024-11-09 PROCEDURE — 80048 BASIC METABOLIC PNL TOTAL CA: CPT

## 2024-11-09 PROCEDURE — 0042T: CPT | Mod: MC

## 2024-11-09 PROCEDURE — 97535 SELF CARE MNGMENT TRAINING: CPT | Mod: GO

## 2024-11-09 PROCEDURE — 92507 TX SP LANG VOICE COMM INDIV: CPT | Mod: GN

## 2024-11-09 PROCEDURE — 80061 LIPID PANEL: CPT

## 2024-11-09 PROCEDURE — 97110 THERAPEUTIC EXERCISES: CPT | Mod: GP

## 2024-11-09 PROCEDURE — 80053 COMPREHEN METABOLIC PANEL: CPT

## 2024-11-09 PROCEDURE — 85576 BLOOD PLATELET AGGREGATION: CPT

## 2024-11-09 PROCEDURE — 80076 HEPATIC FUNCTION PANEL: CPT

## 2024-11-09 PROCEDURE — 92523 SPEECH SOUND LANG COMPREHEN: CPT | Mod: GN

## 2024-11-09 PROCEDURE — 70549 MR ANGIOGRAPH NECK W/O&W/DYE: CPT

## 2024-11-09 PROCEDURE — 85730 THROMBOPLASTIN TIME PARTIAL: CPT

## 2024-11-09 PROCEDURE — 93312 ECHO TRANSESOPHAGEAL: CPT

## 2024-11-09 PROCEDURE — 70551 MRI BRAIN STEM W/O DYE: CPT | Mod: MC

## 2024-11-09 PROCEDURE — 84484 ASSAY OF TROPONIN QUANT: CPT

## 2024-11-09 PROCEDURE — 84443 ASSAY THYROID STIM HORMONE: CPT

## 2024-11-09 PROCEDURE — 93307 TTE W/O DOPPLER COMPLETE: CPT

## 2024-11-09 PROCEDURE — 97167 OT EVAL HIGH COMPLEX 60 MIN: CPT | Mod: GO

## 2024-11-09 PROCEDURE — 83690 ASSAY OF LIPASE: CPT

## 2024-11-09 PROCEDURE — 93010 ELECTROCARDIOGRAM REPORT: CPT

## 2024-11-09 PROCEDURE — 70498 CT ANGIOGRAPHY NECK: CPT | Mod: 26,MC

## 2024-11-09 PROCEDURE — 92610 EVALUATE SWALLOWING FUNCTION: CPT | Mod: GN

## 2024-11-09 PROCEDURE — 97530 THERAPEUTIC ACTIVITIES: CPT | Mod: GO

## 2024-11-09 PROCEDURE — 83036 HEMOGLOBIN GLYCOSYLATED A1C: CPT

## 2024-11-09 PROCEDURE — C1764: CPT

## 2024-11-09 PROCEDURE — 87389 HIV-1 AG W/HIV-1&-2 AB AG IA: CPT

## 2024-11-09 PROCEDURE — 99285 EMERGENCY DEPT VISIT HI MDM: CPT

## 2024-11-09 PROCEDURE — 71045 X-RAY EXAM CHEST 1 VIEW: CPT | Mod: 26

## 2024-11-09 PROCEDURE — 97162 PT EVAL MOD COMPLEX 30 MIN: CPT | Mod: GP

## 2024-11-09 PROCEDURE — 36415 COLL VENOUS BLD VENIPUNCTURE: CPT

## 2024-11-09 PROCEDURE — 33285 INSJ SUBQ CAR RHYTHM MNTR: CPT

## 2024-11-09 PROCEDURE — 93320 DOPPLER ECHO COMPLETE: CPT

## 2024-11-09 PROCEDURE — 70450 CT HEAD/BRAIN W/O DYE: CPT | Mod: 26,MC

## 2024-11-09 PROCEDURE — 83930 ASSAY OF BLOOD OSMOLALITY: CPT

## 2024-11-09 PROCEDURE — 93325 DOPPLER ECHO COLOR FLOW MAPG: CPT

## 2024-11-09 PROCEDURE — 70496 CT ANGIOGRAPHY HEAD: CPT | Mod: 26,MC

## 2024-11-09 PROCEDURE — 70450 CT HEAD/BRAIN W/O DYE: CPT | Mod: MC

## 2024-11-09 PROCEDURE — 85610 PROTHROMBIN TIME: CPT

## 2024-11-09 RX ORDER — SODIUM CHLORIDE 9 MG/ML
1000 INJECTION, SOLUTION INTRAMUSCULAR; INTRAVENOUS; SUBCUTANEOUS ONCE
Refills: 0 | Status: COMPLETED | OUTPATIENT
Start: 2024-11-09 | End: 2024-11-09

## 2024-11-09 RX ORDER — CLOPIDOGREL 75 MG/1
300 TABLET, FILM COATED ORAL ONCE
Refills: 0 | Status: DISCONTINUED | OUTPATIENT
Start: 2024-11-09 | End: 2024-11-09

## 2024-11-09 RX ORDER — ONDANSETRON HYDROCHLORIDE 4 MG/1
4 TABLET, FILM COATED ORAL ONCE
Refills: 0 | Status: COMPLETED | OUTPATIENT
Start: 2024-11-09 | End: 2024-11-09

## 2024-11-09 RX ORDER — SODIUM CHLORIDE 9 MG/ML
500 INJECTION, SOLUTION INTRAMUSCULAR; INTRAVENOUS; SUBCUTANEOUS ONCE
Refills: 0 | Status: DISCONTINUED | OUTPATIENT
Start: 2024-11-09 | End: 2024-11-09

## 2024-11-09 RX ORDER — FAMOTIDINE 20 MG/1
20 TABLET, FILM COATED ORAL ONCE
Refills: 0 | Status: COMPLETED | OUTPATIENT
Start: 2024-11-09 | End: 2024-11-09

## 2024-11-09 RX ORDER — ENOXAPARIN SODIUM 30 MG/.3ML
40 INJECTION SUBCUTANEOUS EVERY 24 HOURS
Refills: 0 | Status: DISCONTINUED | OUTPATIENT
Start: 2024-11-09 | End: 2024-11-21

## 2024-11-09 RX ORDER — ACETAMINOPHEN 500MG 500 MG/1
975 TABLET, COATED ORAL ONCE
Refills: 0 | Status: COMPLETED | OUTPATIENT
Start: 2024-11-09 | End: 2024-11-09

## 2024-11-09 RX ORDER — MECLIZINE HCL 12.5 MG
12.5 TABLET ORAL EVERY 6 HOURS
Refills: 0 | Status: DISCONTINUED | OUTPATIENT
Start: 2024-11-09 | End: 2024-11-16

## 2024-11-09 RX ORDER — ONDANSETRON HYDROCHLORIDE 4 MG/1
4 TABLET, FILM COATED ORAL THREE TIMES A DAY
Refills: 0 | Status: DISCONTINUED | OUTPATIENT
Start: 2024-11-09 | End: 2024-11-21

## 2024-11-09 RX ORDER — KETOROLAC TROMETHAMINE 30 MG/ML
15 INJECTION INTRAMUSCULAR; INTRAVENOUS ONCE
Refills: 0 | Status: DISCONTINUED | OUTPATIENT
Start: 2024-11-09 | End: 2024-11-09

## 2024-11-09 RX ORDER — METOCLOPRAMIDE HYDROCHLORIDE 10 MG/1
10 TABLET ORAL ONCE
Refills: 0 | Status: COMPLETED | OUTPATIENT
Start: 2024-11-09 | End: 2024-11-09

## 2024-11-09 RX ORDER — CLOPIDOGREL 75 MG/1
75 TABLET, FILM COATED ORAL DAILY
Refills: 0 | Status: DISCONTINUED | OUTPATIENT
Start: 2024-11-10 | End: 2024-11-10

## 2024-11-09 RX ORDER — MECLIZINE HCL 12.5 MG
50 TABLET ORAL ONCE
Refills: 0 | Status: COMPLETED | OUTPATIENT
Start: 2024-11-09 | End: 2024-11-09

## 2024-11-09 RX ORDER — CLOPIDOGREL 75 MG/1
300 TABLET, FILM COATED ORAL ONCE
Refills: 0 | Status: COMPLETED | OUTPATIENT
Start: 2024-11-09 | End: 2024-11-09

## 2024-11-09 RX ORDER — SODIUM CHLORIDE 9 MG/ML
1000 INJECTION, SOLUTION INTRAMUSCULAR; INTRAVENOUS; SUBCUTANEOUS
Refills: 0 | Status: DISCONTINUED | OUTPATIENT
Start: 2024-11-09 | End: 2024-11-10

## 2024-11-09 RX ADMIN — ONDANSETRON HYDROCHLORIDE 4 MILLIGRAM(S): 4 TABLET, FILM COATED ORAL at 18:08

## 2024-11-09 RX ADMIN — SODIUM CHLORIDE 60 MILLILITER(S): 9 INJECTION, SOLUTION INTRAMUSCULAR; INTRAVENOUS; SUBCUTANEOUS at 23:23

## 2024-11-09 RX ADMIN — Medication 325 MILLIGRAM(S): at 20:44

## 2024-11-09 RX ADMIN — METOCLOPRAMIDE HYDROCHLORIDE 104 MILLIGRAM(S): 10 TABLET ORAL at 16:28

## 2024-11-09 RX ADMIN — CLOPIDOGREL 300 MILLIGRAM(S): 75 TABLET, FILM COATED ORAL at 20:43

## 2024-11-09 RX ADMIN — ONDANSETRON HYDROCHLORIDE 4 MILLIGRAM(S): 4 TABLET, FILM COATED ORAL at 20:23

## 2024-11-09 RX ADMIN — SODIUM CHLORIDE 1000 MILLILITER(S): 9 INJECTION, SOLUTION INTRAMUSCULAR; INTRAVENOUS; SUBCUTANEOUS at 18:12

## 2024-11-09 RX ADMIN — Medication 50 MILLIGRAM(S): at 18:09

## 2024-11-09 RX ADMIN — SODIUM CHLORIDE 1000 MILLILITER(S): 9 INJECTION, SOLUTION INTRAMUSCULAR; INTRAVENOUS; SUBCUTANEOUS at 16:28

## 2024-11-09 RX ADMIN — FAMOTIDINE 20 MILLIGRAM(S): 20 TABLET, FILM COATED ORAL at 16:27

## 2024-11-09 RX ADMIN — Medication 25 GRAM(S): at 18:09

## 2024-11-09 RX ADMIN — Medication 80 MILLIGRAM(S): at 20:44

## 2024-11-09 RX ADMIN — KETOROLAC TROMETHAMINE 15 MILLIGRAM(S): 30 INJECTION INTRAMUSCULAR; INTRAVENOUS at 16:28

## 2024-11-09 RX ADMIN — ACETAMINOPHEN 500MG 975 MILLIGRAM(S): 500 TABLET, COATED ORAL at 18:09

## 2024-11-09 NOTE — ED ADULT NURSE NOTE - NSFALLHARMRISKINTERV_ED_ALL_ED
Assistance OOB with selected safe patient handling equipment if applicable/Assistance with ambulation/Communicate risk of Fall with Harm to all staff, patient, and family/Monitor gait and stability/Monitor for mental status changes and reorient to person, place, and time, as needed/Provide visual cue: red socks, yellow wristband, yellow gown, etc/Reinforce activity limits and safety measures with patient and family/Use of alarms - bed, stretcher, chair and/or video monitoring/Bed in lowest position, wheels locked, appropriate side rails in place/Call bell, personal items and telephone in reach/Instruct patient to call for assistance before getting out of bed/chair/stretcher/Non-slip footwear applied when patient is off stretcher/[place] to call system/Physically safe environment - no spills, clutter or unnecessary equipment/Purposeful Proactive Rounding/Room/bathroom lighting operational, light cord in reach

## 2024-11-09 NOTE — STROKE CODE NOTE - CT READ
09-Nov-2024 19:57 [Mother] : mother [Father] : father [College] : College [FreeTextEntry1] : graduating from college in May 2018

## 2024-11-09 NOTE — ED PROVIDER NOTE - CLINICAL SUMMARY MEDICAL DECISION MAKING FREE TEXT BOX
Patient presents for evaluation vertiginous dizziness.  Last known well at midnight.  CT showing acute stroke with vertebral artery occlusion.  Code stroke called and case discussed with neurology who recommends medical management and admission to stroke unit for further care.

## 2024-11-09 NOTE — ED PROVIDER NOTE - ATTENDING APP SHARED VISIT CONTRIBUTION OF CARE
64-year-old female, no reported past medical history, p/w cough, myalgia, sore throat and congestion x 1 week, and headache and vomiting today.  + Decreased p.o.  No chest pain, shortness of breath, urinary complaints or diarrhea. 64-year-old female, no reported past medical history, p/w cough, myalgia, sore throat and congestion x 1 week, and headache and vomiting and dizziness today.  + Decreased p.o.  Dizziness described as room spinning. No chest pain, shortness of breath, urinary complaints or diarrhea. No focal weakness or numbness    .Exam as noted above. Pt has some vertigo and nausea during assessment.    IMP: viral illness, vertigo- likely peripheral.  P: labs, ivf, antiemetic, meclizine, ekg, reassess.

## 2024-11-09 NOTE — ED PROVIDER NOTE - PROGRESS NOTE DETAILS
Pt's HA has completely resolved, but still has vertigo. Will redose medications and order CT imaging. [place]- Signout received by Dr. Lopez.  Patient presented for evaluation of vertiginous dizziness which began when she woke up (8 AM) with last known normal around midnight according to the patient.  Radiology had spoke to Dr. Bush regarding CT which was concerning for stroke, vertebral artery occlusion.  Code stroke was called, case discussed with neurology. Rpt neuro exam- normal str/sensation x4, R eye nystagmus, RUE w/ mild difficulty w/ FTN, clear speech, no facial droop, airway protected

## 2024-11-09 NOTE — H&P ADULT - NSHPPHYSICALEXAM_GEN_ALL_CORE
General: Well-developed, well nourished, in no acute distress.  Eyes: Conjunctiva and sclera clear.  Neurologic:  - Mental Status:  Alert, awake, oriented to person, place, and time; Speech is fluent with intact naming, repetition, and comprehension.  - Cranial Nerves II-XII:    II:  Visual fields are full to confrontation; Pupils are equal, round, and reactive to light; Visual acuity is 20/20 bilaterally; Fundoscopic exam is normal with sharp discs.  III, IV, VI:  Extraocular movements are intact without nystagmus.  V:  Facial sensation is intact in the V1-V3 distribution bilaterally.  VII:  Face is symmetric with normal eye closure and smile  VIII:  Hearing is intact to finger rub.  IX, X:  Uvula is midline and soft palate rises symmetrically  XI:  Head turning and shoulder shrug are intact.  XII:  Tongue protrudes in the midline.  - Motor:  Strength is 5/5 throughout.  There is no pronator drift.  Normal muscle bulk and tone throughout.  - Reflexes:  2+ and symmetric at the biceps, triceps, brachioradialis, knees, and ankles.  Plantar responses flexor.  - Sensory:  Intact throughout to vibration, and joint-position, light touch, pin prick.  - Coordination:  Finger-nose-finger and heel-knee-shin intact without dysmetria.  Rapid alternating hand movements intact.

## 2024-11-09 NOTE — H&P ADULT - NSHPLABSRESULTS_GEN_ALL_CORE
LABS:                         15.0   8.85  )-----------( 204      ( 09 Nov 2024 16:25 )             45.7     11-09    135  |  105  |  9[L]  ----------------------------<  117[H]  4.9   |  20  |  0.5[L]    Ca    7.5[L]      09 Nov 2024 20:37    TPro  6.3  /  Alb  3.9  /  TBili  0.4  /  DBili  x   /  AST  37  /  ALT  24  /  AlkPhos  89  11-09      Urinalysis Basic - ( 09 Nov 2024 20:37 )    Color: x / Appearance: x / SG: x / pH: x  Gluc: 117 mg/dL / Ketone: x  / Bili: x / Urobili: x   Blood: x / Protein: x / Nitrite: x   Leuk Esterase: x / RBC: x / WBC x   Sq Epi: x / Non Sq Epi: x / Bacteria: x            Lactate, Blood: 1.3 mmol/L (11-09 @ 16:25)      RADIOLOGY, EKG & ADDITIONAL TESTS:

## 2024-11-09 NOTE — ED PROVIDER NOTE - MDM ORDERS SUBMITTED SELECTION
Pt up and dressed and assisted to BR with crutches and WC. Denies c/o pain. Reviewed instructions with pt and , both verbalize understanding. Imaging Studies/Medications

## 2024-11-09 NOTE — H&P ADULT - HISTORY OF PRESENT ILLNESS
64-year-old female with PMHx of cholecystitis s/p lap roel, not on any medications, presents to ED for dizziness, nausea, and vomiting after waking up this morning.   64-year-old female with PMHx of cholecystitis s/p lap roel, not on any medications, presents to ED for dizziness, nausea, and vomiting after waking up this morning. Last know well was the night before at 11:30pm. Patient with several days of cough, body aches, headache, and generalized weakness. Patient states she feels more dizzy with movement with unsteady gait. When lying in bed talking to author, she feels less dizzy. However, she still feels nauseas. She has right sided tinnitus for six years but it is never associated with dizziness. Patient denies hx of HTN, DM, HLD. She used to smoke tobacco 1/3 pack per day for 40 years, quit 14 years ago. Patient at baseline is independent living on her own.

## 2024-11-09 NOTE — ED PROVIDER NOTE - PHYSICAL EXAMINATION
CONST: Well appearing in NAD  EYES: PERRL, EOMI, Sclera and conjunctiva clear.   ENT: Oropharynx normal appearing, no erythema or exudates. Uvula midline.  CARD: Normal S1 S2; Normal rate and rhythm  RESP: Equal BS B/L, No wheezes, rhonchi or rales. No distress  GI: Soft, non-tender, non-distended.  MS: Normal ROM in all extremities. No midline spinal tenderness.  SKIN: Warm, dry, no acute rashes.   NEURO: A&Ox3, No focal deficits. Strength 5/5 with no sensory deficits. Steady gait CONST: Well appearing in NAD  EYES: PERRL, EOMI, Sclera and conjunctiva clear.   ENT: Oropharynx normal appearing, no erythema or exudates. Uvula midline.  CARD: Normal S1 S2; Normal rate and rhythm  RESP: Equal BS B/L, No wheezes, rhonchi or rales. No distress  GI: Soft, non-tender, non-distended.  MS: Normal ROM in all extremities. No midline spinal tenderness.  SKIN: Warm, dry, no acute rashes.   NEURO: A&Ox3, CN wnl, no cerebellar signs, no direction changing nystagmus, no skew deviation, Strength 5/5 with no sensory deficits.

## 2024-11-09 NOTE — ED PROVIDER NOTE - CHILD ABUSE FACILITY
Latex:  Patient denies allergy to latex.  Medications reviewed with patient.  Tobacco use verified.  Allergies verified.    Primary Medical Doctor: Ke Alvarez MD   DATE OF INJURY/SURGERY:  DOS 11/10/2022  WORK RELATED: No  OCCUPATION: retired    Chief Complaint   Patient presents with   • Office Visit      Left CTR  DOS 11/10/22         Pain level 0-10: 0/10  Pain medication: None    New XR: No    Incision is well approximated: Yes  Signs of infection No;     Dressing removed/Sutures removed: dressing and sutures removing today      Pt reports no pain noted in L hand. L IF and MF remain numb.       [facility]

## 2024-11-09 NOTE — ED ADULT NURSE NOTE - NS ED NURSE LEVEL OF CONSCIOUSNESS SPEECH
29 y/o F with L wrist strain and possible wrist tendonitis. Plan: NSAIDs and velcro wrist brace until f/u with orthopedist.
Speaking Coherently

## 2024-11-09 NOTE — H&P ADULT - ASSESSMENT
64F PMHx of cholecystitis s/p lap roel, not on any medications, presents to ED for dizziness, nausea, and vomiting. LKW 11:30pm the night before. 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 Stroke code was called. NIHSS 0, still with dizziness and nausea. Patient out of window for TNK and deemed not a candidate for acute thrombectomy. Etiology likely SHEFALI vs ESUS. Patient will be admitted to stroke unit for monitoring and further workup.    #A left cerebellar infarct. Etiology SHEFALI vs ESUS  #left vertebral artery occlusion.   - f/u MRI noncon  - Allowing permissive HTN  - ASA 325mg load / Plavix 300mg load  - ASA 81 and Plavix 75mg qD  - Atorvastatin 80mg qHS  - f/u A1c, TSH, Lipid panel  - TTE w/ bubble  - may consider consult EP for ILR or MCOT  - Bedside dysphagia  - PT/OT/speech  - DVT ppx with SCDs and Lovenox    #Misc:  F: NI  E: replete K<4, Mg<2  N: pending dysphagia screen ->  regular   VTE Prophylaxis: SCDs, Lovenox  GI: NI  Activity: AAT  C: Full Code  D: Stroke Unit

## 2024-11-09 NOTE — ED PROVIDER NOTE - OBJECTIVE STATEMENT
64-year-old female reported past medical history presents to the ED for evaluation.  Patient with several days of cough, body aches, headache and generalized weakness today woke up with nausea and vomiting.  Unable to tolerate p.o. admits to sick contacts at work.  Denies any fevers, chills, chest pain, shortness of breath, diarrhea, dysuria.    Granddaughter at bedside providing [place]ish translation

## 2024-11-10 LAB
ALBUMIN SERPL ELPH-MCNC: 3.8 G/DL — SIGNIFICANT CHANGE UP (ref 3.5–5.2)
ALP SERPL-CCNC: 83 U/L — SIGNIFICANT CHANGE UP (ref 30–115)
ALT FLD-CCNC: 22 U/L — SIGNIFICANT CHANGE UP (ref 0–41)
ANION GAP SERPL CALC-SCNC: 10 MMOL/L — SIGNIFICANT CHANGE UP (ref 7–14)
ANION GAP SERPL CALC-SCNC: 11 MMOL/L — SIGNIFICANT CHANGE UP (ref 7–14)
ANION GAP SERPL CALC-SCNC: 12 MMOL/L — SIGNIFICANT CHANGE UP (ref 7–14)
AST SERPL-CCNC: 28 U/L — SIGNIFICANT CHANGE UP (ref 0–41)
BILIRUB DIRECT SERPL-MCNC: 0.2 MG/DL — SIGNIFICANT CHANGE UP (ref 0–0.3)
BILIRUB INDIRECT FLD-MCNC: 0.5 MG/DL — SIGNIFICANT CHANGE UP (ref 0.2–1.2)
BILIRUB SERPL-MCNC: 0.7 MG/DL — SIGNIFICANT CHANGE UP (ref 0.2–1.2)
BUN SERPL-MCNC: 11 MG/DL — SIGNIFICANT CHANGE UP (ref 10–20)
CALCIUM SERPL-MCNC: 7.8 MG/DL — LOW (ref 8.4–10.5)
CALCIUM SERPL-MCNC: 7.8 MG/DL — LOW (ref 8.4–10.5)
CALCIUM SERPL-MCNC: 8.1 MG/DL — LOW (ref 8.4–10.5)
CHLORIDE SERPL-SCNC: 109 MMOL/L — SIGNIFICANT CHANGE UP (ref 98–110)
CHLORIDE SERPL-SCNC: 112 MMOL/L — HIGH (ref 98–110)
CHLORIDE SERPL-SCNC: 113 MMOL/L — HIGH (ref 98–110)
CHOLEST SERPL-MCNC: 148 MG/DL — SIGNIFICANT CHANGE UP
CO2 SERPL-SCNC: 19 MMOL/L — SIGNIFICANT CHANGE UP (ref 17–32)
CO2 SERPL-SCNC: 20 MMOL/L — SIGNIFICANT CHANGE UP (ref 17–32)
CO2 SERPL-SCNC: 21 MMOL/L — SIGNIFICANT CHANGE UP (ref 17–32)
CREAT SERPL-MCNC: 0.6 MG/DL — LOW (ref 0.7–1.5)
EGFR: 100 ML/MIN/1.73M2 — SIGNIFICANT CHANGE UP
GLUCOSE SERPL-MCNC: 104 MG/DL — HIGH (ref 70–99)
GLUCOSE SERPL-MCNC: 108 MG/DL — HIGH (ref 70–99)
GLUCOSE SERPL-MCNC: 97 MG/DL — SIGNIFICANT CHANGE UP (ref 70–99)
HDLC SERPL-MCNC: 46 MG/DL — LOW
LIPID PNL WITH DIRECT LDL SERPL: 89 MG/DL — SIGNIFICANT CHANGE UP
NON HDL CHOLESTEROL: 102 MG/DL — SIGNIFICANT CHANGE UP
OSMOLALITY SERPL: 301 MOS/KG — SIGNIFICANT CHANGE UP (ref 280–301)
POTASSIUM SERPL-MCNC: 3.8 MMOL/L — SIGNIFICANT CHANGE UP (ref 3.5–5)
POTASSIUM SERPL-MCNC: 4 MMOL/L — SIGNIFICANT CHANGE UP (ref 3.5–5)
POTASSIUM SERPL-MCNC: 4.7 MMOL/L — SIGNIFICANT CHANGE UP (ref 3.5–5)
POTASSIUM SERPL-SCNC: 3.8 MMOL/L — SIGNIFICANT CHANGE UP (ref 3.5–5)
POTASSIUM SERPL-SCNC: 4 MMOL/L — SIGNIFICANT CHANGE UP (ref 3.5–5)
POTASSIUM SERPL-SCNC: 4.7 MMOL/L — SIGNIFICANT CHANGE UP (ref 3.5–5)
PROT SERPL-MCNC: 6.3 G/DL — SIGNIFICANT CHANGE UP (ref 6–8)
SODIUM SERPL-SCNC: 140 MMOL/L — SIGNIFICANT CHANGE UP (ref 135–146)
SODIUM SERPL-SCNC: 143 MMOL/L — SIGNIFICANT CHANGE UP (ref 135–146)
SODIUM SERPL-SCNC: 144 MMOL/L — SIGNIFICANT CHANGE UP (ref 135–146)
TRIGL SERPL-MCNC: 63 MG/DL — SIGNIFICANT CHANGE UP

## 2024-11-10 PROCEDURE — 36556 INSERT NON-TUNNEL CV CATH: CPT | Mod: GC

## 2024-11-10 PROCEDURE — 71045 X-RAY EXAM CHEST 1 VIEW: CPT | Mod: 26

## 2024-11-10 PROCEDURE — 70551 MRI BRAIN STEM W/O DYE: CPT | Mod: 26

## 2024-11-10 PROCEDURE — 99223 1ST HOSP IP/OBS HIGH 75: CPT | Mod: 25

## 2024-11-10 RX ORDER — MANNITOL 20 G/100ML
18 INJECTION, SOLUTION INTRAVENOUS EVERY 6 HOURS
Refills: 0 | Status: DISCONTINUED | OUTPATIENT
Start: 2024-11-10 | End: 2024-11-10

## 2024-11-10 RX ORDER — SODIUM CHLORIDE 3 G/100ML
500 INJECTION, SOLUTION INTRAVENOUS
Refills: 0 | Status: DISCONTINUED | OUTPATIENT
Start: 2024-11-10 | End: 2024-11-10

## 2024-11-10 RX ORDER — CHLORHEXIDINE GLUCONATE 1.2 MG/ML
1 RINSE ORAL
Refills: 0 | Status: DISCONTINUED | OUTPATIENT
Start: 2024-11-10 | End: 2024-11-21

## 2024-11-10 RX ORDER — POTASSIUM CHLORIDE 600 MG/1
20 TABLET, EXTENDED RELEASE ORAL ONCE
Refills: 0 | Status: DISCONTINUED | OUTPATIENT
Start: 2024-11-10 | End: 2024-11-11

## 2024-11-10 RX ORDER — MANNITOL 20 G/100ML
18 INJECTION, SOLUTION INTRAVENOUS EVERY 6 HOURS
Refills: 0 | Status: COMPLETED | OUTPATIENT
Start: 2024-11-10 | End: 2024-11-11

## 2024-11-10 RX ORDER — SODIUM CHLORIDE 3 G/100ML
500 INJECTION, SOLUTION INTRAVENOUS
Refills: 0 | Status: DISCONTINUED | OUTPATIENT
Start: 2024-11-10 | End: 2024-11-13

## 2024-11-10 RX ORDER — SODIUM CHLORIDE 3 G/100ML
250 INJECTION, SOLUTION INTRAVENOUS ONCE
Refills: 0 | Status: COMPLETED | OUTPATIENT
Start: 2024-11-10 | End: 2024-11-10

## 2024-11-10 RX ORDER — SODIUM CHLORIDE 3 G/100ML
100 INJECTION, SOLUTION INTRAVENOUS ONCE
Refills: 0 | Status: COMPLETED | OUTPATIENT
Start: 2024-11-10 | End: 2024-11-10

## 2024-11-10 RX ORDER — ACETAMINOPHEN 500MG 500 MG/1
1000 TABLET, COATED ORAL ONCE
Refills: 0 | Status: COMPLETED | OUTPATIENT
Start: 2024-11-10 | End: 2024-11-11

## 2024-11-10 RX ADMIN — KETOROLAC TROMETHAMINE 15 MILLIGRAM(S): 30 INJECTION INTRAMUSCULAR; INTRAVENOUS at 13:35

## 2024-11-10 RX ADMIN — SODIUM CHLORIDE 600 MILLILITER(S): 3 INJECTION, SOLUTION INTRAVENOUS at 18:40

## 2024-11-10 RX ADMIN — ENOXAPARIN SODIUM 40 MILLIGRAM(S): 30 INJECTION SUBCUTANEOUS at 05:44

## 2024-11-10 RX ADMIN — Medication 80 MILLIGRAM(S): at 21:41

## 2024-11-10 RX ADMIN — ONDANSETRON HYDROCHLORIDE 4 MILLIGRAM(S): 4 TABLET, FILM COATED ORAL at 05:42

## 2024-11-10 RX ADMIN — SODIUM CHLORIDE 750 MILLILITER(S): 3 INJECTION, SOLUTION INTRAVENOUS at 11:55

## 2024-11-10 RX ADMIN — MANNITOL 180 GRAM(S): 20 INJECTION, SOLUTION INTRAVENOUS at 21:41

## 2024-11-10 RX ADMIN — SODIUM CHLORIDE 60 MILLILITER(S): 9 INJECTION, SOLUTION INTRAMUSCULAR; INTRAVENOUS; SUBCUTANEOUS at 12:36

## 2024-11-10 RX ADMIN — CLOPIDOGREL 75 MILLIGRAM(S): 75 TABLET, FILM COATED ORAL at 11:14

## 2024-11-10 RX ADMIN — SODIUM CHLORIDE 30 MILLILITER(S): 3 INJECTION, SOLUTION INTRAVENOUS at 12:35

## 2024-11-10 RX ADMIN — ACETAMINOPHEN 500MG 975 MILLIGRAM(S): 500 TABLET, COATED ORAL at 13:35

## 2024-11-10 RX ADMIN — MANNITOL 180 GRAM(S): 20 INJECTION, SOLUTION INTRAVENOUS at 15:25

## 2024-11-10 RX ADMIN — SODIUM CHLORIDE 60 MILLILITER(S): 9 INJECTION, SOLUTION INTRAMUSCULAR; INTRAVENOUS; SUBCUTANEOUS at 12:13

## 2024-11-10 RX ADMIN — Medication 81 MILLIGRAM(S): at 11:14

## 2024-11-10 NOTE — PROGRESS NOTE ADULT - ASSESSMENT
64F. PMH: Hx of Cholecystitis s/p lap-roel, former smoker (13 pack years, quit 14 years ago). Presented 11/9 c/o dizziness nausea and vomiting since waking up from sleep. LKW 11/8 11:30PM Stroke code activated: NIHSS 0. CTH (+) for Acute infarct involving the left cerebellar hemisphere, CTP showed 5cc of penumbra in L hemisphere, with 4cc of core infarct. 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 No TNK OOW, No MT - due to low NIHSS. Admitted to stroke unit for further workup. 11/10 in AM, patient's MRI completed showing large cerebellar infarct with partial effacement of 4th ventricle and mass effect on the medulla and left cerebellar tonsillar herniation. On evaluation, patient was awake, attentive to voice though in mild distress from severe nausea. NIHSS now a 1 for dysmetria on LUE. NSICU consulted, hypertonic saline started. Patient upgraded to ICU for further monitoring. Etiology likely SHEFALI vs ESUS. Patient will be admitted to stroke unit for monitoring and further workup.    NEUROLOGY  #A left cerebellar infarct. Etiology SHEFALI vs ESUS  #left vertebral artery occlusion.   - Stroke Code CTH/CTP/CTA: Completed and reviewed  - S/p Load Aspirin 325mg and Plavix 300mg  - Neurochecks & Vitals q1 hrs  - Fall and Aspiration precautions  - Physiatry eval/Physical therapy/Occupational therapy/Speech and Swallow  - Neurosurgery Consult - SOC/Hydro watch   - F/u HbA1c, TSH and Lipid panel  - F/u MRI Brain Non-con  - MRA Dissection protocol   - F/u TTE w bubble study  - C/w Aspirin 81mg   - Hold Plavix  - hypertonic saline 3% IVF and mannitol IVPB; goal Na 145-155; serum osm 310-320, monitor I/Os, bmp q6  - C/w Atorvastatin 80mg daily  - C/w DVT PPX as below    CARDIOLOGY  #Hypertension  - Goal -220 for first 24H  - hold home blood pressure medication for now  - obtain TTE with bubble  - Stroke Code EKG Results: NSR    #HLD - LDL results: 89  - high dose statin as above    PULMONOLOGY  - call provider if SPO2 < 94%    GATROENTEROLOGY  - Diet: DASH    RENAL/ELECTROLYTES  - Replete K<4 and Mg <2    INFECTIOUS DISEASE  - Monitor     ENDOCRINE  - A1C results: Pending  - TSH results: Pending  - ISS if indicated    HEME/ONC  - DVT ppx: Lovenox, SCDs    Dispo  - NSICU  - Physiatry eval: (11/10) Possible 4A candidate   - Physical therapy Eval: Pending

## 2024-11-10 NOTE — PROGRESS NOTE ADULT - SUBJECTIVE AND OBJECTIVE BOX
Neurovascular Progress Note     MARY FUENTES 64y Female [id number]    Hospital Day: 1d     64F. PMH: Hx of Cholecystitis s/p lap-roel, former smoker (13 pack years, quit 14 years ago)  Presented 11/9 c/o dizziness nausea and vomiting since waking up from sleep. LKW 11/8 11:30PM. Symptoms preceded by several days of cough, body aches, generalized weakness.   Patient at baseline is independent living on her own.     Hospital Course:  Patient arrived at ED /69, breathing on room air, nontachycardic, EKG showing NSR. Stroke code activated: NIHSS 0. CTH (+) for Acute infarct involving the left cerebellar hemisphere, CTP showed 5cc of penumbra in L hemisphere, with 4cc of core infarct. 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 No TNK OOW, No MT - due to low NIHSS. Admitted to stroke unit for further workup. 11/10 in AM, patient's MRI completed showing large cerebellar infarct with partial effacement of 4th ventricle and mass effect on the medulla and left cerebellar tonsillar herniation. On evaluation, patient was awake, attentive to voice though in mild distress from severe nausea. NIHSS now a 1 for dysmetria on LUE. NSICU consulted, hypertonic saline started. Patient upgraded to ICU for further monitoring.     Vital Signs  T(F): 98.4 (12:35), Max: 99.7 (07:47)  HR: 72 (13:30) (60 - 86)  BP: 125/66 (13:30) (103/57 - 167/78)  RR: 18 (13:30) (18 - 18)  SpO2: 99% (13:30) (96% - 99%)    Neurological Exam:   Mental status: Awake, eyes closed and in mild distress from nausea and headache, opens eyes immediately to voice,  oriented to person, place, and time. Naming, repetition and comprehension intact.  No dysarthria, no aphasia.    Cranial nerves:   - Eyes: PERRL, EOMI with bilateral nystagmus, Visual fields full   - Face: BL V1-V3 sensation intact symmetrically, face symmetric   - ENT: Hearing intact to voice, palate elevation symmetric, tongue was midline  Motor: No drift in all 4 extremities, 5/5 JACKIE&LE. Normal tone and bulk.  No abnormal movements.    Sensation: Intact to light touch , no extinction   Coordination: LUE dysmetria   NIHSS: 1 (LUE dysmetria)      Labs:  WBC 8.85 /HGB 15.0 /MCV 83.9 /HCT 45.7 / / 11-09    11-10    140  |  109  |  11  ----------------------------<  108[H]  4.0   |  19  |  0.6[L]    Ca    8.1[L]      10 Nov 2024 08:29    TPro  6.3  /  Alb  3.8  /  TBili  0.7  /  DBili  0.2  /  AST  28  /  ALT  22  /  AlkPhos  83  11-10    LIVER FUNCTIONS - ( 10 Nov 2024 08:29 )  Alb: 3.8 g/dL / Pro: 6.3 g/dL / ALK PHOS: 83 U/L / ALT: 22 U/L / AST: 28 U/L / GGT: x             Urinalysis Basic - ( 10 Nov 2024 08:29 )    Color: x / Appearance: x / SG: x / pH: x  Gluc: 108 mg/dL / Ketone: x  / Bili: x / Urobili: x   Blood: x / Protein: x / Nitrite: x   Leuk Esterase: x / RBC: x / WBC x   Sq Epi: x / Non Sq Epi: x / Bacteria: x        Medications:  aspirin  chewable 81 milliGRAM(s) Oral daily  atorvastatin 80 milliGRAM(s) Oral at bedtime  enoxaparin Injectable 40 milliGRAM(s) SubCutaneous every 24 hours  mannitol 20% IVPB 18 Gram(s) IV Intermittent every 6 hours  meclizine 12.5 milliGRAM(s) Oral every 6 hours PRN  ondansetron Injectable 4 milliGRAM(s) IV Push three times a day PRN  sodium chloride 3%. 500 milliLiter(s) IV Continuous <Continuous>  sodium chloride 3%. 500 milliLiter(s) IV Continuous <Continuous>      Neuroimaging:  MR Head No Cont:   ACC: [id number] EXAM:  MR BRAIN   ORDERED BY: TERESA LYNN     PROCEDURE DATE:  11/10/2024          INTERPRETATION:  CLINICAL INDICATION: Left cerebellar infarct.    TECHNIQUE: MRI of the brain was performed without contrast.    COMPARISON: CT brain 11/9/2024    FINDINGS:  Large acute infarct involving the inferomedial left cerebellum with mass   effect resulting in partial effacement of the fourth ventricle without   hydrocephalus. There is mass effect resulting in anterolateral   displacement of medulla as well as left cerebellar tonsillar herniation   through the foramen magnum.    No acute intracranial hemorrhage. No extra-axial collection.    Age-related involutional changes and chronic microvascular ischemic   changes.    The orbits, sellar and suprasellar structures, and craniocervical   junction are unremarkable. Visualized paranasal sinuses and mastoid air   cells are clear.    IMPRESSION:  Large acute infarct involving the inferomedial left cerebellum with mass   effect resulting in partial effacement of the fourth ventricle without   hydrocephalus. There is mass effect resulting in anterolateral   displacement of medulla as well as left cerebellar tonsillar herniation   through the foramen magnum.    --- End of Report ---            BAM TORRES MD; Attending Radiologist  This document has been electronically signed. Nov 10 2024  9:56AM (11-10-24 @ 09:09)  CT Angio Head w/ IV Cont:   ACC: [id number] EXAM:  CT ANGIO BRAIN (W)AW IC   ORDERED BY: COLTON LOWRY     ACC: [id number] EXAM:  CT ANGIO NECK (W)AW IC   ORDERED BY: COLTON LOWRY     ACC: [id number] EXAM:  CT BRAIN   ORDERED BY: COLTON LOWRY     PROCEDURE DATE:  11/09/2024          INTERPRETATION:  CLINICAL INDICATION: Dizziness.    TECHNIQUE: Noncontrast head CT was performed. Sagittal and coronal   reformatted images were obtained.    CT angiography of the intracranial (head) and extracranial (neck)   circulation was performed after contrast administration    Maximal intensity projection images were additionally included and   reviewed.    100 mls of Omnipaque 350 was administered intravenously without   complication and 0 mls were discarded.    Using a separate workstation, 3-D shaded surface reformations were made   of vasculature. 3-D reformations were reviewed and included in   interpretation of the official report.    CAROTID STENOSIS REFERENCE: (NASCET = 100x1-(N/D)). N=greatest narrowing.   D=normal distal diameter - MILD = <50% stenosis. - MODERATE = 50-69%   stenosis. - SEVERE = 70-89% stenosis. - HAIRLINE/CRITICAL = 90-99%   stenosis. - OCCLUDED = 100% stenosis.    COMPARISON: None.    FINDINGS:    CT BRAIN:    There is an acute left cerebellar infarct. There is mild mass effect upon   the fourth ventricle. There is no hydrocephalus.    There is no evidence for acute intracranial hemorrhage, mass effect or   midline shift.    There are scattered cortical calcifications noted.    There is no extra-axial collection.    The visualized orbits are unremarkable.    The calvarium is intact, without depressed fracture.    The visualized paranasal sinuses and mastoid air cells are clear.    HEAD CTA:    The internal carotid arteries demonstratenormal enhancement to the   intracranial circulation and [place].    Anterior and middle cerebral arteries demonstrate normal enhancement and   symmetric arborization without intraluminal filling defect, stenosis,   occlusion, aneurysm or vascular malformation.    The right intradural vertebral artery demonstrates normal enhancement to   the vertebrobasilar confluence. The left intradural vertebral artery is   nonopacified. The left PICA appears nonopacified and likely occluded. The   posterior cerebral arteries demonstrate symmetric enhancement and   arborization without evidence for aneurysm, stenosis, occlusion or   vascular malformation.    Visualized dural venous sinuses and deep cerebral venous structures   demonstrate normal enhancement without evidence for filling defect.    NECK CTA:    The aortic arch and origins of the great vessels are within normal limits.    Right:  The origin of the right common carotid artery is normal. The   right common carotid artery is normal in course and caliber to the   carotid bifurcation. Origins of the internal and external carotid   arteries are normal by NASCET criteria. The right internal carotid artery   has normal course and caliber to the intracranial circulation.    The originof the right vertebral artery is normal. The right vertebral   artery is normal in course and caliber to the intracranial circulation.    Left: The origin of the left common carotid artery is normal. The left   common carotid artery is normal in course and caliber to the carotid   bifurcation. Origins of the internal and external carotid arteries are   normal by NASCET criteria. The left internal carotid artery has normal   course and caliber to the intracranial circulation.    The origin of the left vertebral artery is normal. There is diminished   contrast opacification of the proximal cervical vertebral artery with   loss of opacification involving the distal V2 segment.    IMPRESSION:  CT HEAD:  Acute infarct involving the left cerebellarhemisphere without evidence   for hemorrhage.    CTA HEAD/NECK:  Occluded left vertebral artery.    These findings were discussed with Dr. Lopez at 11/9/2024 7:37 PM by Dr. Cedillo with read back confirmation.    --- End of Report ---            RADHA CEDILLO MD; Attending Radiologist  This document has been electronically signed. Nov 9 2024  7:57PM (11-09-24 @ 19:38)  CT Head No Cont:   ACC: [id number] EXAM:  CT ANGIO BRAIN (W)AW IC   ORDERED BY: COLTON LOWRY     ACC: [id number] EXAM:  CT ANGIO NECK (W)AW IC   ORDERED BY: COLTON LOWRY     ACC: [id number] EXAM:  CT BRAIN   ORDERED BY: COLTON LOWRY     PROCEDURE DATE:  11/09/2024          INTERPRETATION:  CLINICAL INDICATION: Dizziness.    TECHNIQUE: Noncontrast head CT was performed. Sagittal and coronal   reformatted images were obtained.    CT angiography of the intracranial (head) and extracranial (neck)   circulation was performed after contrast administration    Maximal intensity projection images were additionally included and   reviewed.    100 mls of Omnipaque 350 was administered intravenously without   complication and 0 mls were discarded.    Using a separate workstation, 3-D shaded surface reformations were made   of vasculature. 3-D reformations were reviewed and included in   interpretation of the official report.    CAROTID STENOSIS REFERENCE: (NASCET = 100x1-(N/D)). N=greatest narrowing.   D=normal distal diameter - MILD = <50% stenosis. - MODERATE = 50-69%   stenosis. - SEVERE = 70-89% stenosis. - HAIRLINE/CRITICAL = 90-99%   stenosis. - OCCLUDED = 100% stenosis.    COMPARISON: None.    FINDINGS:    CT BRAIN:    There is an acute left cerebellar infarct. There is mild mass effect upon   the fourth ventricle. There is no hydrocephalus.    There is no evidence for acute intracranial hemorrhage, mass effect or   midline shift.    There are scattered cortical calcifications noted.    There is no extra-axial collection.    The visualized orbits are unremarkable.    The calvarium is intact, without depressed fracture.    The visualized paranasal sinuses and mastoid air cells are clear.    HEAD CTA:    The internal carotid arteries demonstratenormal enhancement to the   intracranial circulation and [place].    Anterior and middle cerebral arteries demonstrate normal enhancement and   symmetric arborization without intraluminal filling defect, stenosis,   occlusion, aneurysm or vascular malformation.    The right intradural vertebral artery demonstrates normal enhancement to   the vertebrobasilar confluence. The left intradural vertebral artery is   nonopacified. The left PICA appears nonopacified and likely occluded. The   posterior cerebral arteries demonstrate symmetric enhancement and   arborization without evidence for aneurysm, stenosis, occlusion or   vascular malformation.    Visualized dural venous sinuses and deep cerebral venous structures   demonstrate normal enhancement without evidence for filling defect.    NECK CTA:    The aortic arch and origins of the great vessels are within normal limits.    Right:  The origin of the right common carotid artery is normal. The   right common carotid artery is normal in course and caliber to the   carotid bifurcation. Origins of the internal and external carotid   arteries are normal by NASCET criteria. The right internal carotid artery   has normal course and caliber to the intracranial circulation.    The originof the right vertebral artery is normal. The right vertebral   artery is normal in course and caliber to the intracranial circulation.    Left: The origin of the left common carotid artery is normal. The left   common carotid artery is normal in course and caliber to the carotid   bifurcation. Origins of the internal and external carotid arteries are   normal by NASCET criteria. The left internal carotid artery has normal   course and caliber to the intracranial circulation.    The origin of the left vertebral artery is normal. There is diminished   contrast opacification of the proximal cervical vertebral artery with   loss of opacification involving the distal V2 segment.    IMPRESSION:  CT HEAD:  Acute infarct involving the left cerebellarhemisphere without evidence   for hemorrhage.    CTA HEAD/NECK:  Occluded left vertebral artery.    These findings were discussed with Dr. Lopez at 11/9/2024 7:37 PM by Dr. Cedillo with read back confirmation.    --- End of Report ---            RADHA CEDILLO MD; Attending Radiologist  This document has been electronically signed. Nov 9 2024  7:57PM (11-09-24 @ 19:34)

## 2024-11-10 NOTE — CHART NOTE - NSCHARTNOTEFT_GEN_A_CORE
[facility]    MARY FUENTES 64y Female [id number]    Hospital Day: 1d     64F. PMH: Hx of Cholecystitis s/p lap-roel, former smoker (13 pack years, quit 14 years ago)  Presented 11/9 c/o dizziness nausea and vomiting since waking up from sleep. LKW 11/8 11:30PM. Symptoms preceded by several days of cough, body aches, generalized weakness.   Patient at baseline is independent living on her own.     Hospital Course:  Patient arrived at ED /69, breathing on room air, nontachycardic, EKG showing NSR. Stroke code activated: NIHSS 0. CTH (+) for Acute infarct involving the left cerebellar hemisphere, CTP showed 5cc of penumbra in L hemisphere, with 4cc of core infarct. 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 No TNK OOW, No MT - due to low NIHSS. Admitted to stroke unit for further workup. 11/10 in AM, patient's MRI completed showing large cerebellar infarct with partial effacement of 4th ventricle and mass effect on the medulla and left cerebellar tonsillar herniation. On evaluation, patient was awake, attentive to voice though in mild distress from severe nausea. NIHSS now a 1 for dysmetria on LUE. NSICU consulted, hypertonic saline started. Patient upgraded to ICU for further monitoring.       Vital Signs  T(F): 98.4 (12:35), Max: 99.7 (07:47)  HR: 72 (13:30) (60 - 86)  BP: 125/66 (13:30) (103/57 - 167/78)  RR: 18 (13:30) (18 - 18)  SpO2: 99% (13:30) (96% - 99%)    Neurological Exam:   Mental status: Awake, alert and oriented to person, place, and time. Naming, repetition and comprehension intact.  No dysarthria, no aphasia.    Cranial nerves:   - Eyes: PERRL, EOMI without nystagmus, Visual fields full   - Face: BL V1-V3 sensation intact symmetrically, face symmetric   - ENT: Hearing intact to voice, palate elevation symmetric, tongue was midline  Motor: No drift in all 4 extremities, 5/5 JACKIE&LE. Normal tone and bulk.  No abnormal movements.    Sensation: Intact to light touch , no extinction   Coordination: No dysmetria on finger-to-nose and heel-to-shin.  No dysdiadokinesia.  NIHSS: 1 (LUE dysmetria)      Labs:  WBC 8.85 /HGB 15.0 /MCV 83.9 /HCT 45.7 / / 11-09    11-10    140  |  109  |  11  ----------------------------<  108[H]  4.0   |  19  |  0.6[L]    Ca    8.1[L]      10 Nov 2024 08:29    TPro  6.3  /  Alb  3.8  /  TBili  0.7  /  DBili  0.2  /  AST  28  /  ALT  22  /  AlkPhos  83  11-10    LIVER FUNCTIONS - ( 10 Nov 2024 08:29 )  Alb: 3.8 g/dL / Pro: 6.3 g/dL / ALK PHOS: 83 U/L / ALT: 22 U/L / AST: 28 U/L / GGT: x             Urinalysis Basic - ( 10 Nov 2024 08:29 )    Color: x / Appearance: x / SG: x / pH: x  Gluc: 108 mg/dL / Ketone: x  / Bili: x / Urobili: x   Blood: x / Protein: x / Nitrite: x   Leuk Esterase: x / RBC: x / WBC x   Sq Epi: x / Non Sq Epi: x / Bacteria: x        Medications:  aspirin  chewable 81 milliGRAM(s) Oral daily  atorvastatin 80 milliGRAM(s) Oral at bedtime  enoxaparin Injectable 40 milliGRAM(s) SubCutaneous every 24 hours  mannitol 20% IVPB 18 Gram(s) IV Intermittent every 6 hours  meclizine 12.5 milliGRAM(s) Oral every 6 hours PRN  ondansetron Injectable 4 milliGRAM(s) IV Push three times a day PRN  sodium chloride 3%. 500 milliLiter(s) IV Continuous <Continuous>  sodium chloride 3%. 500 milliLiter(s) IV Continuous <Continuous>      Neuroimaging:  MR Head No Cont:   ACC: [id number] EXAM:  MR BRAIN   ORDERED BY: TERESA LYNN     PROCEDURE DATE:  11/10/2024          INTERPRETATION:  CLINICAL INDICATION: Left cerebellar infarct.    TECHNIQUE: MRI of the brain was performed without contrast.    COMPARISON: CT brain 11/9/2024    FINDINGS:  Large acute infarct involving the inferomedial left cerebellum with mass   effect resulting in partial effacement of the fourth ventricle without   hydrocephalus. There is mass effect resulting in anterolateral   displacement of medulla as well as left cerebellar tonsillar herniation   through the foramen magnum.    No acute intracranial hemorrhage. No extra-axial collection.    Age-related involutional changes and chronic microvascular ischemic   changes.    The orbits, sellar and suprasellar structures, and craniocervical   junction are unremarkable. Visualized paranasal sinuses and mastoid air   cells are clear.    IMPRESSION:  Large acute infarct involving the inferomedial left cerebellum with mass   effect resulting in partial effacement of the fourth ventricle without   hydrocephalus. There is mass effect resulting in anterolateral   displacement of medulla as well as left cerebellar tonsillar herniation   through the foramen magnum.    --- End of Report ---            BAM TORRES MD; Attending Radiologist  This document has been electronically signed. Nov 10 2024  9:56AM (11-10-24 @ 09:09)  CT Angio Head w/ IV Cont:   ACC: [id number] EXAM:  CT ANGIO BRAIN (W)AW IC   ORDERED BY: COLTON LOWRY     ACC: [id number] EXAM:  CT ANGIO NECK (W)AW IC   ORDERED BY: COLTON LWORY     ACC: [id number] EXAM:  CT BRAIN   ORDERED BY: COLTON LOWRY     PROCEDURE DATE:  11/09/2024          INTERPRETATION:  CLINICAL INDICATION: Dizziness.    TECHNIQUE: Noncontrast head CT was performed. Sagittal and coronal   reformatted images were obtained.    CT angiography of the intracranial (head) and extracranial (neck)   circulation was performed after contrast administration    Maximal intensity projection images were additionally included and   reviewed.    100 mls of Omnipaque 350 was administered intravenously without   complication and 0 mls were discarded.    Using a separate workstation, 3-D shaded surface reformations were made   of vasculature. 3-D reformations were reviewed and included in   interpretation of the official report.    CAROTID STENOSIS REFERENCE: (NASCET = 100x1-(N/D)). N=greatest narrowing.   D=normal distal diameter - MILD = <50% stenosis. - MODERATE = 50-69%   stenosis. - SEVERE = 70-89% stenosis. - HAIRLINE/CRITICAL = 90-99%   stenosis. - OCCLUDED = 100% stenosis.    COMPARISON: None.    FINDINGS:    CT BRAIN:    There is an acute left cerebellar infarct. There is mild mass effect upon   the fourth ventricle. There is no hydrocephalus.    There is no evidence for acute intracranial hemorrhage, mass effect or   midline shift.    There are scattered cortical calcifications noted.    There is no extra-axial collection.    The visualized orbits are unremarkable.    The calvarium is intact, without depressed fracture.    The visualized paranasal sinuses and mastoid air cells are clear.    HEAD CTA:    The internal carotid arteries demonstratenormal enhancement to the   intracranial circulation and [place].    Anterior and middle cerebral arteries demonstrate normal enhancement and   symmetric arborization without intraluminal filling defect, stenosis,   occlusion, aneurysm or vascular malformation.    The right intradural vertebral artery demonstrates normal enhancement to   the vertebrobasilar confluence. The left intradural vertebral artery is   nonopacified. The left PICA appears nonopacified and likely occluded. The   posterior cerebral arteries demonstrate symmetric enhancement and   arborization without evidence for aneurysm, stenosis, occlusion or   vascular malformation.    Visualized dural venous sinuses and deep cerebral venous structures   demonstrate normal enhancement without evidence for filling defect.    NECK CTA:    The aortic arch and origins of the great vessels are within normal limits.    Right:  The origin of the right common carotid artery is normal. The   right common carotid artery is normal in course and caliber to the   carotid bifurcation. Origins of the internal and external carotid   arteries are normal by NASCET criteria. The right internal carotid artery   has normal course and caliber to the intracranial circulation.    The originof the right vertebral artery is normal. The right vertebral   artery is normal in course and caliber to the intracranial circulation.    Left: The origin of the left common carotid artery is normal. The left   common carotid artery is normal in course and caliber to the carotid   bifurcation. Origins of the internal and external carotid arteries are   normal by NASCET criteria. The left internal carotid artery has normal   course and caliber to the intracranial circulation.    The origin of the left vertebral artery is normal. There is diminished   contrast opacification of the proximal cervical vertebral artery with   loss of opacification involving the distal V2 segment.    IMPRESSION:  CT HEAD:  Acute infarct involving the left cerebellarhemisphere without evidence   for hemorrhage.    CTA HEAD/NECK:  Occluded left vertebral artery.    These findings were discussed with Dr. Lopez at 11/9/2024 7:37 PM by Dr. Cedillo with read back confirmation.    --- End of Report ---            RADHA CEDILLO MD; Attending Radiologist  This document has been electronically signed. Nov 9 2024  7:57PM (11-09-24 @ 19:38)  CT Head No Cont:   ACC: [id number] EXAM:  CT ANGIO BRAIN (W)AW IC   ORDERED BY: COLTON LOWRY     ACC: [id number] EXAM:  CT ANGIO NECK (W)AW IC   ORDERED BY: COLTON LOWRY     ACC: [id number] EXAM:  CT BRAIN   ORDERED BY: COLTON LOWRY     PROCEDURE DATE:  11/09/2024          INTERPRETATION:  CLINICAL INDICATION: Dizziness.    TECHNIQUE: Noncontrast head CT was performed. Sagittal and coronal   reformatted images were obtained.    CT angiography of the intracranial (head) and extracranial (neck)   circulation was performed after contrast administration    Maximal intensity projection images were additionally included and   reviewed.    100 mls of Omnipaque 350 was administered intravenously without   complication and 0 mls were discarded.    Using a separate workstation, 3-D shaded surface reformations were made   of vasculature. 3-D reformations were reviewed and included in   interpretation of the official report.    CAROTID STENOSIS REFERENCE: (NASCET = 100x1-(N/D)). N=greatest narrowing.   D=normal distal diameter - MILD = <50% stenosis. - MODERATE = 50-69%   stenosis. - SEVERE = 70-89% stenosis. - HAIRLINE/CRITICAL = 90-99%   stenosis. - OCCLUDED = 100% stenosis.    COMPARISON: None.    FINDINGS:    CT BRAIN:    There is an acute left cerebellar infarct. There is mild mass effect upon   the fourth ventricle. There is no hydrocephalus.    There is no evidence for acute intracranial hemorrhage, mass effect or   midline shift.    There are scattered cortical calcifications noted.    There is no extra-axial collection.    The visualized orbits are unremarkable.    The calvarium is intact, without depressed fracture.    The visualized paranasal sinuses and mastoid air cells are clear.    HEAD CTA:    The internal carotid arteries demonstratenormal enhancement to the   intracranial circulation and [place].    Anterior and middle cerebral arteries demonstrate normal enhancement and   symmetric arborization without intraluminal filling defect, stenosis,   occlusion, aneurysm or vascular malformation.    The right intradural vertebral artery demonstrates normal enhancement to   the vertebrobasilar confluence. The left intradural vertebral artery is   nonopacified. The left PICA appears nonopacified and likely occluded. The   posterior cerebral arteries demonstrate symmetric enhancement and   arborization without evidence for aneurysm, stenosis, occlusion or   vascular malformation.    Visualized dural venous sinuses and deep cerebral venous structures   demonstrate normal enhancement without evidence for filling defect.    NECK CTA:    The aortic arch and origins of the great vessels are within normal limits.    Right:  The origin of the right common carotid artery is normal. The   right common carotid artery is normal in course and caliber to the   carotid bifurcation. Origins of the internal and external carotid   arteries are normal by NASCET criteria. The right internal carotid artery   has normal course and caliber to the intracranial circulation.    The originof the right vertebral artery is normal. The right vertebral   artery is normal in course and caliber to the intracranial circulation.    Left: The origin of the left common carotid artery is normal. The left   common carotid artery is normal in course and caliber to the carotid   bifurcation. Origins of the internal and external carotid arteries are   normal by NASCET criteria. The left internal carotid artery has normal   course and caliber to the intracranial circulation.    The origin of the left vertebral artery is normal. There is diminished   contrast opacification of the proximal cervical vertebral artery with   loss of opacification involving the distal V2 segment.    IMPRESSION:  CT HEAD:  Acute infarct involving the left cerebellarhemisphere without evidence   for hemorrhage.    CTA HEAD/NECK:  Occluded left vertebral artery.    These findings were discussed with Dr. Lopez at 11/9/2024 7:37 PM by Dr. eCdillo with read back confirmation.    --- End of Report ---            RADHA CEDILLO MD; Attending Radiologist  This document has been electronically signed. Nov 9 2024  7:57PM (11-09-24 @ 19:34) [facility]    MARY FUENTES 64y Female [id number]    Hospital Day: 1d     64F. PMH: Hx of Cholecystitis s/p lap-roel, former smoker (13 pack years, quit 14 years ago)  Presented 11/9 c/o dizziness nausea and vomiting since waking up from sleep. LKW 11/8 11:30PM. Symptoms preceded by several days of cough, body aches, generalized weakness.   Patient at baseline is independent living on her own.     Hospital Course:  Patient arrived at ED /69, breathing on room air, nontachycardic, EKG showing NSR. Stroke code activated: NIHSS 0. CTH (+) for Acute infarct involving the left cerebellar hemisphere, CTP showed 5cc of penumbra in L hemisphere, with 4cc of core infarct. 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 No TNK OOW, No MT - due to low NIHSS. Admitted to stroke unit for further workup. 11/10 in AM, patient's MRI completed showing large cerebellar infarct with partial effacement of 4th ventricle and mass effect on the medulla and left cerebellar tonsillar herniation. On evaluation, patient was awake, attentive to voice though in mild distress from severe nausea. NIHSS now a 1 for dysmetria on LUE. NSICU consulted, hypertonic saline started. Patient upgraded to ICU for further monitoring.       Vital Signs  T(F): 98.4 (12:35), Max: 99.7 (07:47)  HR: 72 (13:30) (60 - 86)  BP: 125/66 (13:30) (103/57 - 167/78)  RR: 18 (13:30) (18 - 18)  SpO2: 99% (13:30) (96% - 99%)    Neurological Exam:   Mental status: Awake, eyes closed and in mild distress from nausea, opens eyes immediately to voice,  oriented to person, place, and time. Naming, repetition and comprehension intact.  No dysarthria, no aphasia.    Cranial nerves:   - Eyes: PERRL, EOMI with bilateral nystagmus, Visual fields full   - Face: BL V1-V3 sensation intact symmetrically, face symmetric   - ENT: Hearing intact to voice, palate elevation symmetric, tongue was midline  Motor: No drift in all 4 extremities, 5/5 JACKIE&LE. Normal tone and bulk.  No abnormal movements.    Sensation: Intact to light touch , no extinction   Coordination: LUE dysmetria   NIHSS: 1 (LUE dysmetria)      Labs:  WBC 8.85 /HGB 15.0 /MCV 83.9 /HCT 45.7 / / 11-09    11-10    140  |  109  |  11  ----------------------------<  108[H]  4.0   |  19  |  0.6[L]    Ca    8.1[L]      10 Nov 2024 08:29    TPro  6.3  /  Alb  3.8  /  TBili  0.7  /  DBili  0.2  /  AST  28  /  ALT  22  /  AlkPhos  83  11-10    LIVER FUNCTIONS - ( 10 Nov 2024 08:29 )  Alb: 3.8 g/dL / Pro: 6.3 g/dL / ALK PHOS: 83 U/L / ALT: 22 U/L / AST: 28 U/L / GGT: x             Urinalysis Basic - ( 10 Nov 2024 08:29 )    Color: x / Appearance: x / SG: x / pH: x  Gluc: 108 mg/dL / Ketone: x  / Bili: x / Urobili: x   Blood: x / Protein: x / Nitrite: x   Leuk Esterase: x / RBC: x / WBC x   Sq Epi: x / Non Sq Epi: x / Bacteria: x        Medications:  aspirin  chewable 81 milliGRAM(s) Oral daily  atorvastatin 80 milliGRAM(s) Oral at bedtime  enoxaparin Injectable 40 milliGRAM(s) SubCutaneous every 24 hours  mannitol 20% IVPB 18 Gram(s) IV Intermittent every 6 hours  meclizine 12.5 milliGRAM(s) Oral every 6 hours PRN  ondansetron Injectable 4 milliGRAM(s) IV Push three times a day PRN  sodium chloride 3%. 500 milliLiter(s) IV Continuous <Continuous>  sodium chloride 3%. 500 milliLiter(s) IV Continuous <Continuous>      64F. PMH: Hx of Cholecystitis s/p lap-roel, former smoker (13 pack years, quit 14 years ago). Presented 11/9 c/o dizziness nausea and vomiting since waking up from sleep. ROSA MW 11/8 11:30PM Stroke code activated: NIHSS 0. CTH (+) for Acute infarct involving the left cerebellar hemisphere, CTP showed 5cc of penumbra in L hemisphere, with 4cc of core infarct. 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 No TNK OOW, No MT - due to low NIHSS. Admitted to stroke unit for further workup. 11/10 in AM, patient's MRI completed showing large cerebellar infarct with partial effacement of 4th ventricle and mass effect on the medulla and left cerebellar tonsillar herniation. On evaluation, patient was awake, attentive to voice though in mild distress from severe nausea. NIHSS now a 1 for dysmetria on LUE. NSICU consulted, hypertonic saline started. Patient upgraded to ICU for further monitoring. Etiology likely SHEFALI vs ESUS. Patient will be admitted to stroke unit for monitoring and further workup.      NEUROLOGY  #Stroke workup  - Stroke Code CTH/CTP/CTA: Completed and reviewed  - S/p Load Aspirin 325mg and Plavix 300mg  - Neurochecks & Vitals q1 hrs  - Fall and Aspiration precautions  - Physiatry eval/Physical therapy/Occupational therapy/Speech and Swallow  - Neurosurgery Consult - SOC/Hydro watch   - F/u HbA1c, TSH and Lipid panel  - F/u MRI Brain Non-con  - MRA Dissection protocol   - F/u TTE w bubble study  - C/w Aspirin 81mg   - Hold Plavix  - hypertonic saline 3% IVF and mannitol IVPB; goal Na 145-155; serum osm 310-320, monitor I/Os, bmp q6  - C/w Atorvastatin 80mg daily  - C/w DVT PPX as below    CARDIOLOGY  #Hypertension  - Goal -220 for first 24H  - hold home blood pressure medication for now  - obtain TTE with bubble  - Stroke Code EKG Results: NSR    #HLD - LDL results: 89  - high dose statin as above    PULMONOLOGY  - call provider if SPO2 < 94%    GATROENTEROLOGY  - Diet: DASH    RENAL/ELECTROLYTES  - Replete K<4 and Mg <2    INFECTIOUS DISEASE  - Monitor     ENDOCRINE  - A1C results:   - TSH results:  - ISS if indicated    HEME/ONC  - DVT ppx: Lovenox, SCDs    Dispo  - NSICU  - Physiatry eval:  - Physical therapy Eval:

## 2024-11-10 NOTE — PATIENT PROFILE ADULT - FUNCTIONAL ASSESSMENT - BASIC MOBILITY 3.
more cheerful but anxious and affect somewhat inappropriate to thought content.  ; not acutely suicidal.   anxious and not as paranoid;  .   does attend gorups but strange at times.  seems more labile but not irritable.    Plan for long-term hospitalization in view of fragility and chronic paranoid ideation and depressed mood  with significant anxiety. 3 = A little assistance

## 2024-11-10 NOTE — CONSULT NOTE ADULT - ATTENDING COMMENTS
64F PMHx of cholecystitis s/p lap roel, not on any medications, presents to ED for dizziness, nausea, and vomiting. LKW 11:30pm the night before. Found to have left cerebellar infarct on CTH and left vertebral artery occlusion on CTA in the ED on 11/9- pt was loaded with ASA/plavix and admitted to stroke neuro  MR done on 11/10 Large acute infarct involving the inferomedial left cerebellum with mass effect resulting in partial effacement of the fourth ventricle without hydrocephalus. There is mass effect resulting in anterolateral displacement of medulla as well as left cerebellar tonsillar herniation through the foramen magnum.    stroke neuro consulted nsicu for evaluation   Pt seen evaluated and chart reviewed    awake alert oriented x3, fluent speech, follows commands on all four, perrl, eomi, motor 5/5 on all four, LUE dysmetria, sensory intact     q1 nihss  hold DAPT  hypertonic saline 3% IVF and mannitol IVPB; goal Na 145-155; serum osm 310-320, monitor I/Os, bmp q6  permissive HTN; pt with completed L cerebellar infarct  NSGY consultation- SOC/hydro watch      dispo- NSICU    pt and family updated at bedside.   pt consented for CVC and A-line 64F PMHx of cholecystitis s/p lap roel, not on any medications, presents to ED for dizziness, nausea, and vomiting. LKW 11:30pm the night before. Found to have left cerebellar infarct on CTH and left vertebral artery occlusion on CTA in the ED on 11/9- pt was loaded with ASA/plavix and admitted to stroke neuro  MR done on 11/10 Large acute infarct involving the inferomedial left cerebellum with mass effect resulting in partial effacement of the fourth ventricle without hydrocephalus. There is mass effect resulting in anterolateral displacement of medulla as well as left cerebellar tonsillar herniation through the foramen magnum.    stroke neuro consulted nsicu for evaluation   Pt seen evaluated and chart reviewed    awake alert oriented x3, fluent speech, follows commands on all four, perrl, eomi, motor 5/5 on all four, LUE dysmetria, sensory intact     q1 nihss  hold plavix; c/w ASA , statin   hypertonic saline 3% IVF and mannitol IVPB; goal Na 145-155; serum osm 310-320, monitor I/Os, bmp q6  permissive HTN; pt with completed L cerebellar infarct  NSGY consultation- SOC/hydro watch  TTE  MRA h/n dissection protocol  stroke core measures      dispo- NSICU    pt and family updated at bedside.   pt consented for CVC and A-line

## 2024-11-10 NOTE — ED ADULT NURSE REASSESSMENT NOTE - NS ED NURSE REASSESS COMMENT FT1
ICU attending at bedside . Patient tranferred to critical care ,report givent to nurse ,[facility] remains 0.

## 2024-11-11 ENCOUNTER — RESULT REVIEW (OUTPATIENT)
Age: 64
End: 2024-11-11

## 2024-11-11 LAB
A1C WITH ESTIMATED AVERAGE GLUCOSE RESULT: 5.9 % — HIGH (ref 4–5.6)
ALBUMIN SERPL ELPH-MCNC: 3.6 G/DL — SIGNIFICANT CHANGE UP (ref 3.5–5.2)
ALP SERPL-CCNC: 79 U/L — SIGNIFICANT CHANGE UP (ref 30–115)
ALT FLD-CCNC: 25 U/L — SIGNIFICANT CHANGE UP (ref 0–41)
ANION GAP SERPL CALC-SCNC: 10 MMOL/L — SIGNIFICANT CHANGE UP (ref 7–14)
ANION GAP SERPL CALC-SCNC: 10 MMOL/L — SIGNIFICANT CHANGE UP (ref 7–14)
ANION GAP SERPL CALC-SCNC: 12 MMOL/L — SIGNIFICANT CHANGE UP (ref 7–14)
ANION GAP SERPL CALC-SCNC: 7 MMOL/L — SIGNIFICANT CHANGE UP (ref 7–14)
AST SERPL-CCNC: 35 U/L — SIGNIFICANT CHANGE UP (ref 0–41)
BASOPHILS # BLD AUTO: 0.02 K/UL — SIGNIFICANT CHANGE UP (ref 0–0.2)
BASOPHILS # BLD AUTO: 0.03 K/UL — SIGNIFICANT CHANGE UP (ref 0–0.2)
BASOPHILS NFR BLD AUTO: 0.3 % — SIGNIFICANT CHANGE UP (ref 0–1)
BASOPHILS NFR BLD AUTO: 0.4 % — SIGNIFICANT CHANGE UP (ref 0–1)
BILIRUB SERPL-MCNC: 0.7 MG/DL — SIGNIFICANT CHANGE UP (ref 0.2–1.2)
BUN SERPL-MCNC: 10 MG/DL — SIGNIFICANT CHANGE UP (ref 10–20)
BUN SERPL-MCNC: 10 MG/DL — SIGNIFICANT CHANGE UP (ref 10–20)
BUN SERPL-MCNC: 7 MG/DL — LOW (ref 10–20)
BUN SERPL-MCNC: 8 MG/DL — LOW (ref 10–20)
CALCIUM SERPL-MCNC: 7.7 MG/DL — LOW (ref 8.4–10.5)
CALCIUM SERPL-MCNC: 7.9 MG/DL — LOW (ref 8.4–10.4)
CALCIUM SERPL-MCNC: 8 MG/DL — LOW (ref 8.4–10.5)
CALCIUM SERPL-MCNC: 8.6 MG/DL — SIGNIFICANT CHANGE UP (ref 8.4–10.4)
CHLORIDE SERPL-SCNC: 110 MMOL/L — SIGNIFICANT CHANGE UP (ref 98–110)
CHLORIDE SERPL-SCNC: 112 MMOL/L — HIGH (ref 98–110)
CHLORIDE SERPL-SCNC: 112 MMOL/L — HIGH (ref 98–110)
CHLORIDE SERPL-SCNC: 113 MMOL/L — HIGH (ref 98–110)
CHOLEST SERPL-MCNC: 122 MG/DL — SIGNIFICANT CHANGE UP
CO2 SERPL-SCNC: 21 MMOL/L — SIGNIFICANT CHANGE UP (ref 17–32)
CO2 SERPL-SCNC: 22 MMOL/L — SIGNIFICANT CHANGE UP (ref 17–32)
CREAT SERPL-MCNC: 0.5 MG/DL — LOW (ref 0.7–1.5)
CREAT SERPL-MCNC: 0.5 MG/DL — LOW (ref 0.7–1.5)
CREAT SERPL-MCNC: 0.6 MG/DL — LOW (ref 0.7–1.5)
CREAT SERPL-MCNC: 0.6 MG/DL — LOW (ref 0.7–1.5)
EGFR: 100 ML/MIN/1.73M2 — SIGNIFICANT CHANGE UP
EGFR: 100 ML/MIN/1.73M2 — SIGNIFICANT CHANGE UP
EGFR: 104 ML/MIN/1.73M2 — SIGNIFICANT CHANGE UP
EGFR: 104 ML/MIN/1.73M2 — SIGNIFICANT CHANGE UP
EOSINOPHIL # BLD AUTO: 0 K/UL — SIGNIFICANT CHANGE UP (ref 0–0.7)
EOSINOPHIL # BLD AUTO: 0 K/UL — SIGNIFICANT CHANGE UP (ref 0–0.7)
EOSINOPHIL NFR BLD AUTO: 0 % — SIGNIFICANT CHANGE UP (ref 0–8)
EOSINOPHIL NFR BLD AUTO: 0 % — SIGNIFICANT CHANGE UP (ref 0–8)
ESTIMATED AVERAGE GLUCOSE: 123 MG/DL — HIGH (ref 68–114)
GLUCOSE SERPL-MCNC: 132 MG/DL — HIGH (ref 70–99)
GLUCOSE SERPL-MCNC: 82 MG/DL — SIGNIFICANT CHANGE UP (ref 70–99)
GLUCOSE SERPL-MCNC: 91 MG/DL — SIGNIFICANT CHANGE UP (ref 70–99)
GLUCOSE SERPL-MCNC: 98 MG/DL — SIGNIFICANT CHANGE UP (ref 70–99)
HAV IGM SER-ACNC: SIGNIFICANT CHANGE UP
HBV CORE IGM SER-ACNC: SIGNIFICANT CHANGE UP
HBV SURFACE AG SER-ACNC: SIGNIFICANT CHANGE UP
HCT VFR BLD CALC: 36.7 % — LOW (ref 37–47)
HCT VFR BLD CALC: 39.1 % — SIGNIFICANT CHANGE UP (ref 37–47)
HCV AB S/CO SERPL IA: 0.1 S/CO — SIGNIFICANT CHANGE UP (ref 0–0.99)
HCV AB SERPL-IMP: SIGNIFICANT CHANGE UP
HDLC SERPL-MCNC: 41 MG/DL — LOW
HGB BLD-MCNC: 11.8 G/DL — LOW (ref 12–16)
HGB BLD-MCNC: 12.5 G/DL — SIGNIFICANT CHANGE UP (ref 12–16)
HIV 1+2 AB+HIV1 P24 AG SERPL QL IA: SIGNIFICANT CHANGE UP
IMM GRANULOCYTES NFR BLD AUTO: 0.3 % — SIGNIFICANT CHANGE UP (ref 0.1–0.3)
IMM GRANULOCYTES NFR BLD AUTO: 0.3 % — SIGNIFICANT CHANGE UP (ref 0.1–0.3)
INR BLD: 1.07 RATIO — SIGNIFICANT CHANGE UP (ref 0.65–1.3)
LIPID PNL WITH DIRECT LDL SERPL: 66 MG/DL — SIGNIFICANT CHANGE UP
LYMPHOCYTES # BLD AUTO: 1.45 K/UL — SIGNIFICANT CHANGE UP (ref 1.2–3.4)
LYMPHOCYTES # BLD AUTO: 1.85 K/UL — SIGNIFICANT CHANGE UP (ref 1.2–3.4)
LYMPHOCYTES # BLD AUTO: 19.3 % — LOW (ref 20.5–51.1)
LYMPHOCYTES # BLD AUTO: 26.1 % — SIGNIFICANT CHANGE UP (ref 20.5–51.1)
MAGNESIUM SERPL-MCNC: 2.2 MG/DL — SIGNIFICANT CHANGE UP (ref 1.8–2.4)
MCHC RBC-ENTMCNC: 27.4 PG — SIGNIFICANT CHANGE UP (ref 27–31)
MCHC RBC-ENTMCNC: 27.6 PG — SIGNIFICANT CHANGE UP (ref 27–31)
MCHC RBC-ENTMCNC: 32 G/DL — SIGNIFICANT CHANGE UP (ref 32–37)
MCHC RBC-ENTMCNC: 32.2 G/DL — SIGNIFICANT CHANGE UP (ref 32–37)
MCV RBC AUTO: 85.3 FL — SIGNIFICANT CHANGE UP (ref 81–99)
MCV RBC AUTO: 86.3 FL — SIGNIFICANT CHANGE UP (ref 81–99)
MONOCYTES # BLD AUTO: 0.41 K/UL — SIGNIFICANT CHANGE UP (ref 0.1–0.6)
MONOCYTES # BLD AUTO: 0.44 K/UL — SIGNIFICANT CHANGE UP (ref 0.1–0.6)
MONOCYTES NFR BLD AUTO: 5.8 % — SIGNIFICANT CHANGE UP (ref 1.7–9.3)
MONOCYTES NFR BLD AUTO: 5.8 % — SIGNIFICANT CHANGE UP (ref 1.7–9.3)
NEUTROPHILS # BLD AUTO: 4.79 K/UL — SIGNIFICANT CHANGE UP (ref 1.4–6.5)
NEUTROPHILS # BLD AUTO: 5.6 K/UL — SIGNIFICANT CHANGE UP (ref 1.4–6.5)
NEUTROPHILS NFR BLD AUTO: 67.4 % — SIGNIFICANT CHANGE UP (ref 42.2–75.2)
NEUTROPHILS NFR BLD AUTO: 74.3 % — SIGNIFICANT CHANGE UP (ref 42.2–75.2)
NON HDL CHOLESTEROL: 81 MG/DL — SIGNIFICANT CHANGE UP
NRBC # BLD: 0 /100 WBCS — SIGNIFICANT CHANGE UP (ref 0–0)
NRBC # BLD: 0 /100 WBCS — SIGNIFICANT CHANGE UP (ref 0–0)
OSMOLALITY SERPL: 291 MOS/KG — SIGNIFICANT CHANGE UP (ref 280–301)
PA ADP PRP-ACNC: 205 PRU — SIGNIFICANT CHANGE UP (ref 182–335)
PHOSPHATE SERPL-MCNC: 2.7 MG/DL — SIGNIFICANT CHANGE UP (ref 2.1–4.9)
PLATELET # BLD AUTO: 178 K/UL — SIGNIFICANT CHANGE UP (ref 130–400)
PLATELET # BLD AUTO: 190 K/UL — SIGNIFICANT CHANGE UP (ref 130–400)
PMV BLD: 10.7 FL — HIGH (ref 7.4–10.4)
PMV BLD: 10.9 FL — HIGH (ref 7.4–10.4)
POTASSIUM SERPL-MCNC: 3.5 MMOL/L — SIGNIFICANT CHANGE UP (ref 3.5–5)
POTASSIUM SERPL-MCNC: 3.7 MMOL/L — SIGNIFICANT CHANGE UP (ref 3.5–5)
POTASSIUM SERPL-MCNC: 3.7 MMOL/L — SIGNIFICANT CHANGE UP (ref 3.5–5)
POTASSIUM SERPL-MCNC: 4 MMOL/L — SIGNIFICANT CHANGE UP (ref 3.5–5)
POTASSIUM SERPL-SCNC: 3.5 MMOL/L — SIGNIFICANT CHANGE UP (ref 3.5–5)
POTASSIUM SERPL-SCNC: 3.7 MMOL/L — SIGNIFICANT CHANGE UP (ref 3.5–5)
POTASSIUM SERPL-SCNC: 3.7 MMOL/L — SIGNIFICANT CHANGE UP (ref 3.5–5)
POTASSIUM SERPL-SCNC: 4 MMOL/L — SIGNIFICANT CHANGE UP (ref 3.5–5)
PROT SERPL-MCNC: 5.8 G/DL — LOW (ref 6–8)
PROTHROM AB SERPL-ACNC: 12.6 SEC — SIGNIFICANT CHANGE UP (ref 9.95–12.87)
RBC # BLD: 4.3 M/UL — SIGNIFICANT CHANGE UP (ref 4.2–5.4)
RBC # BLD: 4.53 M/UL — SIGNIFICANT CHANGE UP (ref 4.2–5.4)
RBC # FLD: 13.1 % — SIGNIFICANT CHANGE UP (ref 11.5–14.5)
RBC # FLD: 13.2 % — SIGNIFICANT CHANGE UP (ref 11.5–14.5)
SODIUM SERPL-SCNC: 141 MMOL/L — SIGNIFICANT CHANGE UP (ref 135–146)
SODIUM SERPL-SCNC: 142 MMOL/L — SIGNIFICANT CHANGE UP (ref 135–146)
SODIUM SERPL-SCNC: 144 MMOL/L — SIGNIFICANT CHANGE UP (ref 135–146)
SODIUM SERPL-SCNC: 146 MMOL/L — SIGNIFICANT CHANGE UP (ref 135–146)
TRIGL SERPL-MCNC: 74 MG/DL — SIGNIFICANT CHANGE UP
TSH SERPL-MCNC: 1.77 UIU/ML — SIGNIFICANT CHANGE UP (ref 0.27–4.2)
WBC # BLD: 7.1 K/UL — SIGNIFICANT CHANGE UP (ref 4.8–10.8)
WBC # BLD: 7.53 K/UL — SIGNIFICANT CHANGE UP (ref 4.8–10.8)
WBC # FLD AUTO: 7.1 K/UL — SIGNIFICANT CHANGE UP (ref 4.8–10.8)
WBC # FLD AUTO: 7.53 K/UL — SIGNIFICANT CHANGE UP (ref 4.8–10.8)

## 2024-11-11 PROCEDURE — 93970 EXTREMITY STUDY: CPT | Mod: 26

## 2024-11-11 PROCEDURE — 70450 CT HEAD/BRAIN W/O DYE: CPT | Mod: 26

## 2024-11-11 PROCEDURE — 93306 TTE W/DOPPLER COMPLETE: CPT | Mod: 26

## 2024-11-11 PROCEDURE — 99231 SBSQ HOSP IP/OBS SF/LOW 25: CPT

## 2024-11-11 RX ORDER — SODIUM CHLORIDE 3 G/100ML
500 INJECTION, SOLUTION INTRAVENOUS
Refills: 0 | Status: DISCONTINUED | OUTPATIENT
Start: 2024-11-11 | End: 2024-11-11

## 2024-11-11 RX ORDER — POTASSIUM CHLORIDE 600 MG/1
20 TABLET, EXTENDED RELEASE ORAL
Refills: 0 | Status: COMPLETED | OUTPATIENT
Start: 2024-11-11 | End: 2024-11-11

## 2024-11-11 RX ORDER — ACETAMINOPHEN 500MG 500 MG/1
650 TABLET, COATED ORAL EVERY 6 HOURS
Refills: 0 | Status: DISCONTINUED | OUTPATIENT
Start: 2024-11-11 | End: 2024-11-14

## 2024-11-11 RX ORDER — SENNOSIDES 8.6 MG
2 TABLET ORAL AT BEDTIME
Refills: 0 | Status: DISCONTINUED | OUTPATIENT
Start: 2024-11-11 | End: 2024-11-21

## 2024-11-11 RX ORDER — POLYETHYLENE GLYCOL 3350 17 G/17G
17 POWDER, FOR SOLUTION ORAL DAILY
Refills: 0 | Status: DISCONTINUED | OUTPATIENT
Start: 2024-11-11 | End: 2024-11-15

## 2024-11-11 RX ORDER — SODIUM CHLORIDE 3 G/100ML
250 INJECTION, SOLUTION INTRAVENOUS ONCE
Refills: 0 | Status: COMPLETED | OUTPATIENT
Start: 2024-11-11 | End: 2024-11-11

## 2024-11-11 RX ADMIN — Medication 81 MILLIGRAM(S): at 11:04

## 2024-11-11 RX ADMIN — Medication 80 MILLIGRAM(S): at 21:09

## 2024-11-11 RX ADMIN — POLYETHYLENE GLYCOL 3350 17 GRAM(S): 17 POWDER, FOR SOLUTION ORAL at 18:51

## 2024-11-11 RX ADMIN — SODIUM CHLORIDE 750 MILLILITER(S): 3 INJECTION, SOLUTION INTRAVENOUS at 18:47

## 2024-11-11 RX ADMIN — POTASSIUM CHLORIDE 20 MILLIEQUIVALENT(S): 600 TABLET, EXTENDED RELEASE ORAL at 09:23

## 2024-11-11 RX ADMIN — ACETAMINOPHEN 500MG 650 MILLIGRAM(S): 500 TABLET, COATED ORAL at 11:00

## 2024-11-11 RX ADMIN — POTASSIUM CHLORIDE 50 MILLIEQUIVALENT(S): 600 TABLET, EXTENDED RELEASE ORAL at 05:59

## 2024-11-11 RX ADMIN — Medication 200 GRAM(S): at 03:26

## 2024-11-11 RX ADMIN — ACETAMINOPHEN 500MG 650 MILLIGRAM(S): 500 TABLET, COATED ORAL at 18:07

## 2024-11-11 RX ADMIN — POTASSIUM CHLORIDE 20 MILLIEQUIVALENT(S): 600 TABLET, EXTENDED RELEASE ORAL at 11:04

## 2024-11-11 RX ADMIN — ACETAMINOPHEN 500MG 1000 MILLIGRAM(S): 500 TABLET, COATED ORAL at 00:21

## 2024-11-11 RX ADMIN — MANNITOL 180 GRAM(S): 20 INJECTION, SOLUTION INTRAVENOUS at 09:24

## 2024-11-11 RX ADMIN — POTASSIUM CHLORIDE 50 MILLIEQUIVALENT(S): 600 TABLET, EXTENDED RELEASE ORAL at 03:58

## 2024-11-11 RX ADMIN — MANNITOL 180 GRAM(S): 20 INJECTION, SOLUTION INTRAVENOUS at 02:59

## 2024-11-11 RX ADMIN — ACETAMINOPHEN 500MG 650 MILLIGRAM(S): 500 TABLET, COATED ORAL at 11:30

## 2024-11-11 RX ADMIN — ENOXAPARIN SODIUM 40 MILLIGRAM(S): 30 INJECTION SUBCUTANEOUS at 05:59

## 2024-11-11 RX ADMIN — CHLORHEXIDINE GLUCONATE 1 APPLICATION(S): 1.2 RINSE ORAL at 05:59

## 2024-11-11 RX ADMIN — ACETAMINOPHEN 500MG 650 MILLIGRAM(S): 500 TABLET, COATED ORAL at 18:30

## 2024-11-11 RX ADMIN — ACETAMINOPHEN 500MG 400 MILLIGRAM(S): 500 TABLET, COATED ORAL at 00:06

## 2024-11-11 RX ADMIN — SODIUM CHLORIDE 50 MILLILITER(S): 3 INJECTION, SOLUTION INTRAVENOUS at 09:24

## 2024-11-11 RX ADMIN — Medication 2 TABLET(S): at 21:08

## 2024-11-11 RX ADMIN — ONDANSETRON HYDROCHLORIDE 4 MILLIGRAM(S): 4 TABLET, FILM COATED ORAL at 18:09

## 2024-11-11 NOTE — OCCUPATIONAL THERAPY INITIAL EVALUATION ADULT - GENERAL OBSERVATIONS, REHAB EVAL
Pt received semi wright in bed, c/o headache, RN dispensed pain medication, +IV lock, +tele, +BP cuff, dtr at bedside, utilized  [id number], left in bed in chair mode per pt request 2* to headache, SBP within parameters throughout, RN Ngoc aware of all

## 2024-11-11 NOTE — PROGRESS NOTE ADULT - ASSESSMENT
64F. PMH: Hx of Cholecystitis s/p lap-roel, former smoker (13 pack years, quit 14 years ago). Presented 11/9 c/o dizziness nausea and vomiting since waking up from sleep. LKW 11/8 11:30PM Stroke code activated: NIHSS 0. CTH (+) for Acute infarct involving the left cerebellar hemisphere, CTP showed 5cc of penumbra in L hemisphere, with 4cc of core infarct. 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 No TNK OOW, No MT - due to low NIHSS. Admitted to stroke unit for further workup. 11/10 in AM, patient's MRI completed showing large cerebellar infarct with partial effacement of 4th ventricle and mass effect on the medulla and left cerebellar tonsillar herniation. On evaluation, patient was awake, attentive to voice though in mild distress from severe nausea. NIHSS now a 1 for dysmetria on LUE. NSICU consulted, hypertonic saline started. Patient upgraded to ICU for further monitoring. Etiology likely SHEFALI vs ESUS. Patient will be admitted to stroke unit for monitoring and further workup.      NEUROLOGY  #Stroke workup  - Stroke Code CTH/CTP/CTA: Completed and reviewed  - ASA 81mg q day/ Plavix off for now ( last Dose 11/10/24 )   - Neurochecks & Vitals q1 hrs  - Check PRU   - Fall and Aspiration precautions  - Physiatry eval/Physical therapy/Occupational therapy/Speech and Swallow  - Neurosurgery Consult - SOC/Hydro watch   - F/u HbA1c- 5.9 , TSH and Lipid panel- LDL- 66/ TSH - 1.77   - MRI Brain Non-con- as above   -  TTE w bubble study- as above / PFO  +   - Meclizine 12.5 mg prn   - hypertonic saline 3% IVF and mannitol IVPB- 50cc/hr ; goal Na 145-155; serum osm 310-320, monitor I/Os, bmp q6  - C/w Atorvastatin 80mg daily  - C/w DVT PPX as below    CARDIOLOGY- PFO   #Hypertension  - Goal -180  - hold home blood pressure medication for now  - Stroke Code EKG Results: NSR  #HLD - LDL results: 66  - high dose statin as above    PULMONOLOGY- Ex - smoker   - call provider if SPO2 < 95%    GATROENTEROLOGY  - Diet: Soft and Bite size mildly thick   - Zofran prn - N/V   - Senna / Miralax   - No GI prophylaxsis neeed    RENAL/ELECTROLYTES  - D/C horn   - Replete K<4 and Mg <2    INFECTIOUS DISEASE  - Monitor  for fever     ENDOCRINE  Stroke Core measures as above     HEME/ONC - R peroneal DVT  Lovenox 40mg q day  ASA 81 mg qday   F/U DVT in 5 Days     Dispo  - NSICU 64F. PMH: Hx of Cholecystitis s/p lap-roel, former smoker (13 pack years, quit 14 years ago). Presented 11/9 c/o dizziness nausea and vomiting since waking up from sleep. LKW 11/8 11:30PM Stroke code activated: NIHSS 0. CTH (+) for Acute infarct involving the left cerebellar hemisphere, CTP showed 5cc of penumbra in L hemisphere, with 4cc of core infarct. 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 No TNK OOW, No MT - due to low NIHSS. Admitted to stroke unit for further workup. 11/10 in AM, patient's MRI completed showing large cerebellar infarct with partial effacement of 4th ventricle and mass effect on the medulla and left cerebellar tonsillar herniation. On evaluation, patient was awake, attentive to voice though in mild distress from severe nausea. NIHSS now a 1 for dysmetria on LUE. NSICU consulted, hypertonic saline started. Patient upgraded to ICU for further monitoring. Etiology likely SHEFALI vs ESUS. Patient will be admitted to stroke unit for monitoring and further workup.      NEUROLOGY- - Etiology of  Cerebellar stroke - Non ICAD, embolic vs dissection   - Gen neuro checks q 2  - Stroke Code CTH/CTP/CTA: Completed and reviewed  - ASA 81mg q day/ Plavix off for now ( last Dose 11/10/24 )   -  PRU - 205- 11/11/24  - MRI/MRA neck  - vert artery dissection - Fat sat    - Fall and Aspiration precautions  - Physiatry eval/Physical therapy/Occupational therapy/Speech and Swallow  - Neurosurgery Consult - SOC/Hydro watch   - F/u HbA1c- 5.9 , TSH and Lipid panel- LDL- 66/ TSH - 1.77   -  TTE w bubble study- as above / PFO  +   - Meclizine 12.5 mg prn / Scopolamine  patch  1mg q three days   - hypertonic saline 3%  50cc/hr / goal Na 140-150;   - NaCl tabs 2 grams q 8   - C/w Atorvastatin 80mg daily  - C/w DVT PPX as below    CARDIOLOGY- PFO / HLD   #Hypertension  - Goal -180  -  GREGORIO and if Neg MRA /MRI fat sat will consult cardiology for PFO closure   - hold home blood pressure medication for now  - ECHO as above   - Stroke Code EKG Results: NSR  #HLD - LDL results: 66  - high dose statin as above    PULMONOLOGY- Ex - smoker   - call provider if SPO2 < 95%    GATROENTEROLOGY  - Diet: easy to chew with thin liquids   - Zofran prn - N/V   - Senna / Miralax / MOM 30cc q 8 prn   - last BM -   - No GI prophylaxsis neeed    RENAL/ELECTROLYTES  - D/C horn   - Replete K<4 and Mg <2    INFECTIOUS DISEASE  - Monitor  for fever     ENDOCRINE  Stroke Core measures as above     HEME/ONC - R peroneal DVT  Lovenox 40mg q day  ASA 81 mg qday   F/U DVT in 4 Days - 11/16    Dispo  - NSICU

## 2024-11-11 NOTE — PHYSICAL THERAPY INITIAL EVALUATION ADULT - STANDING BALANCE: STATIC
Alert and oriented to person, place and time, memory intact, behavior appropriate to situation, PERRL. fair minus

## 2024-11-11 NOTE — PHYSICAL THERAPY INITIAL EVALUATION ADULT - GAIT TRAINING, PT EVAL
will ambulate 100 ft with the least restrictive AD, CS by d/c; will negotiate 1 FOS with 1 rail and CS by d/c

## 2024-11-11 NOTE — OCCUPATIONAL THERAPY INITIAL EVALUATION ADULT - ADL RETRAINING, OT EVAL
Patient will perform upper body dressing with supervision by discharge. Patient will perform lower body dressing with supervision with use of appropriate adaptive equipment as needed by discharge.

## 2024-11-11 NOTE — PHYSICAL THERAPY INITIAL EVALUATION ADULT - PERTINENT HX OF CURRENT PROBLEM, REHAB EVAL
64F. PMH: Hx of Cholecystitis s/p lap-roel, former smoker (13 pack years, quit 14 years ago)  Presented 11/9 c/o dizziness nausea and vomiting since waking up from sleep. LKW 11/8 11:30PM. Symptoms preceded by several days of cough, body aches, generalized weakness.   Patient at baseline is independent living on her own.   Hospital Course:  Patient arrived at ED /69, breathing on room air, nontachycardic, EKG showing NSR. Stroke code activated: NIHSS 0. CTH (+) for Acute infarct involving the left cerebellar hemisphere, CTP showed 5cc of penumbra in L hemisphere, with 4cc of core infarct. 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 No TNK OOW, No MT - due to low NIHSS. Admitted to stroke unit for further workup. 11/10 in AM, patient's MRI completed showing large cerebellar infarct with partial effacement of 4th ventricle and mass effect on the medulla and left cerebellar tonsillar herniation. On evaluation, patient was awake, attentive to voice though in mild distress from severe nausea. NIHSS now a 1 for dysmetria on LUE. NSICU consulted, hypertonic saline started. Patient upgraded to ICU for further monitoring.

## 2024-11-11 NOTE — DIETITIAN INITIAL EVALUATION ADULT - PERTINENT MEDS FT
MEDICATIONS  (STANDING):  aspirin  chewable 81 milliGRAM(s) Oral daily  atorvastatin 80 milliGRAM(s) Oral at bedtime  chlorhexidine 2% Cloths 1 Application(s) Topical <User Schedule>  enoxaparin Injectable 40 milliGRAM(s) SubCutaneous every 24 hours  sodium chloride 3%. 500 milliLiter(s) (50 mL/Hr) IV Continuous <Continuous>    MEDICATIONS  (PRN):  acetaminophen     Tablet .. 650 milliGRAM(s) Oral every 6 hours PRN Temp greater or equal to 38C (100.4F), Mild Pain (1 - 3)  meclizine 12.5 milliGRAM(s) Oral every 6 hours PRN Dizziness  ondansetron Injectable 4 milliGRAM(s) IV Push three times a day PRN Nausea and/or Vomiting

## 2024-11-11 NOTE — OCCUPATIONAL THERAPY INITIAL EVALUATION ADULT - PERTINENT HX OF CURRENT PROBLEM, REHAB EVAL
64F PMHx of cholecystitis s/p lap roel, not on any medications, presents to ED for dizziness, nausea, and vomiting. LKW 11:30pm the night before. 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 Stroke code was called. NIHSS 0, still with dizziness and nausea. Patient was out of window for TNK and was deemed not a candidate for acute thrombectomy. received plavix load and admitted to stroke service now with worsening exam transferred to neurocritical care for further man agent and workup.    Hosp Day #1- L cerebellar infarct / hydro

## 2024-11-11 NOTE — DIETITIAN INITIAL EVALUATION ADULT - ADD RECOMMEND
1. Once medically feasible to advance diet order, recommend low fat modifier; texture/consistency per SLP    High risk f/u

## 2024-11-11 NOTE — OCCUPATIONAL THERAPY INITIAL EVALUATION ADULT - VISUAL ACUITY
Per davis @ 4:54p 1/17/23 -     Patient dx'd w/ strep 1/14  Given abx  Has had a slight improvement in symptoms  Still has a slight fever  Asking if she should be seen or wait it out? reports dizziness/jumpiness in vision intermittently noted with horizontal nystagmus R>L

## 2024-11-11 NOTE — OCCUPATIONAL THERAPY INITIAL EVALUATION ADULT - THERAPY FREQUENCY, OT EVAL
Encounter Date: 2022       History     Chief Complaint   Patient presents with    Shortness of Breath     Pt to ER via POV for trouble breathing.  Mom states he was eating a bottle and started choking and has continued to cough.  Started 10-15 minutes ago. Congestion today also.  Retractions noted     8-month-old male infant brought in because of cough and difficulty breathing.  Mother states that he had some mild cold symptoms and a low-grade fever yesterday.  She states that he has had increase coughing today and started having a coughing spell while he was eating and then has continued to have coughing and vomiting and not breathing normally.  Other family members have upper respiratory symptoms at this time.  He was a pre term infant delivered at 25 weeks gestation and spent 3+ months in the NICU.  Mother denies any need for breathing treatments at home.  He does not take any medications and is up-to-date on his immunizations.    Review of patient's allergies indicates:  No Known Allergies  No past medical history on file.  No past surgical history on file.  No family history on file.     Review of Systems   Constitutional:  Positive for appetite change and fever.   HENT:  Positive for congestion and rhinorrhea.    Eyes: Negative.    Respiratory:  Positive for cough. Negative for wheezing.    Cardiovascular: Negative.    Gastrointestinal:  Positive for vomiting.   Musculoskeletal: Negative.    Skin: Negative.    Allergic/Immunologic: Negative.    Neurological: Negative.    Hematological: Negative.      Physical Exam     Initial Vitals [11/09/22 1608]   BP Pulse Resp Temp SpO2   -- (!) 158 (!) 60 99.3 °F (37.4 °C) (!) 92 %      MAP       --         Physical Exam    Vitals reviewed.  Constitutional: He appears well-developed and well-nourished.   HENT:   Head: Anterior fontanelle is flat.   Right Ear: Tympanic membrane normal.   Left Ear: Tympanic membrane normal.   Mouth/Throat: Mucous membranes are moist.  Oropharynx is clear.   Eyes: Conjunctivae are normal. Pupils are equal, round, and reactive to light.   Neck:   Normal range of motion.  Cardiovascular:  Regular rhythm, S1 normal and S2 normal.   Tachycardia present.         Pulmonary/Chest: He has wheezes. He has rhonchi. He exhibits retraction.   Infant has some mild subcostal retractions.  He is having persistent coughing with episodes of vomiting.  He has some expiratory wheezing and rhonchi.   Abdominal: Abdomen is soft. Bowel sounds are normal.   Musculoskeletal:         General: Normal range of motion.      Cervical back: Normal range of motion.     Lymphadenopathy:     He has no cervical adenopathy.   Neurological: He is alert. He has normal strength.   Skin: Skin is warm and dry. Capillary refill takes less than 2 seconds. No rash noted.       ED Course   Procedures  Labs Reviewed   COVID/RSV/FLU A&B PCR - Abnormal; Notable for the following components:       Result Value    Respiratory Syncytial Virus PCR Detected (*)     All other components within normal limits    Narrative:     The Xpert Xpress SARS-CoV-2/FLU/RSV plus is a rapid, multiplexed real-time PCR test intended for the simultaneous qualitative detection and differentiation of SARS-CoV-2, Influenza A, Influenza B, and respiratory syncytial virus (RSV) viral RNA in either nasopharyngeal swab or nasal swab specimens.         CBC WITH DIFFERENTIAL - Abnormal; Notable for the following components:    MCH 26.9 (*)     IG# 0.08 (*)     All other components within normal limits   CBC W/ AUTO DIFFERENTIAL    Narrative:     The following orders were created for panel order CBC auto differential.  Procedure                               Abnormality         Status                     ---------                               -----------         ------                     CBC with Differential[[id number]]        Abnormal            Final result               Manual Differential[[id number]]                               In process                   Please view results for these tests on the individual orders.   BASIC METABOLIC PANEL   MANUAL DIFFERENTIAL          Imaging Results              X-Ray Chest PA And Lateral (Final result)  Result time 11/09/22 16:44:14      Final result by Madeleine Camarena MD (11/09/22 16:44:14)                   Impression:      No acute abnormality.      Electronically signed by: Madeleine Camarena  Date:    2022  Time:    16:44               Narrative:    EXAMINATION:  XR CHEST PA AND LATERAL    CLINICAL HISTORY:  Cough, unspecified    TECHNIQUE:  PA and lateral views of the chest were performed.    COMPARISON:  None    FINDINGS:  The lungs are clear, with normal appearance of pulmonary vasculature and no pleural effusion or pneumothorax.    The cardiac silhouette is normal in size. The hilar and mediastinal contours are unremarkable.    Bones are intact.    Visualized portion of the bowel appear unremarkable.  No free air is seen.  No free fluid is seen.  No organomegaly is seen.  No abnormal calcifications are seen.                                       Medications   albuterol nebulizer solution 2.5 mg (2.5 mg Nebulization Given 11/9/22 1630)     Medical Decision Making:   History:   I obtained history from: someone other than patient.       <> Summary of History: History obtained from mother.  Onset of upper respiratory symptoms yesterday which worsened today and was exacerbated with an episode of choking while he was eating prior to presenting to the ER.  Past history significant for prematurity 25 weeks gestation with extended [facility] stay  Initial Assessment:   8-month-old male in mild respiratory distress.  Symptoms of upper respiratory infection prior to onset difficulty breathing after choking while feeding  Differential Diagnosis:   RSV, influenza, COVID-19, aspiration, pneumonia  Clinical Tests:   Lab Tests: Ordered and Reviewed       <> Summary of Lab: RSV is positive, flu  and COVID are negative  Radiological Study: Ordered and Reviewed  ED Management:  Patient was given an albuterol neb and respiratory status improved.  He was initially placed on 1 L of oxygen by nasal cannula which is gradually being weaned.  He is RSV positive so based on his history extreme prematurity and only being in the 2nd day of the illness it is felt that he should be admitted for observation.  Other:   I have discussed this case with another health care provider.       <> Summary of the Discussion: I discussed the case with  who is on call for the pediatric group of [facility].  She agrees that because of his pre term history and symptomatology during the early phase of the illness that he should be admitted on observation.  We will do maintenance fluids and albuterol nebs Q 4.                        Clinical Impression:   Final diagnoses:  [R05.9] Cough  [J21.0] RSV (acute bronchiolitis due to respiratory syncytial virus) (Primary)      ED Disposition Condition    Observation Stable                Carin Baer MD  11/09/22 7046     2-3x/week

## 2024-11-11 NOTE — PHYSICAL THERAPY INITIAL EVALUATION ADULT - DIAGNOSIS, PT EVAL
Acute LT cerebellar CVA with mass effect on the medulla, LT cerebellar tonsillar herniation; Gait dysfunction

## 2024-11-11 NOTE — PHYSICAL THERAPY INITIAL EVALUATION ADULT - GENERAL OBSERVATIONS, REHAB EVAL
9731-6443 pm. 65 y/o RT-handed F rec'd/left in bed, nad, + tele, IV. pt is A+Ox3 in Italian, following most VC's. pt reported 8/10 HA (RN aware) and no dizziness t/o session, agreeable to a PT session. pt tolerated transfers and ambulated 2 ft x 3 with min assist, with VC's for slow and smooth mvts and transitions to prevent dizziness. vitals: in bed post amb 158/76 53 98% on RA. 7024-7137 pm. 63 y/o RT-handed F rec'd/left in bed, nad, + tele, IV. pt is A+Ox3 in Vietnamese, following most VC's. pt reported 8/10 HA (RN aware) and no dizziness t/o session, agreeable to a PT session. pt tolerated transfers and ambulated 2 ft x 3 with min assist, with VC's for slow and smooth mvts and transitions to prevent dizziness. vitals: in bed at rest 129/67 58 98% on RA, post amb 158/76 53 98% on RA.

## 2024-11-11 NOTE — OCCUPATIONAL THERAPY INITIAL EVALUATION ADULT - LEVEL OF INDEPENDENCE: BED TO CHAIR, REHAB EVAL
utilized bed in chair mode 2* to pt with headache- educated RN on safely transferring pt OOB to chair later in day if pt feels up to it

## 2024-11-11 NOTE — DIETITIAN INITIAL EVALUATION ADULT - OTHER CALCULATIONS
Using ABW: ENERGY: 3896-3908 kcal/day (20-25 kcal/kg); PROTEIN: 68-91 g/day (1.5-2 g/kg IBW 45.5 KG); FLUID: 1298-5231 mL/day (25-30 mL/kg) -- with consideration for age, BMI, acuity of illness

## 2024-11-11 NOTE — PROGRESS NOTE ADULT - SUBJECTIVE AND OBJECTIVE BOX
64F PMHx of cholecystitis s/p lap roel, not on any medications, presents to ED for dizziness, nausea, and vomiting. LKW 11:30pm the night before. 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 Stroke code was called. NIHSS 0, still with dizziness and nausea. Patient was out of window for TNK and was deemed not a candidate for acute thrombectomy. received plavix load and admitted to stroke service now with worsening exam transferred to neurocritical care for further man agent and workup.        REASON FOR CONSULT: ***    CONSULT REQUESTED BY: ***    Patient is a 64y old  Female who presents with a chief complaint of Stroke (10 Nov 2024 12:12)      BRIEF HOSPITAL COURSE: ***    Events last 24 hours: ***    PAST MEDICAL & SURGICAL HISTORY:  No pertinent past medical history      S/P cholecystectomy      H/O colonoscopy  2017        Allergies    penicillin (Hives)    Intolerances      FAMILY HISTORY:        Medications:        meclizine 12.5 milliGRAM(s) Oral every 6 hours PRN  ondansetron Injectable 4 milliGRAM(s) IV Push three times a day PRN      aspirin  chewable 81 milliGRAM(s) Oral daily  enoxaparin Injectable 40 milliGRAM(s) SubCutaneous every 24 hours        atorvastatin 80 milliGRAM(s) Oral at bedtime    sodium chloride 0.9%. 1000 milliLiter(s) IV Continuous <Continuous>  sodium chloride 3%. 500 milliLiter(s) IV Continuous <Continuous>                ICU Vital Signs Last 24 Hrs  T(C): 37.6 (10 Nov 2024 07:47), Max: 37.6 (10 Nov 2024 07:47)  T(F): 99.7 (10 Nov 2024 07:47), Max: 99.7 (10 Nov 2024 07:47)  HR: 66 (10 Nov 2024 07:47) (60 - 86)  BP: 167/78 (10 Nov 2024 07:47) (103/57 - 167/78)  BP(mean): --  ABP: --  ABP(mean): --  RR: 18 (10 Nov 2024 04:30) (18 - 18)  SpO2: 98% (10 Nov 2024 07:47) (96% - 99%)    O2 Parameters below as of 10 Nov 2024 04:30  Patient On (Oxygen Delivery Method): room air          Vital Signs Last 24 Hrs  T(C): 37.6 (10 Nov 2024 07:47), Max: 37.6 (10 Nov 2024 07:47)  T(F): 99.7 (10 Nov 2024 07:47), Max: 99.7 (10 Nov 2024 07:47)  HR: 66 (10 Nov 2024 07:47) (60 - 86)  BP: 167/78 (10 Nov 2024 07:47) (103/57 - 167/78)  BP(mean): --  RR: 18 (10 Nov 2024 04:30) (18 - 18)  SpO2: 98% (10 Nov 2024 07:47) (96% - 99%)    Parameters below as of 10 Nov 2024 04:30  Patient On (Oxygen Delivery Method): room air            I&O's Detail        LABS:                        15.0   8.85  )-----------( 204      ( 09 Nov 2024 16:25 )             45.7     11-10    140  |  109  |  11  ----------------------------<  108[H]  4.0   |  19  |  0.6[L]    Ca    8.1[L]      10 Nov 2024 08:29    TPro  6.3  /  Alb  3.8  /  TBili  0.7  /  DBili  0.2  /  AST  28  /  ALT  22  /  AlkPhos  83  11-10          CAPILLARY BLOOD GLUCOSE          Urinalysis Basic - ( 10 Nov 2024 08:29 )    Color: x / Appearance: x / SG: x / pH: x  Gluc: 108 mg/dL / Ketone: x  / Bili: x / Urobili: x   Blood: x / Protein: x / Nitrite: x   Leuk Esterase: x / RBC: x / WBC x   Sq Epi: x / Non Sq Epi: x / Bacteria: x   64F PMHx of cholecystitis s/p lap roel, not on any medications, presents to ED for dizziness, nausea, and vomiting. LKW 11:30pm the night before. 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 Stroke code was called. NIHSS 0, still with dizziness and nausea. Patient was out of window for TNK and was deemed not a candidate for acute thrombectomy. received plavix load and admitted to stroke service now with worsening exam transferred to neurocritical care for further man agent and workup.    Hosp Day #1- L cerebellar infarct         Allergies    penicillin (Hives)    Intolerances      PAST MEDICAL & SURGICAL HISTORY:  No pertinent past medical history  S/P cholecystectomy- 2019   H/O colonoscopy  2017        Allergies    penicillin (Hives)  ICU Vital Signs Last 24 Hrs  T(C): 37.2 (11 Nov 2024 12:00), Max: 38.1 (10 Nov 2024 20:30)  T(F): 98.9 (11 Nov 2024 12:00), Max: 100.5 (10 Nov 2024 20:30)  HR: 58 (11 Nov 2024 11:00) (52 - 73)  BP: 162/72 (11 Nov 2024 11:00) (122/67 - 162/72)  BP(mean): 104 (11 Nov 2024 11:00) (89 - 112)  RR: 14 (11 Nov 2024 11:00) (12 - 30)  SpO2: 98% (11 Nov 2024 11:00) (95% - 100%)    O2 Parameters below as of 11 Nov 2024 12:00  Patient On (Oxygen Delivery Method): room air      10 Nov 2024 07:01  -  11 Nov 2024 07:00  --------------------------------------------------------  IN:    IV PiggyBack: 480 mL    Oral Fluid: 360 mL    sodium chloride 3%: 30 mL    sodium chloride 3%: 550 mL  Total IN: 1420 mL    OUT:    Voided (mL): 1875 mL  Total OUT: 1875 mL    Total NET: -455 mL      11 Nov 2024 07:01  -  11 Nov 2024 12:27  --------------------------------------------------------  IN:    IV PiggyBack: 90 mL    sodium chloride 3%: 250 mL  Total IN: 340 mL    OUT:    Voided (mL): 355 mL  Total OUT: 355 mL    Total NET: -15 mL    MEDICATIONS  (STANDING):  aspirin  chewable 81 milliGRAM(s) Oral daily  atorvastatin 80 milliGRAM(s) Oral at bedtime  chlorhexidine 2% Cloths 1 Application(s) Topical <User Schedule>  enoxaparin Injectable 40 milliGRAM(s) SubCutaneous every 24 hours  sodium chloride 3%. 500 milliLiter(s) (50 mL/Hr) IV Continuous <Continuous>    MEDICATIONS  (PRN):  acetaminophen     Tablet .. 650 milliGRAM(s) Oral every 6 hours PRN Temp greater or equal to 38C (100.4F), Mild Pain (1 - 3)  meclizine 12.5 milliGRAM(s) Oral every 6 hours PRN Dizziness  ondansetron Injectable 4 milliGRAM(s) IV Push three times a day PRN Nausea and/or Vomiting    11-11    146  |  113[H]  |  10  ----------------------------<  82  3.7   |  21  |  0.6[L]    Ca    8.6      11 Nov 2024 04:26  Phos  2.7     11-11  Mg     2.2     11-11    TPro  5.8[L]  /  Alb  3.6  /  TBili  0.7  /  DBili  x   /  AST  35  /  ALT  25  /  AlkPhos  79  11-11        CT Head No Cont (11.11.24 @ 05:07) >  IMPRESSION:    Stable appearance of the large left inferior cerebellar infarct. Stable   mass effect upon the fourth ventricle and medulla. Stable ventricular   size, without significant hydrocephalus.     MR Head No Cont (11.10.24 @ 09:09) >    IMPRESSION:  Large acute infarct involving the inferomedial left cerebellum with mass   effect resulting in partial effacement of the fourth ventricle without   hydrocephalus. There is mass effect resulting in anterolateral   displacement of medulla as well as left cerebellar tonsillar herniation   through the foramen magnum.      < from: TTE Echo Complete w/ Contrast w/o Doppler (11.11.24 @ 08:21) >  Summary:   1. Left ventricular ejection fraction, by visual estimation, is 60 to   65%.   2. Normal left ventricular size and wall thicknesses, with normal   systolic and diastolic function.   3. Intravenous injection of agitated saline demonstrates the presence of   a patent foramen ovale.   4. The aortic valve was not well visualized. Peak transaortic gradient   equals 31.6 mmHg, mean transaortic gradient equals 16.3 mmHg, the   calculated aortic valve area equals 1.30 cm² by the continuity equation   consistent with moderate aortic stenosis.   5. Mild tricuspid regurgitation.   6. Estimated pulmonary artery systolic pressure is 38.5 mmHg assuming a   right atrial pressure of 8 mmHg, which is consistent with borderline   pulmonary hypertension.    < from: 12 Lead ECG (11.09.24 @ 19:30) >  Atrial Rate 85 BPM    P-R Interval 176 ms    QRS Duration 88 ms    Q-T Interval 378 ms    QTC Calculation(Bazett) 449 ms    P Axis 56 degrees    R Axis 65 degrees    T Axis 40 degrees    Diagnosis Line Normal sinus rhythm  Normal ECG    R peroneal DVT         PHYSICAL EXAM:    Constitutional: No Acute Distress     Neurological: Awake, alert oriented to person, place and time, Following Commands, PERRL, EOMI, No Gaze Preference, Face Symmetrical, Speech Fluent, No dysmetria, No ataxia, No nystagmus     Motor exam:          Upper extremity                         Delt     Bicep     Tricep    HG                                                 R         5/5        5/5        5/5       5/5                                               L          5/5        5/5        5/5       5/5          Lower extremity                        HF         KF        KE       DF         PF                                                  R        5/5        5/5        5/5       5/5         5/5                                               L         5/5        5/5       5/5       5/5          5/5                                                 Sensation: [ ] intact to light touch  [ ] decreased:     Reflexes: Deep Tendon Reflexes Intact     Pulmonary: Clear to Auscultation, No rales, No rhonchi, No wheezes     Cardiovascular: S1, S2, Regular rate and rhythm     Gastrointestinal: Soft, Non-tender, Non-distended     Extremities: No calf tenderness      64F PMHx of cholecystitis s/p lap roel, not on any medications, presents to ED for dizziness, nausea, and vomiting. LKW 11:30pm the night before. 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 Stroke code was called. NIHSS 0, still with dizziness and nausea. Patient was out of window for TNK and was deemed not a candidate for acute thrombectomy. received plavix load and admitted to stroke service now with worsening exam transferred to neurocritical care for further man agent and workup.    Hosp Day #1- L cerebellar infarct / hydro         Allergies    penicillin (Hives)    Intolerances      PAST MEDICAL & SURGICAL HISTORY:  No pertinent past medical history  S/P cholecystectomy- 2019   H/O colonoscopy  2017        Allergies    penicillin (Hives)  ICU Vital Signs Last 24 Hrs  T(C): 37.2 (11 Nov 2024 12:00), Max: 38.1 (10 Nov 2024 20:30)  T(F): 98.9 (11 Nov 2024 12:00), Max: 100.5 (10 Nov 2024 20:30)  HR: 58 (11 Nov 2024 11:00) (52 - 73)  BP: 162/72 (11 Nov 2024 11:00) (122/67 - 162/72)  BP(mean): 104 (11 Nov 2024 11:00) (89 - 112)  RR: 14 (11 Nov 2024 11:00) (12 - 30)  SpO2: 98% (11 Nov 2024 11:00) (95% - 100%)    O2 Parameters below as of 11 Nov 2024 12:00  Patient On (Oxygen Delivery Method): room air      10 Nov 2024 07:01  -  11 Nov 2024 07:00  --------------------------------------------------------  IN:    IV PiggyBack: 480 mL    Oral Fluid: 360 mL    sodium chloride 3%: 30 mL    sodium chloride 3%: 550 mL  Total IN: 1420 mL    OUT:    Voided (mL): 1875 mL  Total OUT: 1875 mL    Total NET: -455 mL      11 Nov 2024 07:01  -  11 Nov 2024 12:27  --------------------------------------------------------  IN:    IV PiggyBack: 90 mL    sodium chloride 3%: 250 mL  Total IN: 340 mL    OUT:    Voided (mL): 355 mL  Total OUT: 355 mL    Total NET: -15 mL    MEDICATIONS  (STANDING):  aspirin  chewable 81 milliGRAM(s) Oral daily  atorvastatin 80 milliGRAM(s) Oral at bedtime  chlorhexidine 2% Cloths 1 Application(s) Topical <User Schedule>  enoxaparin Injectable 40 milliGRAM(s) SubCutaneous every 24 hours  sodium chloride 3%. 500 milliLiter(s) (50 mL/Hr) IV Continuous <Continuous>    MEDICATIONS  (PRN):  acetaminophen     Tablet .. 650 milliGRAM(s) Oral every 6 hours PRN Temp greater or equal to 38C (100.4F), Mild Pain (1 - 3)  meclizine 12.5 milliGRAM(s) Oral every 6 hours PRN Dizziness  ondansetron Injectable 4 milliGRAM(s) IV Push three times a day PRN Nausea and/or Vomiting    11-11    146  |  113[H]  |  10  ----------------------------<  82  3.7   |  21  |  0.6[L]    Ca    8.6      11 Nov 2024 04:26  Phos  2.7     11-11  Mg     2.2     11-11    TPro  5.8[L]  /  Alb  3.6  /  TBili  0.7  /  DBili  x   /  AST  35  /  ALT  25  /  AlkPhos  79  11-11        CT Head No Cont (11.11.24 @ 05:07) >  IMPRESSION:    Stable appearance of the large left inferior cerebellar infarct. Stable   mass effect upon the fourth ventricle and medulla. Stable ventricular   size, without significant hydrocephalus.     MR Head No Cont (11.10.24 @ 09:09) >    IMPRESSION:  Large acute infarct involving the inferomedial left cerebellum with mass   effect resulting in partial effacement of the fourth ventricle without   hydrocephalus. There is mass effect resulting in anterolateral   displacement of medulla as well as left cerebellar tonsillar herniation   through the foramen magnum.      < from: TTE Echo Complete w/ Contrast w/o Doppler (11.11.24 @ 08:21) >  Summary:   1. Left ventricular ejection fraction, by visual estimation, is 60 to   65%.   2. Normal left ventricular size and wall thicknesses, with normal   systolic and diastolic function.   3. Intravenous injection of agitated saline demonstrates the presence of   a patent foramen ovale.   4. The aortic valve was not well visualized. Peak transaortic gradient   equals 31.6 mmHg, mean transaortic gradient equals 16.3 mmHg, the   calculated aortic valve area equals 1.30 cm² by the continuity equation   consistent with moderate aortic stenosis.   5. Mild tricuspid regurgitation.   6. Estimated pulmonary artery systolic pressure is 38.5 mmHg assuming a   right atrial pressure of 8 mmHg, which is consistent with borderline   pulmonary hypertension.    < from: 12 Lead ECG (11.09.24 @ 19:30) >  Atrial Rate 85 BPM    P-R Interval 176 ms    QRS Duration 88 ms    Q-T Interval 378 ms    QTC Calculation(Bazett) 449 ms    P Axis 56 degrees    R Axis 65 degrees    T Axis 40 degrees    Diagnosis Line Normal sinus rhythm  Normal ECG    R peroneal DVT         PHYSICAL EXAM:    Constitutional: No Acute Distress     Neurological: Awake, alert oriented to person, place and time, Following Commands, PERRL, EOMI, No Gaze Preference, Face Symmetrical, Speech Fluent, No dysmetria, No ataxia, No nystagmus     Motor exam:          Upper extremity                         Delt     Bicep     Tricep    HG                                                 R         5/5        5/5        5/5       5/5                                               L          5/5        5/5        5/5       5/5          Lower extremity                        HF         KF        KE       DF         PF                                                  R        5/5        5/5        5/5       5/5         5/5                                               L         5/5        5/5       5/5       5/5          5/5                                                 Sensation: [ ] intact to light touch  [ ] decreased:     Reflexes: Deep Tendon Reflexes Intact     Pulmonary: Clear to Auscultation, No rales, No rhonchi, No wheezes     Cardiovascular: S1, S2, Regular rate and rhythm     Gastrointestinal: Soft, Non-tender, Non-distended     Extremities: No calf tenderness      64F PMHx of cholecystitis s/p lap roel, not on any medications, presents to ED for dizziness, nausea, and vomiting. LKW 11:30pm the night before. 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 Stroke code was called. NIHSS 0, still with dizziness and nausea. Patient was out of window for TNK and was deemed not a candidate for acute thrombectomy. received plavix load and admitted to stroke service now with worsening exam transferred to neurocritical care for further man agent and workup.    Hosp Day #1- L cerebellar infarct / hydro         Allergies    penicillin (Hives)    Intolerances      PAST MEDICAL & SURGICAL HISTORY:  No pertinent past medical history  S/P cholecystectomy- 2019   H/O colonoscopy  2017        Allergies    penicillin (Hives)  ICU Vital Signs Last 24 Hrs  T(C): 37.2 (11 Nov 2024 12:00), Max: 38.1 (10 Nov 2024 20:30)  T(F): 98.9 (11 Nov 2024 12:00), Max: 100.5 (10 Nov 2024 20:30)  HR: 58 (11 Nov 2024 11:00) (52 - 73)  BP: 162/72 (11 Nov 2024 11:00) (122/67 - 162/72)  BP(mean): 104 (11 Nov 2024 11:00) (89 - 112)  RR: 14 (11 Nov 2024 11:00) (12 - 30)  SpO2: 98% (11 Nov 2024 11:00) (95% - 100%)    O2 Parameters below as of 11 Nov 2024 12:00  Patient On (Oxygen Delivery Method): room air      10 Nov 2024 07:01  -  11 Nov 2024 07:00  --------------------------------------------------------  IN:    IV PiggyBack: 480 mL    Oral Fluid: 360 mL    sodium chloride 3%: 30 mL    sodium chloride 3%: 550 mL  Total IN: 1420 mL    OUT:    Voided (mL): 1875 mL  Total OUT: 1875 mL    Total NET: -455 mL      11 Nov 2024 07:01  -  11 Nov 2024 12:27  --------------------------------------------------------  IN:    IV PiggyBack: 90 mL    sodium chloride 3%: 250 mL  Total IN: 340 mL    OUT:    Voided (mL): 355 mL  Total OUT: 355 mL    Total NET: -15 mL    MEDICATIONS  (STANDING):  aspirin  chewable 81 milliGRAM(s) Oral daily  atorvastatin 80 milliGRAM(s) Oral at bedtime  chlorhexidine 2% Cloths 1 Application(s) Topical <User Schedule>  enoxaparin Injectable 40 milliGRAM(s) SubCutaneous every 24 hours  sodium chloride 3%. 500 milliLiter(s) (50 mL/Hr) IV Continuous <Continuous>    MEDICATIONS  (PRN):  acetaminophen     Tablet .. 650 milliGRAM(s) Oral every 6 hours PRN Temp greater or equal to 38C (100.4F), Mild Pain (1 - 3)  meclizine 12.5 milliGRAM(s) Oral every 6 hours PRN Dizziness  ondansetron Injectable 4 milliGRAM(s) IV Push three times a day PRN Nausea and/or Vomiting    11-11    146  |  113[H]  |  10  ----------------------------<  82  3.7   |  21  |  0.6[L]    Ca    8.6      11 Nov 2024 04:26  Phos  2.7     11-11  Mg     2.2     11-11    TPro  5.8[L]  /  Alb  3.6  /  TBili  0.7  /  DBili  x   /  AST  35  /  ALT  25  /  AlkPhos  79  11-11    PRU - 205     CT Head No Cont (11.11.24 @ 05:07) >  IMPRESSION:    Stable appearance of the large left inferior cerebellar infarct. Stable   mass effect upon the fourth ventricle and medulla. Stable ventricular   size, without significant hydrocephalus.     MR Head No Cont (11.10.24 @ 09:09) >    IMPRESSION:  Large acute infarct involving the inferomedial left cerebellum with mass   effect resulting in partial effacement of the fourth ventricle without   hydrocephalus. There is mass effect resulting in anterolateral   displacement of medulla as well as left cerebellar tonsillar herniation   through the foramen magnum.      < from: TTE Echo Complete w/ Contrast w/o Doppler (11.11.24 @ 08:21) >  Summary:   1. Left ventricular ejection fraction, by visual estimation, is 60 to   65%.   2. Normal left ventricular size and wall thicknesses, with normal   systolic and diastolic function.   3. Intravenous injection of agitated saline demonstrates the presence of   a patent foramen ovale.   4. The aortic valve was not well visualized. Peak transaortic gradient   equals 31.6 mmHg, mean transaortic gradient equals 16.3 mmHg, the   calculated aortic valve area equals 1.30 cm² by the continuity equation   consistent with moderate aortic stenosis.   5. Mild tricuspid regurgitation.   6. Estimated pulmonary artery systolic pressure is 38.5 mmHg assuming a   right atrial pressure of 8 mmHg, which is consistent with borderline   pulmonary hypertension.    < from: 12 Lead ECG (11.09.24 @ 19:30) >  Atrial Rate 85 BPM    P-R Interval 176 ms    QRS Duration 88 ms    Q-T Interval 378 ms    QTC Calculation(Bazett) 449 ms    P Axis 56 degrees    R Axis 65 degrees    T Axis 40 degrees    Diagnosis Line Normal sinus rhythm  Normal ECG    R peroneal DVT         PHYSICAL EXAM:    Constitutional: No Acute Distress     Neurological: Awake, alert oriented to person, place and time, Following Commands, PERRL, EOMI, No Gaze Preference, Face Symmetrical, Speech Fluent, No dysmetria, No ataxia, No nystagmus     Motor exam:          Upper extremity                         Delt     Bicep     Tricep    HG                                                 R         5/5        5/5        5/5       5/5                                               L          5/5        5/5        5/5       5/5          Lower extremity                        HF         KF        KE       DF         PF                                                  R        5/5        5/5        5/5       5/5         5/5                                               L         5/5        5/5       5/5       5/5          5/5                                                 Sensation: [ ] intact to light touch  [ ] decreased:     Reflexes: Deep Tendon Reflexes Intact     Pulmonary: Clear to Auscultation, No rales, No rhonchi, No wheezes     Cardiovascular: S1, S2, Regular rate and rhythm     Gastrointestinal: Soft, Non-tender, Non-distended     Extremities: No calf tenderness      64F PMHx of cholecystitis s/p lap roel, not on any medications, presents to ED for dizziness, nausea, and vomiting. LKW 11:30pm the night before. 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 Stroke code was called. NIHSS 0, still with dizziness and nausea. Patient was out of window for TNK and was deemed not a candidate for acute thrombectomy. received plavix load and admitted to stroke service now with worsening exam transferred to neurocritical care for further man agent and workup.    Hosp Day #3- L cerebellar infarct / hydro   No events overnight         Allergies    penicillin (Hives)    Intolerances      PAST MEDICAL & SURGICAL HISTORY:  No pertinent past medical history  S/P cholecystectomy- 2019   H/O colonoscopy  2017        Allergies    penicillin (Hives)    ICU Vital Signs Last 24 Hrs  T(C): 36.9 (12 Nov 2024 08:00), Max: 37.2 (11 Nov 2024 12:00)  T(F): 98.5 (12 Nov 2024 08:00), Max: 98.9 (11 Nov 2024 12:00)  HR: 52 (12 Nov 2024 10:00) (46 - 65)  BP: 152/73 (12 Nov 2024 10:00) (129/67 - 159/67)  BP(mean): 104 (12 Nov 2024 10:00) (92 - 116)  RR: 13 (12 Nov 2024 10:00) (12 - 27)  SpO2: 98% (12 Nov 2024 10:00) (95% - 99%)    O2 Parameters below as of 12 Nov 2024 10:00  Patient On (Oxygen Delivery Method): room air        11 Nov 2024 07:01  -  12 Nov 2024 07:00  --------------------------------------------------------  IN:    IV PiggyBack: 640 mL    Oral Fluid: 240 mL    sodium chloride 3%: 1200 mL  Total IN: 2080 mL    OUT:    Indwelling Catheter - Urethral (mL): 825 mL    Voided (mL): 2210 mL  Total OUT: 3035 mL    Total NET: -955 mL + 900 - 1855       12 Nov 2024 07:01  -  12 Nov 2024 11:15  --------------------------------------------------------  IN:    IV PiggyBack: 500 mL    sodium chloride 3%: 200 mL  Total IN: 700 mL    OUT:  Total OUT: 0 mL    Total NET: 700 mL      MEDICATIONS  (STANDING):  aspirin  chewable 81 milliGRAM(s) Oral daily  atorvastatin 80 milliGRAM(s) Oral at bedtime  chlorhexidine 2% Cloths 1 Application(s) Topical <User Schedule>  enoxaparin Injectable 40 milliGRAM(s) SubCutaneous every 24 hours  polyethylene glycol 3350 17 Gram(s) Oral daily  senna 2 Tablet(s) Oral at bedtime  sodium chloride 3%. 500 milliLiter(s) (50 mL/Hr) IV Continuous <Continuous>      MEDICATIONS  (PRN):  acetaminophen     Tablet .. 650 milliGRAM(s) Oral every 6 hours PRN Temp greater or equal to 38C (100.4F), Mild Pain (1 - 3)  meclizine 12.5 milliGRAM(s) Oral every 6 hours PRN Dizziness  ondansetron Injectable 4 milliGRAM(s) IV Push three times a day PRN Nausea and/or Vomiting    11-12    141  |  109  |  7[L]  ----------------------------<  87  3.9   |  21  |  <0.5[L]    Ca    7.9[L]      12 Nov 2024 04:25- corrected 8.2   Phos  1.8     11-12  Mg     2.0     11-12    TPro  5.5[L]  /  Alb  3.5  /  TBili  0.6  /  DBili  x   /  AST  35  /  ALT  23  /  AlkPhos  76  11-12 11-11    146  |  113[H]  |  10  ----------------------------<  82  3.7   |  21  |  0.6[L]    Ca    8.6      11 Nov 2024 04:26  Phos  2.7     11-11  Mg     2.2     11-11    TPro  5.8[L]  /  Alb  3.6  /  TBili  0.7  /  DBili  x   /  AST  35  /  ALT  25  /  AlkPhos  79  11-11    PRU - 205 - 11/11/24     VA Duplex Lower Ext Vein Scan, Bilat (11.11.24 @ 09:17) >  IMPRESSION:    Right peroneal is thrombosed    No evidence of deep venous thrombosis in left lower extremity    < end of copied text >      CT Head No Cont (11.11.24 @ 05:07) >  IMPRESSION:    Stable appearance of the large left inferior cerebellar infarct. Stable   mass effect upon the fourth ventricle and medulla. Stable ventricular   size, without significant hydrocephalus.    CT Angio Neck w/ IV Cont (11.09.24 @ 19:38) >    IMPRESSION:  CT HEAD:  Acute infarct involving the left cerebellarhemisphere without evidence   for hemorrhage.    CTA HEAD/NECK:  Occluded left vertebral artery.         MR Head No Cont (11.10.24 @ 09:09) >    IMPRESSION:  Large acute infarct involving the inferomedial left cerebellum with mass   effect resulting in partial effacement of the fourth ventricle without   hydrocephalus. There is mass effect resulting in anterolateral   displacement of medulla as well as left cerebellar tonsillar herniation   through the foramen magnum.       TTE Echo Complete w/ Contrast w/o Doppler (11.11.24 @ 08:21) >  Summary:   1. Left ventricular ejection fraction, by visual estimation, is 60 to   65%.   2. Normal left ventricular size and wall thicknesses, with normal   systolic and diastolic function.   3. Intravenous injection of agitated saline demonstrates the presence of   a patent foramen ovale.   4. The aortic valve was not well visualized. Peak transaortic gradient   equals 31.6 mmHg, mean transaortic gradient equals 16.3 mmHg, the   calculated aortic valve area equals 1.30 cm² by the continuity equation   consistent with moderate aortic stenosis.   5. Mild tricuspid regurgitation.   6. Estimated pulmonary artery systolic pressure is 38.5 mmHg assuming a   right atrial pressure of 8 mmHg, which is consistent with borderline   pulmonary hypertension.    < from: 12 Lead ECG (11.09.24 @ 19:30) >  Atrial Rate 85 BPM    P-R Interval 176 ms    QRS Duration 88 ms    Q-T Interval 378 ms    QTC Calculation(Bazett) 449 ms    P Axis 56 degrees    R Axis 65 degrees    T Axis 40 degrees    Diagnosis Line Normal sinus rhythm  Normal ECG    R peroneal DVT         PHYSICAL EXAM:    Constitutional: No Acute Distress     Neurological: Awake, alert oriented to person, place and time, Following Commands, PERRL, EOMI, No Gaze Preference, Face Symmetrical, Speech Fluent, No dysmetria, No ataxia, No nystagmus     Motor exam:          Upper extremity                         Delt     Bicep     Tricep    HG                                                 R         5/5 5/5 5/5 5/5                                               L          5/5 5/5 5/5 5/5          Lower extremity                        HF         KF        KE       DF         PF                                                  R        5/5 5/5 5/5 5/5 5/5                                               L         5/5 5/5 5/5 5/5 5/5                                                 Sensation: [ ] intact to light touch  [ ] decreased:     Reflexes: Deep Tendon Reflexes Intact     Pulmonary: Clear to Auscultation, No rales, No rhonchi, No wheezes     Cardiovascular: S1, S2, Regular rate and rhythm     Gastrointestinal: Soft, Non-tender, Non-distended     Extremities: No calf tenderness

## 2024-11-11 NOTE — DIETITIAN INITIAL EVALUATION ADULT - NAME AND PHONE
Subha [phone] or TEAMS    Nutrition Interventions: Meals, snacks, supplements; Nutrition Monitoring: Diet order, PO intake, weights, labs, NFPF, body composition, BM and tolerance to medical food supplements

## 2024-11-11 NOTE — DIETITIAN INITIAL EVALUATION ADULT - OTHER INFO
Pertinent Medical Information: Per H&P, pt is a 64F PMHx of cholecystitis s/p lap roel, not on any medications, presents to ED for dizziness, nausea, and vomiting. s/p cholecystectomy; stroke code in ED called on 11/9.     Weight hx: #/73.6 KG -- checked 1 month ago per pt. Current dosing weight is 71.5 KG. 2.8% unintentional weight loss in 1 month compared to current dosing weight. Pt does not meet weight criteria for malnutrition at this time.

## 2024-11-11 NOTE — DIETITIAN INITIAL EVALUATION ADULT - ORAL INTAKE PTA/DIET HISTORY
Discussed with pt and family at bedside. Pt speaks Pashto. Family was able to translate for pt. Per family, pt had a good appetite prior to admit; consumed 3 meals daily. Pt takes a multivitamin daily. Denies drinking protein shake supplements. NKFA; denies any restrictive cultural or [employer] food preferences. No chewing or swallowing difficulties noted.

## 2024-11-11 NOTE — OCCUPATIONAL THERAPY INITIAL EVALUATION ADULT - NSOTDISCHREC_GEN_A_CORE
Pt requires assistance with ADLs and functional transfers. will benefit from acute inpatient rehab on d/c

## 2024-11-11 NOTE — DIETITIAN INITIAL EVALUATION ADULT - PERTINENT LABORATORY DATA
11-11    146  |  113[H]  |  10  ----------------------------<  82  3.7   |  21  |  0.6[L]    Ca    8.6      11 Nov 2024 04:26  Phos  2.7     11-11  Mg     2.2     11-11    TPro  5.8[L]  /  Alb  3.6  /  TBili  0.7  /  DBili  x   /  AST  35  /  ALT  25  /  AlkPhos  79  11-11

## 2024-11-11 NOTE — OCCUPATIONAL THERAPY INITIAL EVALUATION ADULT - NSOTDMEREC_GEN_A_CORE
TBA as pt progresses I, Sukhjinder Boland MD, have seen and examined the patient on the date of service.  I agree with the ISABEL's assessment as written, with exceptions or additions as noted below or in a separate note. pt with pmh of hypothyroidism is here for nausea and covid+. has had known sick contacts, now tested positive. today developed nausea. no abd pain, cp, SOB. no change with bm. on exam she is well appearing. no abd TTP. no focal breath sounds. nausea likely in setting of covid. no abd pain to suggest intraabdominal pathology. do not suspect acs without chest pain, though will screen with ecg. check for electrolyte abnormalities.

## 2024-11-12 LAB
ALBUMIN SERPL ELPH-MCNC: 3.5 G/DL — SIGNIFICANT CHANGE UP (ref 3.5–5.2)
ALP SERPL-CCNC: 76 U/L — SIGNIFICANT CHANGE UP (ref 30–115)
ALT FLD-CCNC: 23 U/L — SIGNIFICANT CHANGE UP (ref 0–41)
ANION GAP SERPL CALC-SCNC: 11 MMOL/L — SIGNIFICANT CHANGE UP (ref 7–14)
ANION GAP SERPL CALC-SCNC: 11 MMOL/L — SIGNIFICANT CHANGE UP (ref 7–14)
ANION GAP SERPL CALC-SCNC: 12 MMOL/L — SIGNIFICANT CHANGE UP (ref 7–14)
ANION GAP SERPL CALC-SCNC: 7 MMOL/L — SIGNIFICANT CHANGE UP (ref 7–14)
AST SERPL-CCNC: 35 U/L — SIGNIFICANT CHANGE UP (ref 0–41)
BILIRUB SERPL-MCNC: 0.6 MG/DL — SIGNIFICANT CHANGE UP (ref 0.2–1.2)
BUN SERPL-MCNC: 6 MG/DL — LOW (ref 10–20)
BUN SERPL-MCNC: 6 MG/DL — LOW (ref 10–20)
BUN SERPL-MCNC: 7 MG/DL — LOW (ref 10–20)
BUN SERPL-MCNC: 7 MG/DL — LOW (ref 10–20)
CALCIUM SERPL-MCNC: 7.2 MG/DL — LOW (ref 8.4–10.4)
CALCIUM SERPL-MCNC: 7.9 MG/DL — LOW (ref 8.4–10.5)
CALCIUM SERPL-MCNC: 8 MG/DL — LOW (ref 8.4–10.5)
CALCIUM SERPL-MCNC: 8.3 MG/DL — LOW (ref 8.4–10.5)
CHLORIDE SERPL-SCNC: 105 MMOL/L — SIGNIFICANT CHANGE UP (ref 98–110)
CHLORIDE SERPL-SCNC: 109 MMOL/L — SIGNIFICANT CHANGE UP (ref 98–110)
CHLORIDE SERPL-SCNC: 114 MMOL/L — HIGH (ref 98–110)
CHLORIDE SERPL-SCNC: 124 MMOL/L — HIGH (ref 98–110)
CO2 SERPL-SCNC: 21 MMOL/L — SIGNIFICANT CHANGE UP (ref 17–32)
CO2 SERPL-SCNC: 21 MMOL/L — SIGNIFICANT CHANGE UP (ref 17–32)
CO2 SERPL-SCNC: 22 MMOL/L — SIGNIFICANT CHANGE UP (ref 17–32)
CO2 SERPL-SCNC: 24 MMOL/L — SIGNIFICANT CHANGE UP (ref 17–32)
CREAT SERPL-MCNC: 0.5 MG/DL — LOW (ref 0.7–1.5)
CREAT SERPL-MCNC: <0.5 MG/DL — LOW (ref 0.7–1.5)
EGFR: 104 ML/MIN/1.73M2 — SIGNIFICANT CHANGE UP
EGFR: 110 ML/MIN/1.73M2 — SIGNIFICANT CHANGE UP
GLUCOSE SERPL-MCNC: 100 MG/DL — HIGH (ref 70–99)
GLUCOSE SERPL-MCNC: 87 MG/DL — SIGNIFICANT CHANGE UP (ref 70–99)
GLUCOSE SERPL-MCNC: 90 MG/DL — SIGNIFICANT CHANGE UP (ref 70–99)
GLUCOSE SERPL-MCNC: 91 MG/DL — SIGNIFICANT CHANGE UP (ref 70–99)
MAGNESIUM SERPL-MCNC: 2 MG/DL — SIGNIFICANT CHANGE UP (ref 1.8–2.4)
PHOSPHATE SERPL-MCNC: 1.8 MG/DL — LOW (ref 2.1–4.9)
POTASSIUM SERPL-MCNC: 3.2 MMOL/L — LOW (ref 3.5–5)
POTASSIUM SERPL-MCNC: 3.8 MMOL/L — SIGNIFICANT CHANGE UP (ref 3.5–5)
POTASSIUM SERPL-MCNC: 3.9 MMOL/L — SIGNIFICANT CHANGE UP (ref 3.5–5)
POTASSIUM SERPL-MCNC: 4.5 MMOL/L — SIGNIFICANT CHANGE UP (ref 3.5–5)
POTASSIUM SERPL-SCNC: 3.2 MMOL/L — LOW (ref 3.5–5)
POTASSIUM SERPL-SCNC: 3.8 MMOL/L — SIGNIFICANT CHANGE UP (ref 3.5–5)
POTASSIUM SERPL-SCNC: 3.9 MMOL/L — SIGNIFICANT CHANGE UP (ref 3.5–5)
POTASSIUM SERPL-SCNC: 4.5 MMOL/L — SIGNIFICANT CHANGE UP (ref 3.5–5)
PROT SERPL-MCNC: 5.5 G/DL — LOW (ref 6–8)
SODIUM SERPL-SCNC: 139 MMOL/L — SIGNIFICANT CHANGE UP (ref 135–146)
SODIUM SERPL-SCNC: 141 MMOL/L — SIGNIFICANT CHANGE UP (ref 135–146)
SODIUM SERPL-SCNC: 146 MMOL/L — SIGNIFICANT CHANGE UP (ref 135–146)
SODIUM SERPL-SCNC: 155 MMOL/L — HIGH (ref 135–146)

## 2024-11-12 PROCEDURE — 99231 SBSQ HOSP IP/OBS SF/LOW 25: CPT

## 2024-11-12 RX ORDER — SODIUM CHLORIDE 3 G/100ML
100 INJECTION, SOLUTION INTRAVENOUS ONCE
Refills: 0 | Status: COMPLETED | OUTPATIENT
Start: 2024-11-12 | End: 2024-11-12

## 2024-11-12 RX ORDER — ACETAMINOPHEN 500MG 500 MG/1
1000 TABLET, COATED ORAL ONCE
Refills: 0 | Status: COMPLETED | OUTPATIENT
Start: 2024-11-12 | End: 2024-11-12

## 2024-11-12 RX ORDER — SCOPOLAMINE 1 MG/3D
1 PATCH, EXTENDED RELEASE TRANSDERMAL
Refills: 0 | Status: DISCONTINUED | OUTPATIENT
Start: 2024-11-12 | End: 2024-11-21

## 2024-11-12 RX ORDER — POTASSIUM PHOSPHATE, MONOBASIC POTASSIUM PHOSPHATE, DIBASIC INJECTION, 236; 224 MG/ML; MG/ML
30 SOLUTION, CONCENTRATE INTRAVENOUS ONCE
Refills: 0 | Status: COMPLETED | OUTPATIENT
Start: 2024-11-12 | End: 2024-11-12

## 2024-11-12 RX ORDER — SODIUM CHLORIDE 9 MG/ML
2 INJECTION, SOLUTION INTRAMUSCULAR; INTRAVENOUS; SUBCUTANEOUS EVERY 8 HOURS
Refills: 0 | Status: DISCONTINUED | OUTPATIENT
Start: 2024-11-12 | End: 2024-11-14

## 2024-11-12 RX ADMIN — SCOPOLAMINE 1 PATCH: 1 PATCH, EXTENDED RELEASE TRANSDERMAL at 13:47

## 2024-11-12 RX ADMIN — ACETAMINOPHEN 500MG 400 MILLIGRAM(S): 500 TABLET, COATED ORAL at 06:04

## 2024-11-12 RX ADMIN — SODIUM CHLORIDE 50 MILLILITER(S): 3 INJECTION, SOLUTION INTRAVENOUS at 08:04

## 2024-11-12 RX ADMIN — POTASSIUM PHOSPHATE, MONOBASIC POTASSIUM PHOSPHATE, DIBASIC INJECTION, 83.33 MILLIMOLE(S): 236; 224 SOLUTION, CONCENTRATE INTRAVENOUS at 08:04

## 2024-11-12 RX ADMIN — Medication 30 MILLILITER(S): at 16:59

## 2024-11-12 RX ADMIN — ACETAMINOPHEN 500MG 1000 MILLIGRAM(S): 500 TABLET, COATED ORAL at 06:21

## 2024-11-12 RX ADMIN — Medication 12.5 MILLIGRAM(S): at 17:00

## 2024-11-12 RX ADMIN — ACETAMINOPHEN 500MG 650 MILLIGRAM(S): 500 TABLET, COATED ORAL at 02:08

## 2024-11-12 RX ADMIN — Medication 2 TABLET(S): at 21:40

## 2024-11-12 RX ADMIN — ACETAMINOPHEN 500MG 650 MILLIGRAM(S): 500 TABLET, COATED ORAL at 12:00

## 2024-11-12 RX ADMIN — ENOXAPARIN SODIUM 40 MILLIGRAM(S): 30 INJECTION SUBCUTANEOUS at 05:07

## 2024-11-12 RX ADMIN — SODIUM CHLORIDE 2 GRAM(S): 9 INJECTION, SOLUTION INTRAMUSCULAR; INTRAVENOUS; SUBCUTANEOUS at 21:40

## 2024-11-12 RX ADMIN — POLYETHYLENE GLYCOL 3350 17 GRAM(S): 17 POWDER, FOR SOLUTION ORAL at 11:22

## 2024-11-12 RX ADMIN — SCOPOLAMINE 1 PATCH: 1 PATCH, EXTENDED RELEASE TRANSDERMAL at 19:00

## 2024-11-12 RX ADMIN — SODIUM CHLORIDE 2 GRAM(S): 9 INJECTION, SOLUTION INTRAMUSCULAR; INTRAVENOUS; SUBCUTANEOUS at 13:47

## 2024-11-12 RX ADMIN — ACETAMINOPHEN 500MG 650 MILLIGRAM(S): 500 TABLET, COATED ORAL at 16:59

## 2024-11-12 RX ADMIN — ACETAMINOPHEN 500MG 650 MILLIGRAM(S): 500 TABLET, COATED ORAL at 17:28

## 2024-11-12 RX ADMIN — ACETAMINOPHEN 500MG 650 MILLIGRAM(S): 500 TABLET, COATED ORAL at 11:22

## 2024-11-12 RX ADMIN — CHLORHEXIDINE GLUCONATE 1 APPLICATION(S): 1.2 RINSE ORAL at 05:07

## 2024-11-12 RX ADMIN — ACETAMINOPHEN 500MG 650 MILLIGRAM(S): 500 TABLET, COATED ORAL at 02:38

## 2024-11-12 RX ADMIN — SODIUM CHLORIDE 200 MILLILITER(S): 3 INJECTION, SOLUTION INTRAVENOUS at 06:37

## 2024-11-12 RX ADMIN — Medication 80 MILLIGRAM(S): at 21:41

## 2024-11-12 RX ADMIN — Medication 81 MILLIGRAM(S): at 11:22

## 2024-11-12 RX ADMIN — Medication 200 GRAM(S): at 06:56

## 2024-11-12 NOTE — SWALLOW BEDSIDE ASSESSMENT ADULT - SLP PERTINENT HISTORY OF CURRENT PROBLEM
64F PMHx of cholecystitis s/p lap roel, not on any medications, presents to ED for dizziness, nausea, and vomiting.Found to have left cerebellar infarct on CTH and left vertebral artery occlusion on CTA in the ED. Stroke code was called. NIHSS 0, still with dizziness and nausea. Patient was out of window for TNK and was deemed not a candidate for acute thrombectomy. MRI completed showing large cerebellar infarct with partial effacement of 4th ventricle and mass effect on the medulla and left cerebellar tonsillar herniation.
64F PMHx of cholecystitis s/p lap roel, not on any medications, presents to ED for dizziness, nausea, and vomiting.Found to have left cerebellar infarct on CTH and left vertebral artery occlusion on CTA in the ED. Stroke code was called. NIHSS 0, still with dizziness and nausea. Patient was out of window for TNK and was deemed not a candidate for acute thrombectomy. MRI completed showing large cerebellar infarct with partial effacement of 4th ventricle and mass effect on the medulla and left cerebellar tonsillar herniation.

## 2024-11-12 NOTE — SWALLOW BEDSIDE ASSESSMENT ADULT - NS SPL SWALLOW CLINIC TRIAL FT
+overt s/s of penetration/aspiration w/ thin and larger consecutive sips of mildly thick, +toleration of puree, controlled sips of mildly thick and soft and bite sized solids w/o overt s/s of penetration/aspiration
+improved toleration of thin liquids and easy to chew solids w/o overt s/s of penetration/aspiration

## 2024-11-12 NOTE — CHART NOTE - NSCHARTNOTEFT_GEN_A_CORE
Cardiology team consulted for GREGORIO in the setting of cerebellar stroke  patient seen and examined at bedside  procedure explained to the patient.   no absolute CI to proceed with GREGORIO  keep NPO after MN Cardiology team consulted for GREGORIO in the setting of cerebellar stroke  patient seen and examined at bedside  procedure explained to the patient.   TTE with normal EF and presence of PFO  no absolute CI to proceed with GREGORIO  keep NPO after MN Cardiology team consulted for GREGORIO in the setting of cerebellar stroke  patient seen and examined at bedside  procedure explained to the patient.   TTE with normal EF and presence of PFO   Duplex LE venous showed peroneal vein thrombosis   no absolute CI to proceed with GREGORIO  keep NPO after MN

## 2024-11-12 NOTE — SWALLOW BEDSIDE ASSESSMENT ADULT - ASR SWALLOW ASPIRATION MONITOR
oral hygiene/position upright (90Y)
change of breathing pattern/oral hygiene/position upright (90Y)/cough/gurgly voice/fever/pneumonia/throat clearing/upper respiratory infection

## 2024-11-12 NOTE — SWALLOW BEDSIDE ASSESSMENT ADULT - SWALLOW EVAL: DIAGNOSIS
+improved toleration of thin liquids and easy to chew solids w/o overt s/s of penetration/aspiration
+overt s/s of penetration/aspiration w/ thin and larger consecutive sips of mildly thick, +toleration of puree, controlled sips of mildly thick and soft and bite sized solids w/o overt s/s of penetration/aspiration

## 2024-11-12 NOTE — SWALLOW BEDSIDE ASSESSMENT ADULT - SLP GENERAL OBSERVATIONS
Pt received in bed awake and alert on room air c/o FREY, RN provided tylenol
PT received in bed awake and alert on room air w/ daughter at b/s

## 2024-11-12 NOTE — SWALLOW BEDSIDE ASSESSMENT ADULT - CONSISTENCIES ADMINISTERED
thin liquid/mildly thick/pureed/minced & moist/soft & bite-sized/easy to chew
thin liquid/mildly thick/pureed/minced & moist/soft & bite-sized

## 2024-11-13 LAB
ALBUMIN SERPL ELPH-MCNC: 3.8 G/DL — SIGNIFICANT CHANGE UP (ref 3.5–5.2)
ALP SERPL-CCNC: 94 U/L — SIGNIFICANT CHANGE UP (ref 30–115)
ALT FLD-CCNC: 38 U/L — SIGNIFICANT CHANGE UP (ref 0–41)
ANION GAP SERPL CALC-SCNC: 11 MMOL/L — SIGNIFICANT CHANGE UP (ref 7–14)
ANION GAP SERPL CALC-SCNC: 12 MMOL/L — SIGNIFICANT CHANGE UP (ref 7–14)
ANION GAP SERPL CALC-SCNC: 8 MMOL/L — SIGNIFICANT CHANGE UP (ref 7–14)
ANION GAP SERPL CALC-SCNC: 9 MMOL/L — SIGNIFICANT CHANGE UP (ref 7–14)
AST SERPL-CCNC: 63 U/L — HIGH (ref 0–41)
BASOPHILS # BLD AUTO: 0.04 K/UL — SIGNIFICANT CHANGE UP (ref 0–0.2)
BASOPHILS NFR BLD AUTO: 0.5 % — SIGNIFICANT CHANGE UP (ref 0–1)
BILIRUB SERPL-MCNC: 0.3 MG/DL — SIGNIFICANT CHANGE UP (ref 0.2–1.2)
BUN SERPL-MCNC: 5 MG/DL — LOW (ref 10–20)
BUN SERPL-MCNC: 6 MG/DL — LOW (ref 10–20)
CALCIUM SERPL-MCNC: 7.9 MG/DL — LOW (ref 8.4–10.4)
CALCIUM SERPL-MCNC: 7.9 MG/DL — LOW (ref 8.4–10.4)
CALCIUM SERPL-MCNC: 8.1 MG/DL — LOW (ref 8.4–10.5)
CALCIUM SERPL-MCNC: 8.7 MG/DL — SIGNIFICANT CHANGE UP (ref 8.4–10.5)
CHLORIDE SERPL-SCNC: 103 MMOL/L — SIGNIFICANT CHANGE UP (ref 98–110)
CHLORIDE SERPL-SCNC: 108 MMOL/L — SIGNIFICANT CHANGE UP (ref 98–110)
CO2 SERPL-SCNC: 23 MMOL/L — SIGNIFICANT CHANGE UP (ref 17–32)
CO2 SERPL-SCNC: 24 MMOL/L — SIGNIFICANT CHANGE UP (ref 17–32)
CREAT SERPL-MCNC: 0.5 MG/DL — LOW (ref 0.7–1.5)
CREAT SERPL-MCNC: 0.6 MG/DL — LOW (ref 0.7–1.5)
EGFR: 100 ML/MIN/1.73M2 — SIGNIFICANT CHANGE UP
EGFR: 104 ML/MIN/1.73M2 — SIGNIFICANT CHANGE UP
EOSINOPHIL # BLD AUTO: 0.08 K/UL — SIGNIFICANT CHANGE UP (ref 0–0.7)
EOSINOPHIL NFR BLD AUTO: 1 % — SIGNIFICANT CHANGE UP (ref 0–8)
GLUCOSE SERPL-MCNC: 102 MG/DL — HIGH (ref 70–99)
GLUCOSE SERPL-MCNC: 104 MG/DL — HIGH (ref 70–99)
GLUCOSE SERPL-MCNC: 109 MG/DL — HIGH (ref 70–99)
GLUCOSE SERPL-MCNC: 92 MG/DL — SIGNIFICANT CHANGE UP (ref 70–99)
HCT VFR BLD CALC: 40.5 % — SIGNIFICANT CHANGE UP (ref 37–47)
HGB BLD-MCNC: 13.4 G/DL — SIGNIFICANT CHANGE UP (ref 12–16)
IMM GRANULOCYTES NFR BLD AUTO: 0.2 % — SIGNIFICANT CHANGE UP (ref 0.1–0.3)
LYMPHOCYTES # BLD AUTO: 2.11 K/UL — SIGNIFICANT CHANGE UP (ref 1.2–3.4)
LYMPHOCYTES # BLD AUTO: 26 % — SIGNIFICANT CHANGE UP (ref 20.5–51.1)
MAGNESIUM SERPL-MCNC: 1.8 MG/DL — SIGNIFICANT CHANGE UP (ref 1.8–2.4)
MCHC RBC-ENTMCNC: 27.3 PG — SIGNIFICANT CHANGE UP (ref 27–31)
MCHC RBC-ENTMCNC: 33.1 G/DL — SIGNIFICANT CHANGE UP (ref 32–37)
MCV RBC AUTO: 82.5 FL — SIGNIFICANT CHANGE UP (ref 81–99)
MONOCYTES # BLD AUTO: 0.58 K/UL — SIGNIFICANT CHANGE UP (ref 0.1–0.6)
MONOCYTES NFR BLD AUTO: 7.1 % — SIGNIFICANT CHANGE UP (ref 1.7–9.3)
NEUTROPHILS # BLD AUTO: 5.29 K/UL — SIGNIFICANT CHANGE UP (ref 1.4–6.5)
NEUTROPHILS NFR BLD AUTO: 65.2 % — SIGNIFICANT CHANGE UP (ref 42.2–75.2)
NRBC # BLD: 0 /100 WBCS — SIGNIFICANT CHANGE UP (ref 0–0)
PHOSPHATE SERPL-MCNC: 2.5 MG/DL — SIGNIFICANT CHANGE UP (ref 2.1–4.9)
PLATELET # BLD AUTO: 195 K/UL — SIGNIFICANT CHANGE UP (ref 130–400)
PMV BLD: 10.9 FL — HIGH (ref 7.4–10.4)
POTASSIUM SERPL-MCNC: 3.4 MMOL/L — LOW (ref 3.5–5)
POTASSIUM SERPL-MCNC: 3.8 MMOL/L — SIGNIFICANT CHANGE UP (ref 3.5–5)
POTASSIUM SERPL-MCNC: 3.9 MMOL/L — SIGNIFICANT CHANGE UP (ref 3.5–5)
POTASSIUM SERPL-MCNC: 4 MMOL/L — SIGNIFICANT CHANGE UP (ref 3.5–5)
POTASSIUM SERPL-SCNC: 3.4 MMOL/L — LOW (ref 3.5–5)
POTASSIUM SERPL-SCNC: 3.8 MMOL/L — SIGNIFICANT CHANGE UP (ref 3.5–5)
POTASSIUM SERPL-SCNC: 3.9 MMOL/L — SIGNIFICANT CHANGE UP (ref 3.5–5)
POTASSIUM SERPL-SCNC: 4 MMOL/L — SIGNIFICANT CHANGE UP (ref 3.5–5)
PROT SERPL-MCNC: 6.2 G/DL — SIGNIFICANT CHANGE UP (ref 6–8)
RBC # BLD: 4.91 M/UL — SIGNIFICANT CHANGE UP (ref 4.2–5.4)
RBC # FLD: 12.5 % — SIGNIFICANT CHANGE UP (ref 11.5–14.5)
SODIUM SERPL-SCNC: 137 MMOL/L — SIGNIFICANT CHANGE UP (ref 135–146)
SODIUM SERPL-SCNC: 140 MMOL/L — SIGNIFICANT CHANGE UP (ref 135–146)
SODIUM SERPL-SCNC: 140 MMOL/L — SIGNIFICANT CHANGE UP (ref 135–146)
SODIUM SERPL-SCNC: 143 MMOL/L — SIGNIFICANT CHANGE UP (ref 135–146)
WBC # BLD: 8.12 K/UL — SIGNIFICANT CHANGE UP (ref 4.8–10.8)
WBC # FLD AUTO: 8.12 K/UL — SIGNIFICANT CHANGE UP (ref 4.8–10.8)

## 2024-11-13 PROCEDURE — 99231 SBSQ HOSP IP/OBS SF/LOW 25: CPT

## 2024-11-13 PROCEDURE — 70450 CT HEAD/BRAIN W/O DYE: CPT | Mod: 26

## 2024-11-13 RX ORDER — POTASSIUM CHLORIDE 600 MG/1
40 TABLET, EXTENDED RELEASE ORAL ONCE
Refills: 0 | Status: COMPLETED | OUTPATIENT
Start: 2024-11-13 | End: 2024-11-13

## 2024-11-13 RX ORDER — POTASSIUM CHLORIDE 600 MG/1
20 TABLET, EXTENDED RELEASE ORAL ONCE
Refills: 0 | Status: COMPLETED | OUTPATIENT
Start: 2024-11-13 | End: 2024-11-13

## 2024-11-13 RX ORDER — ACETAMINOPHEN 500MG 500 MG/1
650 TABLET, COATED ORAL EVERY 6 HOURS
Refills: 0 | Status: DISCONTINUED | OUTPATIENT
Start: 2024-11-13 | End: 2024-11-13

## 2024-11-13 RX ORDER — SODIUM CHLORIDE 3 G/100ML
500 INJECTION, SOLUTION INTRAVENOUS
Refills: 0 | Status: DISCONTINUED | OUTPATIENT
Start: 2024-11-13 | End: 2024-11-16

## 2024-11-13 RX ORDER — ACETAMINOPHEN 500MG 500 MG/1
975 TABLET, COATED ORAL EVERY 8 HOURS
Refills: 0 | Status: DISCONTINUED | OUTPATIENT
Start: 2024-11-13 | End: 2024-11-15

## 2024-11-13 RX ORDER — ACETAMINOPHEN 500MG 500 MG/1
1000 TABLET, COATED ORAL ONCE
Refills: 0 | Status: DISCONTINUED | OUTPATIENT
Start: 2024-11-13 | End: 2024-11-13

## 2024-11-13 RX ADMIN — Medication 200 GRAM(S): at 09:01

## 2024-11-13 RX ADMIN — ACETAMINOPHEN 500MG 975 MILLIGRAM(S): 500 TABLET, COATED ORAL at 12:30

## 2024-11-13 RX ADMIN — Medication 2 TABLET(S): at 21:53

## 2024-11-13 RX ADMIN — SODIUM CHLORIDE 2 GRAM(S): 9 INJECTION, SOLUTION INTRAMUSCULAR; INTRAVENOUS; SUBCUTANEOUS at 12:57

## 2024-11-13 RX ADMIN — SCOPOLAMINE 1 PATCH: 1 PATCH, EXTENDED RELEASE TRANSDERMAL at 19:00

## 2024-11-13 RX ADMIN — CHLORHEXIDINE GLUCONATE 1 APPLICATION(S): 1.2 RINSE ORAL at 06:11

## 2024-11-13 RX ADMIN — SODIUM CHLORIDE 2 GRAM(S): 9 INJECTION, SOLUTION INTRAMUSCULAR; INTRAVENOUS; SUBCUTANEOUS at 06:10

## 2024-11-13 RX ADMIN — SODIUM CHLORIDE 50 MILLILITER(S): 3 INJECTION, SOLUTION INTRAVENOUS at 21:53

## 2024-11-13 RX ADMIN — POLYETHYLENE GLYCOL 3350 17 GRAM(S): 17 POWDER, FOR SOLUTION ORAL at 12:18

## 2024-11-13 RX ADMIN — Medication 25 GRAM(S): at 09:01

## 2024-11-13 RX ADMIN — ENOXAPARIN SODIUM 40 MILLIGRAM(S): 30 INJECTION SUBCUTANEOUS at 05:57

## 2024-11-13 RX ADMIN — POTASSIUM CHLORIDE 50 MILLIEQUIVALENT(S): 600 TABLET, EXTENDED RELEASE ORAL at 03:52

## 2024-11-13 RX ADMIN — Medication 81 MILLIGRAM(S): at 12:18

## 2024-11-13 RX ADMIN — SCOPOLAMINE 1 PATCH: 1 PATCH, EXTENDED RELEASE TRANSDERMAL at 07:00

## 2024-11-13 RX ADMIN — POTASSIUM CHLORIDE 40 MILLIEQUIVALENT(S): 600 TABLET, EXTENDED RELEASE ORAL at 03:51

## 2024-11-13 RX ADMIN — ACETAMINOPHEN 500MG 975 MILLIGRAM(S): 500 TABLET, COATED ORAL at 12:16

## 2024-11-13 RX ADMIN — ACETAMINOPHEN 500MG 650 MILLIGRAM(S): 500 TABLET, COATED ORAL at 02:59

## 2024-11-13 RX ADMIN — SODIUM CHLORIDE 2 GRAM(S): 9 INJECTION, SOLUTION INTRAMUSCULAR; INTRAVENOUS; SUBCUTANEOUS at 21:53

## 2024-11-13 RX ADMIN — Medication 80 MILLIGRAM(S): at 21:53

## 2024-11-13 RX ADMIN — ACETAMINOPHEN 500MG 650 MILLIGRAM(S): 500 TABLET, COATED ORAL at 02:29

## 2024-11-13 NOTE — PHARMACOTHERAPY INTERVENTION NOTE - COMMENTS
APAP 1g IV x1, pt on diet, d/w NCC, changed to 975mg po q8h prp 4-6, active order for acetaminophen 650mg po q6h prp 1-3 & prn T  d/w RN total acetaminophen 4 g/day

## 2024-11-13 NOTE — PROGRESS NOTE ADULT - ASSESSMENT
64F. PMH: Hx of Cholecystitis s/p lap-roel, former smoker (13 pack years, quit 14 years ago). Presented 11/9 c/o dizziness nausea and vomiting since waking up from sleep. LKW 11/8 11:30PM Stroke code activated: NIHSS 0. CTH (+) for Acute infarct involving the left cerebellar hemisphere, CTP showed 5cc of penumbra in L hemisphere, with 4cc of core infarct. 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 No TNK OOW, No MT - due to low NIHSS. Admitted to stroke unit for further workup. 11/10 in AM, patient's MRI completed showing large cerebellar infarct with partial effacement of 4th ventricle and mass effect on the medulla and left cerebellar tonsillar herniation. On evaluation, patient was awake, attentive to voice though in mild distress from severe nausea. NIHSS now a 1 for dysmetria on LUE. NSICU consulted, hypertonic saline started. Patient upgraded to ICU for further monitoring. Etiology likely SHEFALI vs ESUS. Patient will be admitted to stroke unit for monitoring and further workup.      NEUROLOGY- - Etiology of  Cerebellar stroke - Non ICAD, embolic vs dissection   - Gen neuro checks q 2  - Stroke Code CTH/CTP/CTA: Completed and reviewed  - ASA 81mg q day/ Plavix off for now ( last Dose 11/10/24 )   -  PRU - 205- 11/11/24  - MRI/MRA neck  - vert artery dissection - Fat sat    - Fall and Aspiration precautions  - Physiatry eval/Physical therapy/Occupational therapy/Speech and Swallow  - Neurosurgery Consult - SOC/Hydro watch   - F/u HbA1c- 5.9 , TSH and Lipid panel- LDL- 66/ TSH - 1.77   -  TTE w bubble study- as above / PFO  +   - Meclizine 12.5 mg prn / Scopolamine  patch  1mg q three days   - hypertonic saline 3%  50cc/hr / goal Na 140-150;   - NaCl tabs 2 grams q 8   - C/w Atorvastatin 80mg daily  - C/w DVT PPX as below    CARDIOLOGY- PFO / HLD   #Hypertension  - Goal -160  -  GREGORIO and if Neg MRA /MRI fat sat will consult cardiology for PFO closure   - hold home blood pressure medication for now  - Stroke Code EKG Results: NSR  #HLD - LDL results: 66  - high dose statin as above    PULMONOLOGY- Ex - smoker   - call provider if SPO2 < 95%    GATROENTEROLOGY  - Diet: NPO after MN   - Zofran prn - N/V   - Senna / Miralax / MOM 30cc q 8 prn   - last BM -  11/13/24   - No GI prophylaxsis neeed    RENAL/ELECTROLYTES  - D/C horn   Na goal 140- 150 for cerebral edema   - Replete K<4 and Mg <2    INFECTIOUS DISEASE  - Monitor  for fever     ENDOCRINE  Stroke Core measures as above     HEME/ONC - R peroneal DVT  Lovenox 40mg q day  ASA 81 mg qday   F/U DVT in 4 Days - 11/16    Dispo  - NSICU

## 2024-11-13 NOTE — PROGRESS NOTE ADULT - SUBJECTIVE AND OBJECTIVE BOX
64F PMHx of cholecystitis s/p lap roel, not on any medications, presents to ED for dizziness, nausea, and vomiting. LKW 11:30pm the night before. 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 Stroke code was called. NIHSS 0, still with dizziness and nausea. Patient was out of window for TNK and was deemed not a candidate for acute thrombectomy. received plavix load and admitted to stroke service now with worsening exam transferred to neurocritical care for further man agent and workup.    Hosp Day #4-  L cerebellar infarct / hydro   No events overnight         Allergies    penicillin (Hives)    Intolerances      PAST MEDICAL & SURGICAL HISTORY:  No pertinent past medical history  S/P cholecystectomy- 2019   H/O colonoscopy  2017        Allergies    penicillin (Hives)    ICU Vital Signs Last 24 Hrs  T(C): 36.6 (13 Nov 2024 08:00), Max: 36.7 (12 Nov 2024 12:00)  T(F): 97.8 (13 Nov 2024 08:00), Max: 98.1 (12 Nov 2024 12:00)  HR: 62 (13 Nov 2024 11:00) (53 - 68)  BP: 146/79 (13 Nov 2024 11:00) (119/62 - 163/74)  BP(mean): 105 (13 Nov 2024 11:00) (85 - 110)  RR: 27 (13 Nov 2024 11:00) (12 - 27)  SpO2: 97% (13 Nov 2024 11:00) (92% - 98%)    O2 Parameters below as of 13 Nov 2024 04:00  Patient On (Oxygen Delivery Method): room air        12 Nov 2024 07:01  -  13 Nov 2024 07:00  --------------------------------------------------------  IN:    IV PiggyBack: 600 mL    Oral Fluid: 240 mL    sodium chloride 3%: 1140 mL  Total IN: 1980 mL    OUT:    Voided (mL): 4300 mL  Total OUT: 4300 mL    Total NET: -2320 mL    MEDICATIONS  (STANDING):  acetaminophen   IVPB .. 1000 milliGRAM(s) IV Intermittent once  aspirin  chewable 81 milliGRAM(s) Oral daily  atorvastatin 80 milliGRAM(s) Oral at bedtime  chlorhexidine 2% Cloths 1 Application(s) Topical <User Schedule>  enoxaparin Injectable 40 milliGRAM(s) SubCutaneous every 24 hours  polyethylene glycol 3350 17 Gram(s) Oral daily  scopolamine 1 mG/72 Hr(s) Patch 1 Patch Transdermal every 72 hours  senna 2 Tablet(s) Oral at bedtime  sodium chloride 2 Gram(s) Oral every 8 hours  sodium chloride 3%. 500 milliLiter(s) (40 mL/Hr) IV Continuous <Continuous>      MEDICATIONS  (PRN):  acetaminophen     Tablet .. 650 milliGRAM(s) Oral every 6 hours PRN Temp greater or equal to 38C (100.4F), Mild Pain (1 - 3)  meclizine 12.5 milliGRAM(s) Oral every 6 hours PRN Dizziness  ondansetron Injectable 4 milliGRAM(s) IV Push three times a day PRN Nausea and/or Vomiting    11-13    140  |  108  |  6[L]  ----------------------------<  104[H]  3.8   |  23  |  0.5[L]    Ca    7.9[L]      13 Nov 2024 04:32  Phos  2.5     11-13  Mg     1.8     11-13    TPro  6.2  /  Alb  3.8  /  TBili  0.3  /  DBili  x   /  AST  63[H]  /  ALT  38  /  AlkPhos  94  11-13 11-11    146  |  113[H]  |  10  ----------------------------<  82  3.7   |  21  |  0.6[L]    Ca    8.6      11 Nov 2024 04:26  Phos  2.7     11-11  Mg     2.2     11-11    TPro  5.8[L]  /  Alb  3.6  /  TBili  0.7  /  DBili  x   /  AST  35  /  ALT  25  /  AlkPhos  79  11-11    PRU - 205 - 11/11/24     VA Duplex Lower Ext Vein Scan, Bilat (11.11.24 @ 09:17) >  IMPRESSION:    Right peroneal is thrombosed    No evidence of deep venous thrombosis in left lower extremity    < end of copied text >      CT Head No Cont (11.11.24 @ 05:07) >  IMPRESSION:    Stable appearance of the large left inferior cerebellar infarct. Stable   mass effect upon the fourth ventricle and medulla. Stable ventricular   size, without significant hydrocephalus.    CT Angio Neck w/ IV Cont (11.09.24 @ 19:38) >    IMPRESSION:  CT HEAD:  Acute infarct involving the left cerebellarhemisphere without evidence   for hemorrhage.    CTA HEAD/NECK:  Occluded left vertebral artery.        MR Head No Cont (11.10.24 @ 09:09) >    IMPRESSION:  Large acute infarct involving the inferomedial left cerebellum with mass   effect resulting in partial effacement of the fourth ventricle without   hydrocephalus. There is mass effect resulting in anterolateral   displacement of medulla as well as left cerebellar tonsillar herniation   through the foramen magnum.       TTE Echo Complete w/ Contrast w/o Doppler (11.11.24 @ 08:21) >  Summary:   1. Left ventricular ejection fraction, by visual estimation, is 60 to   65%.   2. Normal left ventricular size and wall thicknesses, with normal   systolic and diastolic function.   3. Intravenous injection of agitated saline demonstrates the presence of   a patent foramen ovale.   4. The aortic valve was not well visualized. Peak transaortic gradient   equals 31.6 mmHg, mean transaortic gradient equals 16.3 mmHg, the   calculated aortic valve area equals 1.30 cm² by the continuity equation   consistent with moderate aortic stenosis.   5. Mild tricuspid regurgitation.   6. Estimated pulmonary artery systolic pressure is 38.5 mmHg assuming a   right atrial pressure of 8 mmHg, which is consistent with borderline   pulmonary hypertension.     12 Lead ECG (11.09.24 @ 19:30) >  Atrial Rate 85 BPM    P-R Interval 176 ms    QRS Duration 88 ms    Q-T Interval 378 ms    QTC Calculation(Bazett) 449 ms    P Axis 56 degrees    R Axis 65 degrees    T Axis 40 degrees    Diagnosis Line Normal sinus rhythm  Normal ECG    R peroneal DVT         PHYSICAL EXAM:    Constitutional: No Acute Distress     Neurological: Awake, alert oriented to person, place and time, Following Commands, PERRL, EOMI, No Gaze Preference, Face Symmetrical, Speech Fluent, No dysmetria, No ataxia, No nystagmus     Motor exam:          Upper extremity                         Delt     Bicep     Tricep    HG                                                 R         RUE drift 5/5                                               L          5/5        5/5        5/5       5/5          Lower extremity                        HF         KF        KE       DF         PF                                                  R        5/5 5/5 5/5 5/5 5/5                                               L         5/5 5/5 5/5 5/5 5/5                                                 Sensation: [X ] intact to light touch      Reflexes: Deep Tendon Reflexes Intact     Pulmonary: Clear to Auscultation, No rales, No rhonchi, No wheezes     Cardiovascular: S1, S2, Regular rate and rhythm     Gastrointestinal: Soft, Non-tender, Non-distended     Extremities: No calf tenderness

## 2024-11-14 ENCOUNTER — RESULT REVIEW (OUTPATIENT)
Age: 64
End: 2024-11-14

## 2024-11-14 LAB
ALBUMIN SERPL ELPH-MCNC: 3.4 G/DL — LOW (ref 3.5–5.2)
ALP SERPL-CCNC: 95 U/L — SIGNIFICANT CHANGE UP (ref 30–115)
ALT FLD-CCNC: 45 U/L — HIGH (ref 0–41)
ANION GAP SERPL CALC-SCNC: 10 MMOL/L — SIGNIFICANT CHANGE UP (ref 7–14)
ANION GAP SERPL CALC-SCNC: 8 MMOL/L — SIGNIFICANT CHANGE UP (ref 7–14)
ANION GAP SERPL CALC-SCNC: 9 MMOL/L — SIGNIFICANT CHANGE UP (ref 7–14)
APTT BLD: 28.4 SEC — SIGNIFICANT CHANGE UP (ref 27–39.2)
AST SERPL-CCNC: 62 U/L — HIGH (ref 0–41)
BILIRUB SERPL-MCNC: 0.4 MG/DL — SIGNIFICANT CHANGE UP (ref 0.2–1.2)
BUN SERPL-MCNC: 6 MG/DL — LOW (ref 10–20)
BUN SERPL-MCNC: 6 MG/DL — LOW (ref 10–20)
BUN SERPL-MCNC: 8 MG/DL — LOW (ref 10–20)
CALCIUM SERPL-MCNC: 8.2 MG/DL — LOW (ref 8.4–10.5)
CHLORIDE SERPL-SCNC: 106 MMOL/L — SIGNIFICANT CHANGE UP (ref 98–110)
CHLORIDE SERPL-SCNC: 110 MMOL/L — SIGNIFICANT CHANGE UP (ref 98–110)
CHLORIDE SERPL-SCNC: 110 MMOL/L — SIGNIFICANT CHANGE UP (ref 98–110)
CO2 SERPL-SCNC: 24 MMOL/L — SIGNIFICANT CHANGE UP (ref 17–32)
CO2 SERPL-SCNC: 24 MMOL/L — SIGNIFICANT CHANGE UP (ref 17–32)
CO2 SERPL-SCNC: 26 MMOL/L — SIGNIFICANT CHANGE UP (ref 17–32)
CREAT SERPL-MCNC: 0.5 MG/DL — LOW (ref 0.7–1.5)
CREAT SERPL-MCNC: 0.5 MG/DL — LOW (ref 0.7–1.5)
CREAT SERPL-MCNC: 0.6 MG/DL — LOW (ref 0.7–1.5)
EGFR: 100 ML/MIN/1.73M2 — SIGNIFICANT CHANGE UP
EGFR: 104 ML/MIN/1.73M2 — SIGNIFICANT CHANGE UP
EGFR: 104 ML/MIN/1.73M2 — SIGNIFICANT CHANGE UP
GLUCOSE SERPL-MCNC: 109 MG/DL — HIGH (ref 70–99)
GLUCOSE SERPL-MCNC: 112 MG/DL — HIGH (ref 70–99)
GLUCOSE SERPL-MCNC: 92 MG/DL — SIGNIFICANT CHANGE UP (ref 70–99)
INR BLD: 1.06 RATIO — SIGNIFICANT CHANGE UP (ref 0.65–1.3)
MAGNESIUM SERPL-MCNC: 2 MG/DL — SIGNIFICANT CHANGE UP (ref 1.8–2.4)
PHOSPHATE SERPL-MCNC: 3 MG/DL — SIGNIFICANT CHANGE UP (ref 2.1–4.9)
POTASSIUM SERPL-MCNC: 3.4 MMOL/L — LOW (ref 3.5–5)
POTASSIUM SERPL-MCNC: 3.9 MMOL/L — SIGNIFICANT CHANGE UP (ref 3.5–5)
POTASSIUM SERPL-MCNC: 4 MMOL/L — SIGNIFICANT CHANGE UP (ref 3.5–5)
POTASSIUM SERPL-SCNC: 3.4 MMOL/L — LOW (ref 3.5–5)
POTASSIUM SERPL-SCNC: 3.9 MMOL/L — SIGNIFICANT CHANGE UP (ref 3.5–5)
POTASSIUM SERPL-SCNC: 4 MMOL/L — SIGNIFICANT CHANGE UP (ref 3.5–5)
PROT SERPL-MCNC: 5.7 G/DL — LOW (ref 6–8)
PROTHROM AB SERPL-ACNC: 12.5 SEC — SIGNIFICANT CHANGE UP (ref 9.95–12.87)
SODIUM SERPL-SCNC: 140 MMOL/L — SIGNIFICANT CHANGE UP (ref 135–146)
SODIUM SERPL-SCNC: 143 MMOL/L — SIGNIFICANT CHANGE UP (ref 135–146)
SODIUM SERPL-SCNC: 144 MMOL/L — SIGNIFICANT CHANGE UP (ref 135–146)

## 2024-11-14 PROCEDURE — 93320 DOPPLER ECHO COMPLETE: CPT | Mod: 26

## 2024-11-14 PROCEDURE — 93325 DOPPLER ECHO COLOR FLOW MAPG: CPT | Mod: 26

## 2024-11-14 PROCEDURE — 93312 ECHO TRANSESOPHAGEAL: CPT | Mod: 26,XU

## 2024-11-14 PROCEDURE — 70549 MR ANGIOGRAPH NECK W/O&W/DYE: CPT | Mod: 26

## 2024-11-14 PROCEDURE — 99231 SBSQ HOSP IP/OBS SF/LOW 25: CPT

## 2024-11-14 RX ORDER — POTASSIUM CHLORIDE 600 MG/1
40 TABLET, EXTENDED RELEASE ORAL ONCE
Refills: 0 | Status: DISCONTINUED | OUTPATIENT
Start: 2024-11-14 | End: 2024-11-14

## 2024-11-14 RX ORDER — POTASSIUM CHLORIDE 600 MG/1
20 TABLET, EXTENDED RELEASE ORAL ONCE
Refills: 0 | Status: DISCONTINUED | OUTPATIENT
Start: 2024-11-14 | End: 2024-11-14

## 2024-11-14 RX ORDER — SODIUM CHLORIDE 9 MG/ML
2 INJECTION, SOLUTION INTRAMUSCULAR; INTRAVENOUS; SUBCUTANEOUS EVERY 6 HOURS
Refills: 0 | Status: DISCONTINUED | OUTPATIENT
Start: 2024-11-14 | End: 2024-11-15

## 2024-11-14 RX ORDER — POTASSIUM CHLORIDE 600 MG/1
20 TABLET, EXTENDED RELEASE ORAL
Refills: 0 | Status: COMPLETED | OUTPATIENT
Start: 2024-11-14 | End: 2024-11-14

## 2024-11-14 RX ADMIN — POTASSIUM CHLORIDE 50 MILLIEQUIVALENT(S): 600 TABLET, EXTENDED RELEASE ORAL at 10:53

## 2024-11-14 RX ADMIN — SODIUM CHLORIDE 2 GRAM(S): 9 INJECTION, SOLUTION INTRAMUSCULAR; INTRAVENOUS; SUBCUTANEOUS at 23:06

## 2024-11-14 RX ADMIN — SODIUM CHLORIDE 2 GRAM(S): 9 INJECTION, SOLUTION INTRAMUSCULAR; INTRAVENOUS; SUBCUTANEOUS at 05:54

## 2024-11-14 RX ADMIN — POTASSIUM CHLORIDE 50 MILLIEQUIVALENT(S): 600 TABLET, EXTENDED RELEASE ORAL at 08:47

## 2024-11-14 RX ADMIN — POLYETHYLENE GLYCOL 3350 17 GRAM(S): 17 POWDER, FOR SOLUTION ORAL at 13:34

## 2024-11-14 RX ADMIN — SODIUM CHLORIDE 2 GRAM(S): 9 INJECTION, SOLUTION INTRAMUSCULAR; INTRAVENOUS; SUBCUTANEOUS at 17:17

## 2024-11-14 RX ADMIN — Medication 81 MILLIGRAM(S): at 13:34

## 2024-11-14 RX ADMIN — SCOPOLAMINE 1 PATCH: 1 PATCH, EXTENDED RELEASE TRANSDERMAL at 07:30

## 2024-11-14 RX ADMIN — SODIUM CHLORIDE 2 GRAM(S): 9 INJECTION, SOLUTION INTRAMUSCULAR; INTRAVENOUS; SUBCUTANEOUS at 13:34

## 2024-11-14 RX ADMIN — SODIUM CHLORIDE 50 MILLILITER(S): 3 INJECTION, SOLUTION INTRAVENOUS at 13:59

## 2024-11-14 RX ADMIN — SODIUM CHLORIDE 30 MILLILITER(S): 3 INJECTION, SOLUTION INTRAVENOUS at 16:00

## 2024-11-14 RX ADMIN — Medication 2 TABLET(S): at 23:06

## 2024-11-14 RX ADMIN — ENOXAPARIN SODIUM 40 MILLIGRAM(S): 30 INJECTION SUBCUTANEOUS at 05:54

## 2024-11-14 RX ADMIN — CHLORHEXIDINE GLUCONATE 1 APPLICATION(S): 1.2 RINSE ORAL at 05:55

## 2024-11-14 RX ADMIN — Medication 40 MILLIGRAM(S): at 23:06

## 2024-11-14 NOTE — PROGRESS NOTE ADULT - ASSESSMENT
64F. PMH: Hx of Cholecystitis s/p lap-roel, former smoker (13 pack years, quit 14 years ago). Presented 11/9 c/o dizziness nausea and vomiting since waking up from sleep. LKW 11/8 11:30PM Stroke code activated: NIHSS 0. CTH (+) for Acute infarct involving the left cerebellar hemisphere, CTP showed 5cc of penumbra in L hemisphere, with 4cc of core infarct. 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 No TNK OOW, No MT - due to low NIHSS. Admitted to stroke unit for further workup. 11/10 in AM, patient's MRI completed showing large cerebellar infarct with partial effacement of 4th ventricle and mass effect on the medulla and left cerebellar tonsillar herniation. On evaluation, patient was awake, attentive to voice though in mild distress from severe nausea. NIHSS now a 1 for dysmetria on LUE. NSICU consulted, hypertonic saline started. Patient upgraded to ICU for further monitoring. Etiology likely SHEFALI vs ESUS. Patient will be admitted to stroke unit for monitoring and further workup.      NEUROLOGY- - Etiology of  Cerebellar stroke - Non ICAD, embolic vs dissection   - Gen neuro checks q 2  - Stroke Code CTH/CTP/CTA: Completed and reviewed  - ASA 81mg q day/ Plavix off for now ( last Dose 11/10/24 )   -  PRU - 205- 11/11/24  - MRI/MRA neck  - vert artery dissection - Fat sat    - Fall and Aspiration precautions  - Physiatry eval/Physical therapy/Occupational therapy/Speech and Swallow  - Neurosurgery Consult - SOC/Hydro watch   - F/u HbA1c- 5.9 , TSH and Lipid panel- LDL- 66/ TSH - 1.77   -  TTE w bubble study- as above / PFO  +   - Meclizine 12.5 mg prn / Scopolamine  patch  1mg q three days   - hypertonic saline 3%  50cc/hr / goal Na 140-150;   - NaCl tabs 2 grams q 8   - C/w Atorvastatin 80mg daily  - C/w DVT PPX as below    CARDIOLOGY- PFO / HLD   #Hypertension  - Goal -160  -  GREGORIO and if Neg MRA /MRI fat sat will consult cardiology for PFO closure   - hold home blood pressure medication for now  - Stroke Code EKG Results: NSR  #HLD - LDL results: 66  - high dose statin as above    PULMONOLOGY- Ex - smoker   - call provider if SPO2 < 95%    GATROENTEROLOGY  - Diet: NPO after MN for GREGORIO   - Zofran prn - N/V   - Senna / Miralax / MOM 30cc q 8 prn   - last BM -  11/13/24   - No GI prophylaxsis neeed    RENAL/ELECTROLYTES  - D/C horn    Na Cl tabs  2 grams q 6   Na goal 140- 150 for cerebral edema   - Replete K<4 and Mg <2    INFECTIOUS DISEASE  - Monitor  for fever     ENDOCRINE  Stroke Core measures as above     HEME/ONC - R peroneal DVT  Lovenox 40mg q day  ASA 81 mg qday   F/U DVT in 4 Days - 11/16    Dispo  - NSICU

## 2024-11-14 NOTE — CHART NOTE - NSCHARTNOTEFT_GEN_A_CORE
PACU ANESTHESIA ADMISSION NOTE      Procedure: GREGORIO  Post op diagnosis:  CVA    ____  Intubated  TV:______       Rate: ______      FiO2: ______    __x__  Patent Airway    _x___  Full return of protective reflexes    ___x_  Full recovery from anesthesia / back to baseline     Vitals:   T: 97          R:  16                BP: 103/67                 Sat: 100                  P: 81      Mental Status:  _x___ Awake   __x___ Alert   _____ Drowsy   _____ Sedated    Nausea/Vomiting:  __x__ NO  ______Yes,   See Post - Op Orders          Pain Scale (0-10):  ___0__    Treatment: ____ None    ___x_ See Post - Op/PCA Orders    Post - Operative Fluids:   ____ Oral   __x__ See Post - Op Orders    Plan: Discharge:   ____Home       _____Floor     _x____Critical Care    _____  Other:_________________    Comments:

## 2024-11-14 NOTE — CHART NOTE - NSCHARTNOTEFT_GEN_A_CORE
POST OPERATIVE PROCEDURAL DOCUMENTATION  PRE-OP DIAGNOSIS: CVA, Possible PFO    PROCEDURE: Transesophageal Echocardiogram     Primary Physician: Dr. Maloney  Cardiology Fellow: Effie    Anesthesia:  Sedation as per documentation from anesthesia    CONDITION  [  ] Critical  [  ] Serious  [  ] Fair  [ x ] Good      ESTIMATED BLOOD LOSS: None    COMPLICATIONS: None    After risks and benefits of procedures were explained, informed consent was obtained and placed in chart.  Sedation as per documentation from anesthesia. The GREGORIO probe was passed into the esophagus without difficulty.  Transesophageal and transgastric images were obtained.  The GREGORIO probe was removed without difficulty and examined.  There was no evidence for bleeding.  The patient tolerated the procedure well without any immediate GREGORIO-related complications.      Preliminary Findings:  LA: Mildly enlarged  SHEFALI: Left atrial appendage was clear of clot and smoke. Normal doppler velocities   LV: LVEF was estimated at 55-65%  MV: Trace MR, No MS  AV: Bicuspid aortic valve; no AI, mild  AS  RV: Normal size and function  TV: Trace TR  PV: No ID, no PS  IAS: NO PFO or ASD; no R to L shunting visualized by color doppler or injection agitated saline  Aorta: There was mild, non-mobile atheroma seen in the thoracic aorta.    Full report to follow    PLAN OF CARE:  [x] Return to inpatient bed when stable and fully awake  [x] No eating or drinking for 1 hour    Results of procedure/ plan of care discussed with patient/  in detail. POST OPERATIVE PROCEDURAL DOCUMENTATION  PRE-OP DIAGNOSIS: CVA, Possible PFO    PROCEDURE: Transesophageal Echocardiogram     Primary Physician: Dr. Maloney  Cardiology Fellow: Effie    Anesthesia:  Sedation as per documentation from anesthesia    CONDITION:    [  ] Critical  [  ] Serious  [  ] Fair  [ x ] Good      ESTIMATED BLOOD LOSS: None    COMPLICATIONS: None    After risks and benefits of procedures were explained, informed consent was obtained and placed in chart.  Sedation as per documentation from anesthesia. The GREGORIO probe was passed into the esophagus without difficulty.  Transesophageal and transgastric images were obtained.  The GREGORIO probe was removed without difficulty and examined.  There was no evidence for bleeding.  The patient tolerated the procedure well without any immediate GREGORIO-related complications.      Preliminary Findings:  LA: Mildly enlarged  SHEFALI: Left atrial appendage was clear of clot and smoke. Normal doppler velocities   LV: LVEF was estimated at 55-65%  MV: Trace MR, No MS  AV: Bicuspid aortic valve; no AI, mild  AS  RV: Normal size and function  TV: Trace TR  PV: No WV, no PS  IAS: NO PFO or ASD; no R to L shunting visualized by color doppler or injection agitated saline  Aorta: There was mild, non-mobile atheroma seen in the thoracic aorta.    Full report to follow    PLAN OF CARE:  [x] Return to inpatient bed when stable and fully awake  [x] No eating or drinking for 1 hour    Results of procedure/ plan of care discussed with patient/  in detail.

## 2024-11-14 NOTE — SPEECH LANGUAGE PATHOLOGY EVALUATION - COMMENTS
Bilingual SLP served as  during assessment SLP will plan to f/u WFL Preserved expressive language Did not test Preserved language processing

## 2024-11-14 NOTE — CHART NOTE - NSCHARTNOTEFT_GEN_A_CORE
Pre Procedure Note:  Patient Name: MARY FUENTES   Age:64y  MRN: [id number]  Location: [address]  Procedure Service:  Cardiology   Procedure Name: GREGORIO	    Plan: 	  GREGORIO Procedure Candidate	X YES          Day of Procedure Attestation:  I have personally seen, examined, and participated in the care of this patient. I have reviewed all pertinent information, including history, physical exam, sedation plan, and agree except as noted.    Attestation Statements:  I have personally seen and examined the patient.  I fully participated in the care of this patient.  I have made amendments to the documentation where necessary, and agree with the history, physical exam, and plan as documented by the Fellow. Pre Procedure Note:  Patient Name: MARY FUENTES   Age:64y  MRN: [id number]  Location: [address]  Procedure Service:  Cardiology   Procedure Name: GREGORIO	    Plan: 	    GREGORIO Procedure Candidate	X YES          Day of Procedure Attestation:  I have personally seen, examined, and participated in the care of this patient. I have reviewed all pertinent information, including history, physical exam, sedation plan, and agree except as noted.    Attestation Statements:  I have personally seen and examined the patient.  I fully participated in the care of this patient.  I have made amendments to the documentation where necessary, and agree with the history, physical exam, and plan as documented by the Fellow.

## 2024-11-14 NOTE — SPEECH LANGUAGE PATHOLOGY EVALUATION - SLP PERTINENT HISTORY OF CURRENT PROBLEM
65yo Female patient with PMHx of cholecystitis s/p lap roel, not on any medications. 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 Stroke code was called. NIHSS 0, still with dizziness and nausea. Patient was out of window for TNK and was deemed not a candidate for acute thrombectomy. MRI completed showing large cerebellar infarct with partial effacement of 4th ventricle and mass effect on the medulla and left cerebellar tonsillar herniation. 63yo Female patient with PMH of cholecystitis s/p lap roel, not on any medications. 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 Stroke code was called. NIHSS 0, still with dizziness and nausea. Patient was out of window for TNK and was deemed not a candidate for acute thrombectomy. MRI completed showing large cerebellar infarct with partial effacement of 4th ventricle and mass effect on the medulla and left cerebellar tonsillar herniation.

## 2024-11-14 NOTE — PROGRESS NOTE ADULT - SUBJECTIVE AND OBJECTIVE BOX
64F PMHx of cholecystitis s/p lap roel, not on any medications, presents to ED for dizziness, nausea, and vomiting. LKW 11:30pm the night before. 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 Stroke code was called. NIHSS 0, still with dizziness and nausea. Patient was out of window for TNK and was deemed not a candidate for acute thrombectomy. received plavix load and admitted to stroke service now with worsening exam transferred to neurocritical care for further man agent and workup.    Hosp Day #5-  L cerebellar infarct / hydro   No events overnight         Allergies    penicillin (Hives)    Intolerances      PAST MEDICAL & SURGICAL HISTORY:  No pertinent past medical history  S/P cholecystectomy- 2019   H/O colonoscopy  2017        Allergies    penicillin (Hives)    ICU Vital Signs Last 24 Hrs  T(C): 36 (14 Nov 2024 08:00), Max: 36.7 (13 Nov 2024 12:00)  T(F): 96.8 (14 Nov 2024 08:00), Max: 98 (13 Nov 2024 12:00)  HR: 71 (14 Nov 2024 10:00) (58 - 74)  BP: 143/77 (14 Nov 2024 10:00) (109/56 - 152/78)  BP(mean): 105 (14 Nov 2024 10:00) (75 - 108)  RR: 21 (14 Nov 2024 10:00) (12 - 31)  SpO2: 98% (14 Nov 2024 10:00) (94% - 99%)    O2 Parameters below as of 14 Nov 2024 08:00  Patient On (Oxygen Delivery Method): room air      13 Nov 2024 07:01  -  14 Nov 2024 07:00  --------------------------------------------------------  IN:    sodium chloride 3%: 320 mL    sodium chloride 3%: 750 mL  Total IN: 1070 mL    OUT:    Voided (mL): 2600 mL  Total OUT: 2600 mL    Total NET: -1530 mL    MEDICATIONS  (STANDING):  aspirin  chewable 81 milliGRAM(s) Oral daily  atorvastatin 80 milliGRAM(s) Oral at bedtime  chlorhexidine 2% Cloths 1 Application(s) Topical <User Schedule>  enoxaparin Injectable 40 milliGRAM(s) SubCutaneous every 24 hours  polyethylene glycol 3350 17 Gram(s) Oral daily  potassium chloride  20 mEq/100 mL IVPB 20 milliEquivalent(s) IV Intermittent every 2 hours  scopolamine 1 mG/72 Hr(s) Patch 1 Patch Transdermal every 72 hours  senna 2 Tablet(s) Oral at bedtime  sodium chloride 2 Gram(s) Oral every 8 hours  sodium chloride 3%. 500 milliLiter(s) (50 mL/Hr) IV Continuous <Continuous>      MEDICATIONS  (PRN):  acetaminophen     Tablet .. 650 milliGRAM(s) Oral every 6 hours PRN Temp greater or equal to 38C (100.4F), Mild Pain (1 - 3)  meclizine 12.5 milliGRAM(s) Oral every 6 hours PRN Dizziness  ondansetron Injectable 4 milliGRAM(s) IV Push three times a day PRN Nausea and/or Vomiting      11-14    143  |  110  |  8[L]  ----------------------------<  92  3.4[L]   |  24  |  0.5[L]    Ca    8.2[L]      14 Nov 2024 04:21  Phos  3.0     11-14  Mg     2.0     11-14    TPro  5.7[L]  /  Alb  3.4[L]  /  TBili  0.4  /  DBili  x   /  AST  62[H]  /  ALT  45[H]  /  AlkPhos  95  11-14 11-13    140  |  108  |  6[L]  ----------------------------<  104[H]  3.8   |  23  |  0.5[L]    Ca    7.9[L]      13 Nov 2024 04:32  Phos  2.5     11-13  Mg     1.8     11-13    TPro  6.2  /  Alb  3.8  /  TBili  0.3  /  DBili  x   /  AST  63[H]  /  ALT  38  /  AlkPhos  94  11-13    Stroke Measures  HGBA1C - 5.9  TSH - 1.7  LDL- 66    11-11    146  |  113[H]  |  10  ----------------------------<  82  3.7   |  21  |  0.6[L]    Ca    8.6      11 Nov 2024 04:26  Phos  2.7     11-11  Mg     2.2     11-11    TPro  5.8[L]  /  Alb  3.6  /  TBili  0.7  /  DBili  x   /  AST  35  /  ALT  25  /  AlkPhos  79  11-11    PRU - 205 - 11/11/24     CT Head No Cont (11.13.24 @ 14:54) >  IMPRESSION:    Redemonstratedacute infarct in the inferior left cerebellum without   significant change since the prior CT head from 11/11/2024. No   intracranial hemorrhage.       VA Duplex Lower Ext Vein Scan, Bilat (11.11.24 @ 09:17) >  IMPRESSION:    Right peroneal is thrombosed    No evidence of deep venous thrombosis in left lower extremity    < end of copied text >      CT Head No Cont (11.11.24 @ 05:07) >  IMPRESSION:    Stable appearance of the large left inferior cerebellar infarct. Stable   mass effect upon the fourth ventricle and medulla. Stable ventricular   size, without significant hydrocephalus.    CT Angio Neck w/ IV Cont (11.09.24 @ 19:38) >    IMPRESSION:  CT HEAD:  Acute infarct involving the left cerebellarhemisphere without evidence   for hemorrhage.    CTA HEAD/NECK:  Occluded left vertebral artery.        MR Head No Cont (11.10.24 @ 09:09) >    IMPRESSION:  Large acute infarct involving the inferomedial left cerebellum with mass   effect resulting in partial effacement of the fourth ventricle without   hydrocephalus. There is mass effect resulting in anterolateral   displacement of medulla as well as left cerebellar tonsillar herniation   through the foramen magnum.       TTE Echo Complete w/ Contrast w/o Doppler (11.11.24 @ 08:21) >  Summary:   1. Left ventricular ejection fraction, by visual estimation, is 60 to   65%.   2. Normal left ventricular size and wall thicknesses, with normal   systolic and diastolic function.   3. Intravenous injection of agitated saline demonstrates the presence of   a patent foramen ovale.   4. The aortic valve was not well visualized. Peak transaortic gradient   equals 31.6 mmHg, mean transaortic gradient equals 16.3 mmHg, the   calculated aortic valve area equals 1.30 cm² by the continuity equation   consistent with moderate aortic stenosis.   5. Mild tricuspid regurgitation.   6. Estimated pulmonary artery systolic pressure is 38.5 mmHg assuming a   right atrial pressure of 8 mmHg, which is consistent with borderline   pulmonary hypertension.     12 Lead ECG (11.09.24 @ 19:30) >  Atrial Rate 85 BPM    P-R Interval 176 ms    QRS Duration 88 ms    Q-T Interval 378 ms    QTC Calculation(Bazett) 449 ms    P Axis 56 degrees    R Axis 65 degrees    T Axis 40 degrees    Diagnosis Line Normal sinus rhythm  Normal ECG    R peroneal DVT         PHYSICAL EXAM:    Constitutional: No Acute Distress     Neurological: Awake, alert oriented to person, place and time, Following Commands, PERRL, EOMI, No Gaze Preference, Face Symmetrical, Speech Fluent, No dysmetria, No ataxia, No nystagmus     Motor exam:          Upper extremity                         Delt     Bicep     Tricep    HG                                                 R         RUE drift 5/5                                               L          5/5        5/5        5/5       5/5          Lower extremity                        HF         KF        KE       DF         PF                                                  R        5/5 5/5 5/5 5/5         5/5                                               L         5/5 5/5       5/5       5/5          5/5                                                 Sensation: [X ] intact to light touch      Reflexes: Deep Tendon Reflexes Intact     Pulmonary: Clear to Auscultation, No rales, No rhonchi, No wheezes     Cardiovascular: S1, S2, Regular rate and rhythm     Gastrointestinal: Soft, Non-tender, Non-distended     Extremities: No calf tenderness      64F PMHx of cholecystitis s/p lap roel, not on any medications, presents to ED for dizziness, nausea, and vomiting. LKW 11:30pm the night before. 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 Stroke code was called. NIHSS 0, still with dizziness and nausea. Patient was out of window for TNK and was deemed not a candidate for acute thrombectomy. received plavix load and admitted to stroke service now with worsening exam transferred to neurocritical care for further man agent and workup.    Hosp Day #5-  L cerebellar infarct / hydro   No events overnight         Allergies    penicillin (Hives)    Intolerances      PAST MEDICAL & SURGICAL HISTORY:  No pertinent past medical history  S/P cholecystectomy- 2019   H/O colonoscopy  2017        Allergies    penicillin (Hives)    ICU Vital Signs Last 24 Hrs  T(C): 36 (14 Nov 2024 08:00), Max: 36.7 (13 Nov 2024 12:00)  T(F): 96.8 (14 Nov 2024 08:00), Max: 98 (13 Nov 2024 12:00)  HR: 71 (14 Nov 2024 10:00) (58 - 74)  BP: 143/77 (14 Nov 2024 10:00) (109/56 - 152/78)  BP(mean): 105 (14 Nov 2024 10:00) (75 - 108)  RR: 21 (14 Nov 2024 10:00) (12 - 31)  SpO2: 98% (14 Nov 2024 10:00) (94% - 99%)    O2 Parameters below as of 14 Nov 2024 08:00  Patient On (Oxygen Delivery Method): room air      13 Nov 2024 07:01  -  14 Nov 2024 07:00  --------------------------------------------------------  IN:    sodium chloride 3%: 320 mL    sodium chloride 3%: 750 mL  Total IN: 1070 mL    OUT:    Voided (mL): 2600 mL  Total OUT: 2600 mL    Total NET: -1530 mL    MEDICATIONS  (STANDING):  aspirin  chewable 81 milliGRAM(s) Oral daily  atorvastatin 80 milliGRAM(s) Oral at bedtime  chlorhexidine 2% Cloths 1 Application(s) Topical <User Schedule>  enoxaparin Injectable 40 milliGRAM(s) SubCutaneous every 24 hours  polyethylene glycol 3350 17 Gram(s) Oral daily  potassium chloride  20 mEq/100 mL IVPB 20 milliEquivalent(s) IV Intermittent every 2 hours  scopolamine 1 mG/72 Hr(s) Patch 1 Patch Transdermal every 72 hours  senna 2 Tablet(s) Oral at bedtime  sodium chloride 2 Gram(s) Oral every 8 hours  sodium chloride 3%. 500 milliLiter(s) (50 mL/Hr) IV Continuous <Continuous>      MEDICATIONS  (PRN):  acetaminophen     Tablet .. 650 milliGRAM(s) Oral every 6 hours PRN Temp greater or equal to 38C (100.4F), Mild Pain (1 - 3)  meclizine 12.5 milliGRAM(s) Oral every 6 hours PRN Dizziness  ondansetron Injectable 4 milliGRAM(s) IV Push three times a day PRN Nausea and/or Vomiting      11-14    143  |  110  |  8[L]  ----------------------------<  92  3.4[L]   |  24  |  0.5[L]    Ca    8.2[L]      14 Nov 2024 04:21  Phos  3.0     11-14  Mg     2.0     11-14    TPro  5.7[L]  /  Alb  3.4[L]  /  TBili  0.4  /  DBili  x   /  AST  62[H]  /  ALT  45[H]  /  AlkPhos  95  11-14 11-13    140  |  108  |  6[L]  ----------------------------<  104[H]  3.8   |  23  |  0.5[L]    Ca    7.9[L]      13 Nov 2024 04:32  Phos  2.5     11-13  Mg     1.8     11-13    TPro  6.2  /  Alb  3.8  /  TBili  0.3  /  DBili  x   /  AST  63[H]  /  ALT  38  /  AlkPhos  94  11-13    Stroke Measures  HGBA1C - 5.9  TSH - 1.7  LDL- 66    11-11    146  |  113[H]  |  10  ----------------------------<  82  3.7   |  21  |  0.6[L]    Ca    8.6      11 Nov 2024 04:26  Phos  2.7     11-11  Mg     2.2     11-11    TPro  5.8[L]  /  Alb  3.6  /  TBili  0.7  /  DBili  x   /  AST  35  /  ALT  25  /  AlkPhos  79  11-11    PRU - 205 - 11/11/24     CT Head No Cont (11.13.24 @ 14:54) >  IMPRESSION:    Redemonstratedacute infarct in the inferior left cerebellum without   significant change since the prior CT head from 11/11/2024. No   intracranial hemorrhage.       VA Duplex Lower Ext Vein Scan, Bilat (11.11.24 @ 09:17) >  IMPRESSION:    Right peroneal is thrombosed    No evidence of deep venous thrombosis in left lower extremity    < end of copied text >      CT Head No Cont (11.11.24 @ 05:07) >  IMPRESSION:    Stable appearance of the large left inferior cerebellar infarct. Stable   mass effect upon the fourth ventricle and medulla. Stable ventricular   size, without significant hydrocephalus.    CT Angio Neck w/ IV Cont (11.09.24 @ 19:38) >    IMPRESSION:  CT HEAD:  Acute infarct involving the left cerebellarhemisphere without evidence   for hemorrhage.    CTA HEAD/NECK:  Occluded left vertebral artery.        MR Head No Cont (11.10.24 @ 09:09) >    IMPRESSION:  Large acute infarct involving the inferomedial left cerebellum with mass   effect resulting in partial effacement of the fourth ventricle without   hydrocephalus. There is mass effect resulting in anterolateral   displacement of medulla as well as left cerebellar tonsillar herniation   through the foramen magnum.       TTE Echo Complete w/ Contrast w/o Doppler (11.11.24 @ 08:21) >  Summary:   1. Left ventricular ejection fraction, by visual estimation, is 60 to   65%.   2. Normal left ventricular size and wall thicknesses, with normal   systolic and diastolic function.   3. Intravenous injection of agitated saline demonstrates the presence of   a patent foramen ovale.   4. The aortic valve was not well visualized. Peak transaortic gradient   equals 31.6 mmHg, mean transaortic gradient equals 16.3 mmHg, the   calculated aortic valve area equals 1.30 cm² by the continuity equation   consistent with moderate aortic stenosis.   5. Mild tricuspid regurgitation.   6. Estimated pulmonary artery systolic pressure is 38.5 mmHg assuming a   right atrial pressure of 8 mmHg, which is consistent with borderline   pulmonary hypertension.     12 Lead ECG (11.09.24 @ 19:30) >  Atrial Rate 85 BPM    P-R Interval 176 ms    QRS Duration 88 ms    Q-T Interval 378 ms    QTC Calculation(Bazett) 449 ms    P Axis 56 degrees    R Axis 65 degrees    T Axis 40 degrees    Diagnosis Line Normal sinus rhythm  Normal ECG    R peroneal DVT         PHYSICAL EXAM:    Constitutional: No Acute Distress     Neurological: Awake, alert oriented to person, place and time, Following Commands, PERRL, EOMI, No Gaze Preference, Face Symmetrical, Speech Fluent, No dysmetria, No ataxia, No nystagmus     Motor exam:          Upper extremity                         Delt     Bicep     Tricep    HG                                                 R         RUE drift 5/5                                               L          5/5        5/5        5/5       5/5          Lower extremity                        HF         KF        KE       DF         PF                                                  R        5/5 5/5        5/5       5/5         5/5                                               L         5/5        5/5       5/5       5/5          5/5                                                 Sensation: [X ] intact to light touch        Pulmonary: Clear to Auscultation, No rales, No rhonchi, No wheezes     Cardiovascular: S1, S2, Regular rate and rhythm     Gastrointestinal: Soft, Non-tender, Non-distended     Extremities: No calf tenderness

## 2024-11-14 NOTE — SPEECH LANGUAGE PATHOLOGY EVALUATION - SLP DIAGNOSIS
Pt presents with clear speech, preserved receptive and expressive language. Mild cognitive linguistic deficit in the area of STM.

## 2024-11-15 LAB
ALBUMIN SERPL ELPH-MCNC: 3.6 G/DL — SIGNIFICANT CHANGE UP (ref 3.5–5.2)
ALP SERPL-CCNC: 100 U/L — SIGNIFICANT CHANGE UP (ref 30–115)
ALT FLD-CCNC: 65 U/L — HIGH (ref 0–41)
ANION GAP SERPL CALC-SCNC: 8 MMOL/L — SIGNIFICANT CHANGE UP (ref 7–14)
ANION GAP SERPL CALC-SCNC: 9 MMOL/L — SIGNIFICANT CHANGE UP (ref 7–14)
AST SERPL-CCNC: 87 U/L — HIGH (ref 0–41)
BILIRUB SERPL-MCNC: 0.5 MG/DL — SIGNIFICANT CHANGE UP (ref 0.2–1.2)
BUN SERPL-MCNC: 6 MG/DL — LOW (ref 10–20)
BUN SERPL-MCNC: 8 MG/DL — LOW (ref 10–20)
CALCIUM SERPL-MCNC: 8.1 MG/DL — LOW (ref 8.4–10.4)
CALCIUM SERPL-MCNC: 8.2 MG/DL — LOW (ref 8.4–10.4)
CHLORIDE SERPL-SCNC: 107 MMOL/L — SIGNIFICANT CHANGE UP (ref 98–110)
CHLORIDE SERPL-SCNC: 110 MMOL/L — SIGNIFICANT CHANGE UP (ref 98–110)
CO2 SERPL-SCNC: 22 MMOL/L — SIGNIFICANT CHANGE UP (ref 17–32)
CO2 SERPL-SCNC: 26 MMOL/L — SIGNIFICANT CHANGE UP (ref 17–32)
CREAT SERPL-MCNC: 0.5 MG/DL — LOW (ref 0.7–1.5)
CREAT SERPL-MCNC: 0.5 MG/DL — LOW (ref 0.7–1.5)
EGFR: 104 ML/MIN/1.73M2 — SIGNIFICANT CHANGE UP
EGFR: 104 ML/MIN/1.73M2 — SIGNIFICANT CHANGE UP
GLUCOSE SERPL-MCNC: 99 MG/DL — SIGNIFICANT CHANGE UP (ref 70–99)
GLUCOSE SERPL-MCNC: 99 MG/DL — SIGNIFICANT CHANGE UP (ref 70–99)
MAGNESIUM SERPL-MCNC: 2.2 MG/DL — SIGNIFICANT CHANGE UP (ref 1.8–2.4)
PHOSPHATE SERPL-MCNC: 3.2 MG/DL — SIGNIFICANT CHANGE UP (ref 2.1–4.9)
POTASSIUM SERPL-MCNC: 3.8 MMOL/L — SIGNIFICANT CHANGE UP (ref 3.5–5)
POTASSIUM SERPL-MCNC: 4.1 MMOL/L — SIGNIFICANT CHANGE UP (ref 3.5–5)
POTASSIUM SERPL-SCNC: 3.8 MMOL/L — SIGNIFICANT CHANGE UP (ref 3.5–5)
POTASSIUM SERPL-SCNC: 4.1 MMOL/L — SIGNIFICANT CHANGE UP (ref 3.5–5)
PROT SERPL-MCNC: 6 G/DL — SIGNIFICANT CHANGE UP (ref 6–8)
SODIUM SERPL-SCNC: 138 MMOL/L — SIGNIFICANT CHANGE UP (ref 135–146)
SODIUM SERPL-SCNC: 144 MMOL/L — SIGNIFICANT CHANGE UP (ref 135–146)

## 2024-11-15 PROCEDURE — 99231 SBSQ HOSP IP/OBS SF/LOW 25: CPT

## 2024-11-15 PROCEDURE — 70450 CT HEAD/BRAIN W/O DYE: CPT | Mod: 26

## 2024-11-15 RX ORDER — SODIUM CHLORIDE 9 MG/ML
3 INJECTION, SOLUTION INTRAMUSCULAR; INTRAVENOUS; SUBCUTANEOUS EVERY 6 HOURS
Refills: 0 | Status: DISCONTINUED | OUTPATIENT
Start: 2024-11-15 | End: 2024-11-18

## 2024-11-15 RX ORDER — POLYETHYLENE GLYCOL 3350 17 G/17G
17 POWDER, FOR SOLUTION ORAL EVERY 12 HOURS
Refills: 0 | Status: DISCONTINUED | OUTPATIENT
Start: 2024-11-15 | End: 2024-11-21

## 2024-11-15 RX ORDER — POTASSIUM CHLORIDE 600 MG/1
20 TABLET, EXTENDED RELEASE ORAL ONCE
Refills: 0 | Status: DISCONTINUED | OUTPATIENT
Start: 2024-11-15 | End: 2024-11-15

## 2024-11-15 RX ORDER — POTASSIUM CHLORIDE 600 MG/1
20 TABLET, EXTENDED RELEASE ORAL ONCE
Refills: 0 | Status: COMPLETED | OUTPATIENT
Start: 2024-11-15 | End: 2024-11-15

## 2024-11-15 RX ORDER — ACETAMINOPHEN 500MG 500 MG/1
650 TABLET, COATED ORAL EVERY 6 HOURS
Refills: 0 | Status: DISCONTINUED | OUTPATIENT
Start: 2024-11-15 | End: 2024-11-21

## 2024-11-15 RX ADMIN — POTASSIUM CHLORIDE 20 MILLIEQUIVALENT(S): 600 TABLET, EXTENDED RELEASE ORAL at 18:55

## 2024-11-15 RX ADMIN — SCOPOLAMINE 1 PATCH: 1 PATCH, EXTENDED RELEASE TRANSDERMAL at 19:35

## 2024-11-15 RX ADMIN — SODIUM CHLORIDE 3 GRAM(S): 9 INJECTION, SOLUTION INTRAMUSCULAR; INTRAVENOUS; SUBCUTANEOUS at 23:42

## 2024-11-15 RX ADMIN — Medication 30 MILLILITER(S): at 21:37

## 2024-11-15 RX ADMIN — ONDANSETRON HYDROCHLORIDE 4 MILLIGRAM(S): 4 TABLET, FILM COATED ORAL at 19:02

## 2024-11-15 RX ADMIN — Medication 100 GRAM(S): at 11:56

## 2024-11-15 RX ADMIN — ENOXAPARIN SODIUM 40 MILLIGRAM(S): 30 INJECTION SUBCUTANEOUS at 06:06

## 2024-11-15 RX ADMIN — SODIUM CHLORIDE 3 GRAM(S): 9 INJECTION, SOLUTION INTRAMUSCULAR; INTRAVENOUS; SUBCUTANEOUS at 17:33

## 2024-11-15 RX ADMIN — CHLORHEXIDINE GLUCONATE 1 APPLICATION(S): 1.2 RINSE ORAL at 06:06

## 2024-11-15 RX ADMIN — ACETAMINOPHEN 500MG 975 MILLIGRAM(S): 500 TABLET, COATED ORAL at 06:10

## 2024-11-15 RX ADMIN — SODIUM CHLORIDE 30 MILLILITER(S): 3 INJECTION, SOLUTION INTRAVENOUS at 09:34

## 2024-11-15 RX ADMIN — SODIUM CHLORIDE 2 GRAM(S): 9 INJECTION, SOLUTION INTRAMUSCULAR; INTRAVENOUS; SUBCUTANEOUS at 06:06

## 2024-11-15 RX ADMIN — Medication 2 TABLET(S): at 21:51

## 2024-11-15 RX ADMIN — SODIUM CHLORIDE 3 GRAM(S): 9 INJECTION, SOLUTION INTRAMUSCULAR; INTRAVENOUS; SUBCUTANEOUS at 11:58

## 2024-11-15 RX ADMIN — ACETAMINOPHEN 500MG 975 MILLIGRAM(S): 500 TABLET, COATED ORAL at 06:55

## 2024-11-15 RX ADMIN — SCOPOLAMINE 1 PATCH: 1 PATCH, EXTENDED RELEASE TRANSDERMAL at 13:18

## 2024-11-15 RX ADMIN — Medication 81 MILLIGRAM(S): at 11:58

## 2024-11-15 RX ADMIN — Medication 40 MILLIGRAM(S): at 21:37

## 2024-11-15 RX ADMIN — POLYETHYLENE GLYCOL 3350 17 GRAM(S): 17 POWDER, FOR SOLUTION ORAL at 17:34

## 2024-11-15 RX ADMIN — Medication 30 MILLILITER(S): at 13:18

## 2024-11-15 NOTE — PROGRESS NOTE ADULT - SUBJECTIVE AND OBJECTIVE BOX
64F PMHx of cholecystitis s/p lap roel, not on any medications, presents to ED for dizziness, nausea, and vomiting. LKW 11:30pm the night before. 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 Stroke code was called. NIHSS 0, still with dizziness and nausea. Patient was out of window for TNK and was deemed not a candidate for acute thrombectomy. received plavix load and admitted to stroke service now with worsening exam transferred to neurocritical care for further man agent and workup.    Hosp Day #6-  L cerebellar infarct / hydro   No events overnight         Allergies    penicillin (Hives)    Intolerances      PAST MEDICAL & SURGICAL HISTORY:  No pertinent past medical history  S/P cholecystectomy- 2019   H/O colonoscopy  2017        Allergies    penicillin (Hives)    ICU Vital Signs Last 24 Hrs  T(C): 37.3 (15 Nov 2024 04:00), Max: 37.3 (15 Nov 2024 04:00)  T(F): 99.1 (15 Nov 2024 04:00), Max: 99.1 (15 Nov 2024 04:00)  HR: 62 (15 Nov 2024 06:00) (61 - 78)  BP: 127/69 (15 Nov 2024 06:00) (112/58 - 143/77)  BP(mean): 92 (15 Nov 2024 06:00) (79 - 105)  RR: 13 (15 Nov 2024 06:00) (13 - 23)  SpO2: 96% (15 Nov 2024 06:00) (95% - 100%)    O2 Parameters below as of 14 Nov 2024 17:00  Patient On (Oxygen Delivery Method): room air      14 Nov 2024 07:01  -  15 Nov 2024 07:00  --------------------------------------------------------  IN:    Oral Fluid: 240 mL    sodium chloride 3%: 640 mL  Total IN: 880 mL    OUT:    Voided (mL): 1900 mL  Total OUT: 1900 mL    Total NET: -1020 mL    MEDICATIONS  (STANDING):  aspirin  chewable 81 milliGRAM(s) Oral daily  atorvastatin 40 milliGRAM(s) Oral at bedtime  chlorhexidine 2% Cloths 1 Application(s) Topical <User Schedule>  enoxaparin Injectable 40 milliGRAM(s) SubCutaneous every 24 hours  polyethylene glycol 3350 17 Gram(s) Oral daily  scopolamine 1 mG/72 Hr(s) Patch 1 Patch Transdermal every 72 hours  senna 2 Tablet(s) Oral at bedtime  sodium chloride 3 Gram(s) Oral every 6 hours  sodium chloride 3%. 500 milliLiter(s) (30 mL/Hr) IV Continuous <Continuous>      MEDICATIONS  (PRN):  acetaminophen     Tablet .. 650 milliGRAM(s) Oral every 6 hours PRN Temp greater or equal to 38C (100.4F), Mild Pain (1 - 3)  meclizine 12.5 milliGRAM(s) Oral every 6 hours PRN Dizziness  ondansetron Injectable 4 milliGRAM(s) IV Push three times a day PRN Nausea and/or Vomiting    11-15    138  |  107  |  6[L]  ----------------------------<  99  4.1   |  22  |  0.5[L]    Ca    8.1[L]      15 Nov 2024 04:27  Phos  3.2     11-15  Mg     2.2     11-15    TPro  6.0  /  Alb  3.6  /  TBili  0.5  /  DBili  x   /  AST  87[H]  /  ALT  65[H]  /  AlkPhos  100  11-15        11-14    143  |  110  |  8[L]  ----------------------------<  92  3.4[L]   |  24  |  0.5[L]    Ca    8.2[L]      14 Nov 2024 04:21  Phos  3.0     11-14  Mg     2.0     11-14    TPro  5.7[L]  /  Alb  3.4[L]  /  TBili  0.4  /  DBili  x   /  AST  62[H]  /  ALT  45[H]  /  AlkPhos  95  11-14 11-13    140  |  108  |  6[L]  ----------------------------<  104[H]  3.8   |  23  |  0.5[L]    Ca    7.9[L]      13 Nov 2024 04:32  Phos  2.5     11-13  Mg     1.8     11-13    TPro  6.2  /  Alb  3.8  /  TBili  0.3  /  DBili  x   /  AST  63[H]  /  ALT  38  /  AlkPhos  94  11-13    Stroke Measures  HGBA1C - 5.9  TSH - 1.7  LDL- 66    11-11    146  |  113[H]  |  10  ----------------------------<  82  3.7   |  21  |  0.6[L]    Ca    8.6      11 Nov 2024 04:26  Phos  2.7     11-11  Mg     2.2     11-11    TPro  5.8[L]  /  Alb  3.6  /  TBili  0.7  /  DBili  x   /  AST  35  /  ALT  25  /  AlkPhos  79  11-11    PRU - 205 - 11/11/24     CT Head No Cont (11.13.24 @ 14:54) >  IMPRESSION:    Redemonstratedacute infarct in the inferior left cerebellum without   significant change since the prior CT head from 11/11/2024. No   intracranial hemorrhage.       VA Duplex Lower Ext Vein Scan, Bilat (11.11.24 @ 09:17) >  IMPRESSION:    Right peroneal is thrombosed    No evidence of deep venous thrombosis in left lower extremity    < end of copied text >      CT Head No Cont (11.11.24 @ 05:07) >  IMPRESSION:    Stable appearance of the large left inferior cerebellar infarct. Stable   mass effect upon the fourth ventricle and medulla. Stable ventricular   size, without significant hydrocephalus.    CT Angio Neck w/ IV Cont (11.09.24 @ 19:38) >    IMPRESSION:  CT HEAD:  Acute infarct involving the left cerebellarhemisphere without evidence   for hemorrhage.    CTA HEAD/NECK:  Occluded left vertebral artery.        MR Head No Cont (11.10.24 @ 09:09) >    IMPRESSION:  Large acute infarct involving the inferomedial left cerebellum with mass   effect resulting in partial effacement of the fourth ventricle without   hydrocephalus. There is mass effect resulting in anterolateral   displacement of medulla as well as left cerebellar tonsillar herniation   through the foramen magnum.       TTE Echo Complete w/ Contrast w/o Doppler (11.11.24 @ 08:21) >  Summary:   1. Left ventricular ejection fraction, by visual estimation, is 60 to   65%.   2. Normal left ventricular size and wall thicknesses, with normal   systolic and diastolic function.   3. Intravenous injection of agitated saline demonstrates the presence of   a patent foramen ovale.   4. The aortic valve was not well visualized. Peak transaortic gradient   equals 31.6 mmHg, mean transaortic gradient equals 16.3 mmHg, the   calculated aortic valve area equals 1.30 cm² by the continuity equation   consistent with moderate aortic stenosis.   5. Mild tricuspid regurgitation.   6. Estimated pulmonary artery systolic pressure is 38.5 mmHg assuming a   right atrial pressure of 8 mmHg, which is consistent with borderline   pulmonary hypertension.     12 Lead ECG (11.09.24 @ 19:30) >  Atrial Rate 85 BPM    P-R Interval 176 ms    QRS Duration 88 ms    Q-T Interval 378 ms    QTC Calculation(Bazett) 449 ms    P Axis 56 degrees    R Axis 65 degrees    T Axis 40 degrees    Diagnosis Line Normal sinus rhythm  Normal ECG    R peroneal DVT         PHYSICAL EXAM:    Constitutional: No Acute Distress     Neurological: Awake, alert oriented to person, place and time, Following Commands, PERRL, EOMI, No Gaze Preference, Face Symmetrical, Speech Fluent, No dysmetria, No ataxia, No nystagmus     Motor exam:          Upper extremity                         Delt     Bicep     Tricep    HG                                                 R         RUE drift 5/5                                               L          5/5        5/5        5/5       5/5          Lower extremity                        HF         KF        KE       DF         PF                                                  R        5/5        5/5        5/5       5/5         5/5                                               L         5/5        5/5       5/5       5/5          5/5                                                 Sensation: [X ] intact to light touch        Pulmonary: Clear to Auscultation, No rales, No rhonchi, No wheezes     Cardiovascular: S1, S2, Regular rate and rhythm     Gastrointestinal: Soft, Non-tender, Non-distended     Extremities: No calf tenderness      64F PMHx of cholecystitis s/p lap roel, not on any medications, presents to ED for dizziness, nausea, and vomiting. LKW 11:30pm the night before. 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 Stroke code was called. NIHSS 0, still with dizziness and nausea. Patient was out of window for TNK and was deemed not a candidate for acute thrombectomy. received plavix load and admitted to stroke service now with worsening exam transferred to neurocritical care for further man agent and workup.    Hosp Day #7-  L cerebellar infarct / hydro   No events overnight         Allergies    penicillin (Hives)    Intolerances      PAST MEDICAL & SURGICAL HISTORY:  No pertinent past medical history  S/P cholecystectomy- 2019   H/O colonoscopy  2017        Allergies    penicillin (Hives)    ICU Vital Signs Last 24 Hrs  T(C): 37.3 (15 Nov 2024 04:00), Max: 37.3 (15 Nov 2024 04:00)  T(F): 99.1 (15 Nov 2024 04:00), Max: 99.1 (15 Nov 2024 04:00)  HR: 62 (15 Nov 2024 06:00) (61 - 78)  BP: 127/69 (15 Nov 2024 06:00) (112/58 - 143/77)  BP(mean): 92 (15 Nov 2024 06:00) (79 - 105)  RR: 13 (15 Nov 2024 06:00) (13 - 23)  SpO2: 96% (15 Nov 2024 06:00) (95% - 100%)    O2 Parameters below as of 14 Nov 2024 17:00  Patient On (Oxygen Delivery Method): room air      14 Nov 2024 07:01  -  15 Nov 2024 07:00  --------------------------------------------------------  IN:    Oral Fluid: 240 mL    sodium chloride 3%: 640 mL  Total IN: 880 mL    OUT:    Voided (mL): 1900 mL  Total OUT: 1900 mL    Total NET: -1020 mL    MEDICATIONS  (STANDING):  aspirin  chewable 81 milliGRAM(s) Oral daily  atorvastatin 40 milliGRAM(s) Oral at bedtime  chlorhexidine 2% Cloths 1 Application(s) Topical <User Schedule>  enoxaparin Injectable 40 milliGRAM(s) SubCutaneous every 24 hours  polyethylene glycol 3350 17 Gram(s) Oral daily  scopolamine 1 mG/72 Hr(s) Patch 1 Patch Transdermal every 72 hours  senna 2 Tablet(s) Oral at bedtime  sodium chloride 3 Gram(s) Oral every 6 hours  sodium chloride 3%. 500 milliLiter(s) (30 mL/Hr) IV Continuous <Continuous>      MEDICATIONS  (PRN):  acetaminophen     Tablet .. 650 milliGRAM(s) Oral every 6 hours PRN Temp greater or equal to 38C (100.4F), Mild Pain (1 - 3)  meclizine 12.5 milliGRAM(s) Oral every 6 hours PRN Dizziness  ondansetron Injectable 4 milliGRAM(s) IV Push three times a day PRN Nausea and/or Vomiting    11-15    138  |  107  |  6[L]  ----------------------------<  99  4.1   |  22  |  0.5[L]    Ca    8.1[L]      15 Nov 2024 04:27- Correct 8.5  Phos  3.2     11-15  Mg     2.2     11-15    TPro  6.0  /  Alb  3.6  /  TBili  0.5  /  DBili  x   /  AST  87[H]  /  ALT  65[H]  /  AlkPhos  100  11-15        11-14    143  |  110  |  8[L]  ----------------------------<  92  3.4[L]   |  24  |  0.5[L]    Ca    8.2[L]      14 Nov 2024 04:21  Phos  3.0     11-14  Mg     2.0     11-14    TPro  5.7[L]  /  Alb  3.4[L]  /  TBili  0.4  /  DBili  x   /  AST  62[H]  /  ALT  45[H]  /  AlkPhos  95  11-14 11-13    140  |  108  |  6[L]  ----------------------------<  104[H]  3.8   |  23  |  0.5[L]    Ca    7.9[L]      13 Nov 2024 04:32  Phos  2.5     11-13  Mg     1.8     11-13    TPro  6.2  /  Alb  3.8  /  TBili  0.3  /  DBili  x   /  AST  63[H]  /  ALT  38  /  AlkPhos  94  11-13    Stroke Measures  HGBA1C - 5.9  TSH - 1.7  LDL- 66      PRU - 205 - 11/11/24     CT Head No Cont (11.13.24 @ 14:54) >  IMPRESSION:    Redemonstratedacute infarct in the inferior left cerebellum without   significant change since the prior CT head from 11/11/2024. No   intracranial hemorrhage.       VA Duplex Lower Ext Vein Scan, Bilat (11.11.24 @ 09:17) >  IMPRESSION:    Right peroneal is thrombosed    No evidence of deep venous thrombosis in left lower extremity    < end of copied text >      CT Head No Cont (11.11.24 @ 05:07) >  IMPRESSION:    Stable appearance of the large left inferior cerebellar infarct. Stable   mass effect upon the fourth ventricle and medulla. Stable ventricular   size, without significant hydrocephalus.    CT Angio Neck w/ IV Cont (11.09.24 @ 19:38) >    IMPRESSION:  CT HEAD:  Acute infarct involving the left cerebellarhemisphere without evidence   for hemorrhage.    CTA HEAD/NECK:  Occluded left vertebral artery.     MR Angio Neck w/wo IV Cont (11.14.24 @ 19:11) >    IMPRESSION:    Interval development of patchy petechial hemorrhagic transformation in   the left inferior cerebellar infarct since the prior CT head from   11/13/2024. Persistent mass effect on the medulla and right cerebellum.    Improved patency of the previously occluded left vertebral artery (likely   in the V4 segment) with mild residual stenosis in the distal V4 segment.   No evidence of intramural hematoma to suggest dissection.          MR Head No Cont (11.10.24 @ 09:09) >    IMPRESSION:  Large acute infarct involving the inferomedial left cerebellum with mass   effect resulting in partial effacement of the fourth ventricle without   hydrocephalus. There is mass effect resulting in anterolateral   displacement of medulla as well as left cerebellar tonsillar herniation   through the foramen magnum.       TTE Echo Complete w/ Contrast w/o Doppler (11.11.24 @ 08:21) >  Summary:   1. Left ventricular ejection fraction, by visual estimation, is 60 to   65%.   2. Normal left ventricular size and wall thicknesses, with normal   systolic and diastolic function.   3. Intravenous injection of agitated saline demonstrates the presence of   a patent foramen ovale.   4. The aortic valve was not well visualized. Peak transaortic gradient   equals 31.6 mmHg, mean transaortic gradient equals 16.3 mmHg, the   calculated aortic valve area equals 1.30 cm² by the continuity equation   consistent with moderate aortic stenosis.   5. Mild tricuspid regurgitation.   6. Estimated pulmonary artery systolic pressure is 38.5 mmHg assuming a   right atrial pressure of 8 mmHg, which is consistent with borderline   pulmonary hypertension.     12 Lead ECG (11.09.24 @ 19:30) >  Atrial Rate 85 BPM    P-R Interval 176 ms    QRS Duration 88 ms    Q-T Interval 378 ms    QTC Calculation(Bazett) 449 ms    P Axis 56 degrees    R Axis 65 degrees    T Axis 40 degrees    Diagnosis Line Normal sinus rhythm  Normal ECG    R peroneal DVT         PHYSICAL EXAM:    Constitutional: No Acute Distress     Neurological: Awake, alert oriented to person, place and time, Following Commands, PERRL, EOMI, No Gaze Preference, Face Symmetrical, Speech Fluent, No dysmetria, No ataxia, No nystagmus     Motor exam:          Upper extremity                         Delt     Bicep     Tricep    HG                                                 R         RUE drift 5/5                                               L          5/5        5/5        5/5       5/5          Lower extremity                        HF         KF        KE       DF         PF                                                  R        5/5        5/5        5/5       5/5         5/5                                               L         5/5        5/5       5/5       5/5          5/5                                                 Sensation: [X ] intact to light touch        Pulmonary: Clear to Auscultation, No rales, No rhonchi, No wheezes     Cardiovascular: S1, S2, Regular rate and rhythm     Gastrointestinal: Soft, Non-tender, Non-distended     Extremities: No calf tenderness

## 2024-11-15 NOTE — PROGRESS NOTE ADULT - ASSESSMENT
64F. PMH: Hx of Cholecystitis s/p lap-roel, former smoker (13 pack years, quit 14 years ago). Presented 11/9 c/o dizziness nausea and vomiting since waking up from sleep. LKW 11/8 11:30PM Stroke code activated: NIHSS 0. CTH (+) for Acute infarct involving the left cerebellar hemisphere, CTP showed 5cc of penumbra in L hemisphere, with 4cc of core infarct. 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 No TNK OOW, No MT - due to low NIHSS. Admitted to stroke unit for further workup. 11/10 in AM, patient's MRI completed showing large cerebellar infarct with partial effacement of 4th ventricle and mass effect on the medulla and left cerebellar tonsillar herniation. On evaluation, patient was awake, attentive to voice though in mild distress from severe nausea. NIHSS now a 1 for dysmetria on LUE. NSICU consulted, hypertonic saline started. Patient upgraded to ICU for further monitoring. Etiology likely SHEFALI vs ESUS. Patient will be admitted to stroke unit for monitoring and further workup.      NEUROLOGY- - Etiology of  Cerebellar stroke - Non ICAD, embolic vs dissection   - Gen neuro checks q 2  - Stroke Code CTH/CTP/CTA: Completed and reviewed  - ASA 81mg q day/ Plavix off for now ( last Dose 11/10/24 )   -  PRU - 205- 11/11/24  - MRI/MRA neck  - vert artery dissection - Fat sat    - Fall and Aspiration precautions  - Physiatry eval/Physical therapy/Occupational therapy/Speech and Swallow  - Neurosurgery Consult - SOC/Hydro watch   - F/u HbA1c- 5.9 , TSH and Lipid panel- LDL- 66/ TSH - 1.77   -  TTE w bubble study- as above / PFO  +   - Meclizine 12.5 mg prn / Scopolamine  patch  1mg q three days   - hypertonic saline 3%  50cc/hr / goal Na 140-150;   - NaCl tabs 2 grams q 8   - C/w Atorvastatin 80mg daily  - C/w DVT PPX as below    CARDIOLOGY- PFO / HLD   #Hypertension  - Goal -160  -  GREGORIO and if Neg MRA /MRI fat sat will consult cardiology for PFO closure   - hold home blood pressure medication for now  - Stroke Code EKG Results: NSR  #HLD - LDL results: 66  - high dose statin as above    PULMONOLOGY- Ex - smoker   - call provider if SPO2 < 95%    GATROENTEROLOGY  - Diet: NPO after MN for GREGORIO   - Zofran prn - N/V   - Senna / Miralax / MOM 30cc q 8 prn   - last BM -  11/13/24   - No GI prophylaxsis neeed    RENAL/ELECTROLYTES  - D/C horn    Na Cl tabs  2 grams q 6   Na goal 140- 150 for cerebral edema   - Replete K<4 and Mg <2    INFECTIOUS DISEASE  - Monitor  for fever     ENDOCRINE  Stroke Core measures as above     HEME/ONC - R peroneal DVT  Lovenox 40mg q day  ASA 81 mg qday   F/U DVT in 4 Days - 11/16    Dispo  - NSICU 64F. PMH: Hx of Cholecystitis s/p lap-roel, former smoker (13 pack years, quit 14 years ago). Presented 11/9 c/o dizziness nausea and vomiting since waking up from sleep. LKW 11/8 11:30PM Stroke code activated: NIHSS 0. CTH (+) for Acute infarct involving the left cerebellar hemisphere, CTP showed 5cc of penumbra in L hemisphere, with 4cc of core infarct. 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 No TNK OOW, No MT - due to low NIHSS. Admitted to stroke unit for further workup. 11/10 in AM, patient's MRI completed showing large cerebellar infarct with partial effacement of 4th ventricle and mass effect on the medulla and left cerebellar tonsillar herniation. On evaluation, patient was awake, attentive to voice though in mild distress from severe nausea. NIHSS now a 1 for dysmetria on LUE. NSICU consulted, hypertonic saline started. Patient upgraded to ICU for further monitoring. Etiology likely SHEFALI vs ESUS. Patient will be admitted to stroke unit for monitoring and further workup.      NEUROLOGY- - Etiology of  Cerebellar stroke - Non ICAD, embolic vs dissection   - Gen neuro checks q 2  - Stroke Code CTH/CTP/CTA: Completed and reviewed  - ASA 81mg q day/ Plavix off for now ( last Dose 11/10/24 )   -  PRU - 205- 11/11/24  - MRI/MRA neck  - No  vert artery dissection - Fat sat    - Fall and Aspiration precautions  - Physiatry eval/Physical therapy/Occupational therapy/Speech and Swallow  - Neurosurgery Consult - SOC/Hydro watch   - F/u HbA1c- 5.9 , TSH and Lipid panel- LDL- 66/ TSH - 1.77   -  TTE w bubble study- as above / PFO  +   - Meclizine 12.5 mg prn / Scopolamine  patch  1mg q three days   - hypertonic saline 3%  30cc/hr / goal Na 140-150;   - NaCl tabs 3 grams q 8   - C/w Atorvastatin 40mg daily  - C/w DVT PPX as below    CARDIOLOGY-  / HLD   #Hypertension  - Goal -160  - No PFO on GREGORIO or intramural thrombus   - hold home blood pressure medication for now  - Stroke Code EKG Results: NSR  #HLD - LDL results: 66  - high dose statin as above    PULMONOLOGY- Ex - smoker   - call provider if SPO2 < 95%    GATROENTEROLOGY  - Diet: NPO after MN for GREGORIO   - Zofran prn - N/V   - Senna / Miralax / MOM 30cc q 8 prn   - last BM -  11/13/24   - No GI prophylaxsis neeed    RENAL/ELECTROLYTES  - 3% naCl at 30cc/hr   - Check Na  4 pm   - D/C horn    Na Cl tabs  2 grams q 6   Na goal 140- 150 for cerebral edema   - Replete K<4 and Mg <2    INFECTIOUS DISEASE  - Monitor  for fever     ENDOCRINE  Stroke Core measures as above     HEME/ONC - R peroneal DVT  Lovenox 40mg q day  ASA 81 mg qday   F/U DVT in 4 Days - 11/16    Dispo  - NSICU 64F. PMH: Hx of Cholecystitis s/p lap-roel, former smoker (13 pack years, quit 14 years ago). Presented 11/9 c/o dizziness nausea and vomiting since waking up from sleep. LKW 11/8 11:30PM Stroke code activated: NIHSS 0. CTH (+) for Acute infarct involving the left cerebellar hemisphere, CTP showed 5cc of penumbra in L hemisphere, with 4cc of core infarct. 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 No TNK OOW, No MT - due to low NIHSS. Admitted to stroke unit for further workup. 11/10 in AM, patient's MRI completed showing large cerebellar infarct with partial effacement of 4th ventricle and mass effect on the medulla and left cerebellar tonsillar herniation. On evaluation, patient was awake, attentive to voice though in mild distress from severe nausea. NIHSS now a 1 for dysmetria on LUE. NSICU consulted, hypertonic saline started. Patient upgraded to ICU for further monitoring. Etiology likely SHEFALI vs ESUS. Patient will be admitted to stroke unit for monitoring and further workup.      NEUROLOGY- - Etiology of  Cerebellar stroke - Non ICAD, embolic vs dissection   - Gen neuro checks q 4  - Stroke Code CTH/CTP/CTA: Completed and reviewed  - ASA 81mg q day/ Plavix off for now ( last Dose 11/10/24 )   -  PRU - 205- 11/11/24  - MRI/MRA neck  - No  vert artery dissection - Fat sat    - CT this am no heme and will then start plavix in am 75mg q day   - Fall and Aspiration precautions  - Physiatry eval/Physical therapy/Occupational therapy/Speech and Swallow  - Neurosurgery Consult - SOC/Hydro watch   - F/u HbA1c- 5.9 , TSH and Lipid panel- LDL- 66/ TSH - 1.77   -  TTE w bubble study- as above / PFO  +   - Meclizine 12.5 mg prn / Scopolamine  patch  1mg q three days   - hypertonic saline 3%  30cc/hr / goal Na 140-150;   - NaCl tabs 3 grams q 8   - C/w Atorvastatin 40mg daily  - C/w DVT PPX as below    CARDIOLOGY-  / HLD   #Hypertension  - Goal -160  - No PFO on GREGORIO or intramural thrombus   - hold home blood pressure medication for now  - Stroke Code EKG Results: NSR  #HLD - LDL results: 66  - high dose statin as above    PULMONOLOGY- Ex - smoker   - call provider if SPO2 < 95%    GATROENTEROLOGY  - Diet: NPO after MN for GREGORIO   - Zofran prn - N/V   - Senna / Miralax / MOM 30cc q 8 prn   - last BM -  11/13/24   - No GI prophylaxsis neeed    RENAL/ELECTROLYTES  - 3% naCl at 30cc/hr   - Check Na  4 pm   - D/C horn    Na Cl tabs  2 grams q 6   Na goal 140- 150 for cerebral edema   - Replete K<4 and Mg <2    INFECTIOUS DISEASE  - Monitor  for fever     ENDOCRINE  Stroke Core measures as above     HEME/ONC - R peroneal DVT  Lovenox 40mg q day  ASA 81 mg qday   F/U DVT in 4 Days - 11/16    Dispo  - NSICU

## 2024-11-16 LAB
ALBUMIN SERPL ELPH-MCNC: 3.5 G/DL — SIGNIFICANT CHANGE UP (ref 3.5–5.2)
ALP SERPL-CCNC: 90 U/L — SIGNIFICANT CHANGE UP (ref 30–115)
ALT FLD-CCNC: 93 U/L — HIGH (ref 0–41)
ANION GAP SERPL CALC-SCNC: 6 MMOL/L — LOW (ref 7–14)
ANION GAP SERPL CALC-SCNC: 7 MMOL/L — SIGNIFICANT CHANGE UP (ref 7–14)
AST SERPL-CCNC: 112 U/L — HIGH (ref 0–41)
BASOPHILS # BLD AUTO: 0.02 K/UL — SIGNIFICANT CHANGE UP (ref 0–0.2)
BASOPHILS NFR BLD AUTO: 0.3 % — SIGNIFICANT CHANGE UP (ref 0–1)
BILIRUB SERPL-MCNC: 0.4 MG/DL — SIGNIFICANT CHANGE UP (ref 0.2–1.2)
BUN SERPL-MCNC: 8 MG/DL — LOW (ref 10–20)
BUN SERPL-MCNC: 8 MG/DL — LOW (ref 10–20)
CALCIUM SERPL-MCNC: 8 MG/DL — LOW (ref 8.4–10.4)
CALCIUM SERPL-MCNC: 8.3 MG/DL — LOW (ref 8.4–10.4)
CHLORIDE SERPL-SCNC: 105 MMOL/L — SIGNIFICANT CHANGE UP (ref 98–110)
CHLORIDE SERPL-SCNC: 112 MMOL/L — HIGH (ref 98–110)
CO2 SERPL-SCNC: 26 MMOL/L — SIGNIFICANT CHANGE UP (ref 17–32)
CO2 SERPL-SCNC: 30 MMOL/L — SIGNIFICANT CHANGE UP (ref 17–32)
CREAT SERPL-MCNC: 0.6 MG/DL — LOW (ref 0.7–1.5)
CREAT SERPL-MCNC: <0.5 MG/DL — LOW (ref 0.7–1.5)
EGFR: 100 ML/MIN/1.73M2 — SIGNIFICANT CHANGE UP
EGFR: 110 ML/MIN/1.73M2 — SIGNIFICANT CHANGE UP
EOSINOPHIL # BLD AUTO: 0.22 K/UL — SIGNIFICANT CHANGE UP (ref 0–0.7)
EOSINOPHIL NFR BLD AUTO: 3.7 % — SIGNIFICANT CHANGE UP (ref 0–8)
GLUCOSE SERPL-MCNC: 104 MG/DL — HIGH (ref 70–99)
GLUCOSE SERPL-MCNC: 94 MG/DL — SIGNIFICANT CHANGE UP (ref 70–99)
HCT VFR BLD CALC: 36.6 % — LOW (ref 37–47)
HGB BLD-MCNC: 12.1 G/DL — SIGNIFICANT CHANGE UP (ref 12–16)
IMM GRANULOCYTES NFR BLD AUTO: 0.2 % — SIGNIFICANT CHANGE UP (ref 0.1–0.3)
LYMPHOCYTES # BLD AUTO: 1.91 K/UL — SIGNIFICANT CHANGE UP (ref 1.2–3.4)
LYMPHOCYTES # BLD AUTO: 31.9 % — SIGNIFICANT CHANGE UP (ref 20.5–51.1)
MAGNESIUM SERPL-MCNC: 2.3 MG/DL — SIGNIFICANT CHANGE UP (ref 1.8–2.4)
MCHC RBC-ENTMCNC: 27.1 PG — SIGNIFICANT CHANGE UP (ref 27–31)
MCHC RBC-ENTMCNC: 33.1 G/DL — SIGNIFICANT CHANGE UP (ref 32–37)
MCV RBC AUTO: 82.1 FL — SIGNIFICANT CHANGE UP (ref 81–99)
MONOCYTES # BLD AUTO: 0.52 K/UL — SIGNIFICANT CHANGE UP (ref 0.1–0.6)
MONOCYTES NFR BLD AUTO: 8.7 % — SIGNIFICANT CHANGE UP (ref 1.7–9.3)
NEUTROPHILS # BLD AUTO: 3.31 K/UL — SIGNIFICANT CHANGE UP (ref 1.4–6.5)
NEUTROPHILS NFR BLD AUTO: 55.2 % — SIGNIFICANT CHANGE UP (ref 42.2–75.2)
NRBC # BLD: 0 /100 WBCS — SIGNIFICANT CHANGE UP (ref 0–0)
PHOSPHATE SERPL-MCNC: 3.2 MG/DL — SIGNIFICANT CHANGE UP (ref 2.1–4.9)
PLATELET # BLD AUTO: 218 K/UL — SIGNIFICANT CHANGE UP (ref 130–400)
PMV BLD: 10.7 FL — HIGH (ref 7.4–10.4)
POTASSIUM SERPL-MCNC: 3.8 MMOL/L — SIGNIFICANT CHANGE UP (ref 3.5–5)
POTASSIUM SERPL-MCNC: 3.9 MMOL/L — SIGNIFICANT CHANGE UP (ref 3.5–5)
POTASSIUM SERPL-SCNC: 3.8 MMOL/L — SIGNIFICANT CHANGE UP (ref 3.5–5)
POTASSIUM SERPL-SCNC: 3.9 MMOL/L — SIGNIFICANT CHANGE UP (ref 3.5–5)
PROT SERPL-MCNC: 5.4 G/DL — LOW (ref 6–8)
RBC # BLD: 4.46 M/UL — SIGNIFICANT CHANGE UP (ref 4.2–5.4)
RBC # FLD: 12.9 % — SIGNIFICANT CHANGE UP (ref 11.5–14.5)
SODIUM SERPL-SCNC: 142 MMOL/L — SIGNIFICANT CHANGE UP (ref 135–146)
SODIUM SERPL-SCNC: 144 MMOL/L — SIGNIFICANT CHANGE UP (ref 135–146)
WBC # BLD: 5.99 K/UL — SIGNIFICANT CHANGE UP (ref 4.8–10.8)
WBC # FLD AUTO: 5.99 K/UL — SIGNIFICANT CHANGE UP (ref 4.8–10.8)

## 2024-11-16 PROCEDURE — 93970 EXTREMITY STUDY: CPT | Mod: 26

## 2024-11-16 PROCEDURE — 99231 SBSQ HOSP IP/OBS SF/LOW 25: CPT

## 2024-11-16 RX ORDER — FAMOTIDINE 20 MG/1
20 TABLET, FILM COATED ORAL DAILY
Refills: 0 | Status: DISCONTINUED | OUTPATIENT
Start: 2024-11-16 | End: 2024-11-17

## 2024-11-16 RX ORDER — SODIUM CHLORIDE 3 G/100ML
500 INJECTION, SOLUTION INTRAVENOUS
Refills: 0 | Status: DISCONTINUED | OUTPATIENT
Start: 2024-11-16 | End: 2024-11-16

## 2024-11-16 RX ADMIN — SODIUM CHLORIDE 3 GRAM(S): 9 INJECTION, SOLUTION INTRAMUSCULAR; INTRAVENOUS; SUBCUTANEOUS at 11:21

## 2024-11-16 RX ADMIN — SODIUM CHLORIDE 3 GRAM(S): 9 INJECTION, SOLUTION INTRAMUSCULAR; INTRAVENOUS; SUBCUTANEOUS at 23:59

## 2024-11-16 RX ADMIN — ENOXAPARIN SODIUM 40 MILLIGRAM(S): 30 INJECTION SUBCUTANEOUS at 05:42

## 2024-11-16 RX ADMIN — Medication 30 MILLILITER(S): at 05:42

## 2024-11-16 RX ADMIN — Medication 81 MILLIGRAM(S): at 11:21

## 2024-11-16 RX ADMIN — FAMOTIDINE 20 MILLIGRAM(S): 20 TABLET, FILM COATED ORAL at 13:48

## 2024-11-16 RX ADMIN — SCOPOLAMINE 1 PATCH: 1 PATCH, EXTENDED RELEASE TRANSDERMAL at 19:41

## 2024-11-16 RX ADMIN — Medication 40 MILLIGRAM(S): at 22:39

## 2024-11-16 RX ADMIN — Medication 2 TABLET(S): at 22:39

## 2024-11-16 RX ADMIN — SODIUM CHLORIDE 3 GRAM(S): 9 INJECTION, SOLUTION INTRAMUSCULAR; INTRAVENOUS; SUBCUTANEOUS at 05:42

## 2024-11-16 RX ADMIN — SODIUM CHLORIDE 3 GRAM(S): 9 INJECTION, SOLUTION INTRAMUSCULAR; INTRAVENOUS; SUBCUTANEOUS at 18:29

## 2024-11-16 RX ADMIN — POLYETHYLENE GLYCOL 3350 17 GRAM(S): 17 POWDER, FOR SOLUTION ORAL at 05:41

## 2024-11-16 RX ADMIN — SCOPOLAMINE 1 PATCH: 1 PATCH, EXTENDED RELEASE TRANSDERMAL at 06:54

## 2024-11-16 RX ADMIN — CHLORHEXIDINE GLUCONATE 1 APPLICATION(S): 1.2 RINSE ORAL at 05:41

## 2024-11-16 NOTE — PROGRESS NOTE ADULT - SUBJECTIVE AND OBJECTIVE BOX
64F PMHx of cholecystitis s/p lap roel, not on any medications, presents to ED for dizziness, nausea, and vomiting. LKW 11:30pm the night before. 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 Stroke code was called. NIHSS 0, still with dizziness and nausea. Patient was out of window for TNK and was deemed not a candidate for acute thrombectomy. received plavix load and admitted to stroke service now with worsening exam transferred to neurocritical care for further man agent and workup.    Hosp Day #8-  L cerebellar infarct / hydro   No events overnight         Allergies    penicillin (Hives)    Intolerances      PAST MEDICAL & SURGICAL HISTORY:  No pertinent past medical history  S/P cholecystectomy- 2019   H/O colonoscopy  2017        Allergies    penicillin (Hives)    ICU Vital Signs Last 24 Hrs  T(C): 36.6 (16 Nov 2024 04:00), Max: 36.6 (15 Nov 2024 08:00)  T(F): 97.9 (16 Nov 2024 04:00), Max: 97.9 (15 Nov 2024 12:00)  HR: 65 (16 Nov 2024 06:00) (61 - 95)  BP: 136/73 (16 Nov 2024 06:00) (112/69 - 150/79)  BP(mean): 96 (16 Nov 2024 06:00) (78 - 108)  RR: 14 (16 Nov 2024 06:00) (11 - 63)  SpO2: 96% (16 Nov 2024 06:00) (94% - 99%)    O2 Parameters below as of 16 Nov 2024 04:00  Patient On (Oxygen Delivery Method): room air      15 Nov 2024 07:01  -  16 Nov 2024 07:00  --------------------------------------------------------  IN:    IV PiggyBack: 50 mL    Oral Fluid: 820 mL    sodium chloride 3%: 720 mL  Total IN: 1590 mL    OUT:    Voided (mL): 1170 mL  Total OUT: 1170 mL    Total NET: 420 mL    MEDICATIONS  (STANDING):  aspirin  chewable 81 milliGRAM(s) Oral daily  atorvastatin 40 milliGRAM(s) Oral at bedtime  chlorhexidine 2% Cloths 1 Application(s) Topical <User Schedule>  enoxaparin Injectable 40 milliGRAM(s) SubCutaneous every 24 hours  magnesium hydroxide Suspension 30 milliLiter(s) Oral every 8 hours  polyethylene glycol 3350 17 Gram(s) Oral every 12 hours  scopolamine 1 mG/72 Hr(s) Patch 1 Patch Transdermal every 72 hours  senna 2 Tablet(s) Oral at bedtime  sodium chloride 3 Gram(s) Oral every 6 hours  sodium chloride 3%. 500 milliLiter(s) (30 mL/Hr) IV Continuous <Continuous>      MEDICATIONS  (PRN):  acetaminophen     Tablet .. 650 milliGRAM(s) Oral every 6 hours PRN Temp greater or equal to 38C (100.4F), Mild Pain (1 - 3)  meclizine 12.5 milliGRAM(s) Oral every 6 hours PRN Dizziness  ondansetron Injectable 4 milliGRAM(s) IV Push three times a day PRN Nausea and/or Vomiting      11-16    144  |  112[H]  |  8[L]  ----------------------------<  94  3.8   |  26  |  <0.5[L]    Ca    8.0[L]      16 Nov 2024 04:30  Phos  3.2     11-16  Mg     2.3     11-16    TPro  5.4[L]  /  Alb  3.5  /  TBili  0.4  /  DBili  x   /  AST  112[H]  /  ALT  93[H]  /  AlkPhos  90  11-16    TPro  6.0  /  Alb  3.6  /  TBili  0.5  /  DBili  x   /  AST  87[H]  /  ALT  65[H]  /  AlkPhos  100  11-15    TPro  5.7[L]  /  Alb  3.4[L]  /  TBili  0.4  /  DBili  x   /  AST  62[H]  /  ALT  45[H]  /  AlkPhos  95  11-14    TPro  6.2  /  Alb  3.8  /  TBili  0.3  /  DBili  x   /  AST  63[H]  /  ALT  38  /  AlkPhos  94  11-13    Stroke Measures  HGBA1C - 5.9  TSH - 1.7  LDL- 66    CT Head No Cont (11.15.24 @ 13:56) >  IMPRESSION:  Redemonstration of acute left cerebellar infarct without evidence for   hemorrhage.      Transesophageal Echocardiogram (11.14.24 @ 11:30) >  Summary:   1. Left ventricular ejection fraction, by visual estimation, is 55 to   60%.   2. Aortic valve is bicuspid.   3. Mild aortic valve stenosis.   4. No left atrial appendage thrombus and normal left atrial appendage   velocities.   5. Intact intra-atrial septum without shunt after injection of agitated   saline.   6. Normal left atrial size.   7. Color flow doppler and intravenous injection of agitated saline   demonstrates the presence of an intact intra atrial septum.   8. Normal global left ventricular systolic function         VA Duplex Lower Ext Vein Scan, Bilat (11.11.24 @ 09:17) >  IMPRESSION:    Right peroneal is thrombosed    No evidence of deep venous thrombosis in left lower extremity        CT Head No Cont (11.11.24 @ 05:07) >  IMPRESSION:    Stable appearance of the large left inferior cerebellar infarct. Stable   mass effect upon the fourth ventricle and medulla. Stable ventricular   size, without significant hydrocephalus.    CT Angio Neck w/ IV Cont (11.09.24 @ 19:38) >    IMPRESSION:  CT HEAD:  Acute infarct involving the left cerebellarhemisphere without evidence   for hemorrhage.    CTA HEAD/NECK:  Occluded left vertebral artery.     MR Angio Neck w/wo IV Cont (11.14.24 @ 19:11) >    IMPRESSION:    Interval development of patchy petechial hemorrhagic transformation in   the left inferior cerebellar infarct since the prior CT head from   11/13/2024. Persistent mass effect on the medulla and right cerebellum.    Improved patency of the previously occluded left vertebral artery (likely   in the V4 segment) with mild residual stenosis in the distal V4 segment.   No evidence of intramural hematoma to suggest dissection.          MR Head No Cont (11.10.24 @ 09:09) >    IMPRESSION:  Large acute infarct involving the inferomedial left cerebellum with mass   effect resulting in partial effacement of the fourth ventricle without   hydrocephalus. There is mass effect resulting in anterolateral   displacement of medulla as well as left cerebellar tonsillar herniation   through the foramen magnum.       12 Lead ECG (11.09.24 @ 19:30) >  Atrial Rate 85 BPM    P-R Interval 176 ms    QRS Duration 88 ms    Q-T Interval 378 ms    QTC Calculation(Bazett) 449 ms    P Axis 56 degrees    R Axis 65 degrees    T Axis 40 degrees    Diagnosis Line Normal sinus rhythm  Normal ECG    R peroneal DVT         PHYSICAL EXAM:    Constitutional: No Acute Distress     Neurological: Awake, alert oriented to person, place and time, Following Commands, PERRL, EOMI, No Gaze Preference, Face Symmetrical, Speech Fluent, No dysmetria, No ataxia, No nystagmus     Motor exam:          Upper extremity                         Delt     Bicep     Tricep    HG                                                 R         RUE drift 5/5                                               L          5/5 5/5 5/5 5/5          Lower extremity                        HF         KF        KE       DF         PF                                                  R        5/5 5/5 5/5 5/5 5/5                                               L         5/5 5/5 5/5 5/5 5/5                                                 Sensation: [X ] intact to light touch        Pulmonary: Clear to Auscultation, No rales, No rhonchi, No wheezes     Cardiovascular: S1, S2, Regular rate and rhythm     Gastrointestinal: Soft, Non-tender, Non-distended     Extremities: No calf tenderness

## 2024-11-16 NOTE — PROGRESS NOTE ADULT - ASSESSMENT
64F. PMH: Hx of Cholecystitis s/p lap-roel, former smoker (13 pack years, quit 14 years ago). Presented 11/9 c/o dizziness nausea and vomiting since waking up from sleep. LKW 11/8 11:30PM Stroke code activated: NIHSS 0. CTH (+) for Acute infarct involving the left cerebellar hemisphere, CTP showed 5cc of penumbra in L hemisphere, with 4cc of core infarct. 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 No TNK OOW, No MT - due to low NIHSS. Admitted to stroke unit for further workup. 11/10 in AM, patient's MRI completed showing large cerebellar infarct with partial effacement of 4th ventricle and mass effect on the medulla and left cerebellar tonsillar herniation. On evaluation, patient was awake, attentive to voice though in mild distress from severe nausea. NIHSS now a 1 for dysmetria on LUE. NSICU consulted, hypertonic saline started. Patient upgraded to ICU for further monitoring. Etiology likely HSEFALI vs ESUS. Patient will be admitted to stroke unit for monitoring and further workup.      NEUROLOGY- - Etiology of  Cerebellar stroke - Non ICAD, embolic vs dissection   - Gen neuro checks q 4  - Stroke Code CTH/CTP/CTA: Completed and reviewed  - ASA 81mg q day/ Plavix off for now ( last Dose 11/10/24 )   -  PRU - 205- 11/11/24  - MRI/MRA neck  - No  vert artery dissection - Fat sat    - CT this am no heme and will then start plavix in am 75mg q day   - Fall and Aspiration precautions  - Physiatry eval/Physical therapy/Occupational therapy/Speech and Swallow  - Neurosurgery Consult - SOC/Hydro watch   - F/u HbA1c- 5.9 , TSH and Lipid panel- LDL- 66/ TSH - 1.77   -  TTE w bubble study- as above / PFO  +   - Meclizine 12.5 mg prn / Scopolamine  patch  1mg q three days   - hypertonic saline 3%  30cc/hr / goal Na 140-150;   - NaCl tabs 3 grams q 8   - C/w Atorvastatin 40mg daily  - C/w DVT PPX as below    CARDIOLOGY-  / HLD   #Hypertension  - Goal -160  - No PFO on GREGORIO or intramural thrombus   - hold home blood pressure medication for now  - Stroke Code EKG Results: NSR  #HLD - LDL results: 66  - high dose statin as above    PULMONOLOGY- Ex - smoker   - call provider if SPO2 < 95%    GATROENTEROLOGY  - Diet: NPO after MN for GREGORIO   - Zofran prn - N/V   - Senna / Miralax / MOM 30cc q 8 prn   - last BM -  11/13/24   - No GI prophylaxsis neeed    RENAL/ELECTROLYTES  - 3% naCl at 30cc/hr   - Check Na  4 pm   - D/C horn    Na Cl tabs  2 grams q 6   Na goal 140- 150 for cerebral edema   - Replete K<4 and Mg <2    INFECTIOUS DISEASE  - Monitor  for fever     ENDOCRINE  Stroke Core measures as above     HEME/ONC - R peroneal DVT  Lovenox 40mg q day  ASA 81 mg qday   F/U DVT in 4 Days - 11/16    Dispo  - NSICU 64F. PMH: Hx of Cholecystitis s/p lap-roel, former smoker (13 pack years, quit 14 years ago). Presented 11/9 c/o dizziness nausea and vomiting since waking up from sleep. LKW 11/8 11:30PM Stroke code activated: NIHSS 0. CTH (+) for Acute infarct involving the left cerebellar hemisphere, CTP showed 5cc of penumbra in L hemisphere, with 4cc of core infarct. 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 No TNK OOW, No MT - due to low NIHSS. Admitted to stroke unit for further workup. 11/10 in AM, patient's MRI completed showing large cerebellar infarct with partial effacement of 4th ventricle and mass effect on the medulla and left cerebellar tonsillar herniation. On evaluation, patient was awake, attentive to voice though in mild distress from severe nausea. NIHSS now a 1 for dysmetria on LUE. NSICU consulted, hypertonic saline started. Patient upgraded to ICU for further monitoring. Etiology likely SHEFALI vs ESUS. Patient will be admitted to stroke unit for monitoring and further workup.      NEUROLOGY- - Etiology of  Cerebellar stroke - Non ICAD likely embolic   - Gen neuro checks q 4  - Stroke Code CTH/CTP/CTA: Completed and reviewed  - ASA 81mg q day/ Plavix off for now ( last Dose 11/10/24 )   -  PRU - 205- 11/11/24  - MRI/MRA neck  - No vert artery dissection - Fat sat    - CT this am no heme and will then start plavix in am 75mg q day   - Fall and Aspiration precautions  - Physiatry eval/Physical therapy/Occupational therapy/Speech and Swallow  - F/u HbA1c- 5.9 , TSH and Lipid panel- LDL- 66/ TSH - 1.77     - Scopolamine  patch  1mg q three days   -  D/C hypertonic saline 3%  30cc/hr for cerebral edema  / goal Na 140-150;   - NaCl tabs 3 grams q 8   - C/w Atorvastatin 40mg daily- monitor LFT   - C/w DVT PPX as below    CARDIOLOGY-  / HLD / Hypertension  - Goal - <150  - No PFO on GREGORIO or intramural thrombus   - hold home blood pressure medication for now  -  EKG Results: NSR   - LDL results: 66  - Decrease Statin to 40mg  q day if LFT > 4x nl - D/C statin       PULMONOLOGY- Ex - smoker   - call provider if SPO2 < 95%    GATROENTEROLOGY  - Diet: easy to chew - DASH diet   - Zofran prn - N/V   - Senna / Miralax / MOM 30cc q 8 prn   - last BM -  11/16/24   - No GI prophylaxis needed    RENAL/ELECTROLYTES  - off 3 % and check SMA-7 at 4 pm   - D/C horn   -Na Cl tabs  2 grams q 6   - Na goal 140- 150 for cerebral edema   - Replete K<4 and Mg <2    INFECTIOUS DISEASE  - Monitor  for fever     ENDOCRINE  Stroke Core measures as above     HEME/ONC - R peroneal DVT  Lovenox 40mg q day  ASA 81 mg qday   F/U DVT in 4 Days - 11/16- R peroneal DVT with NO propagation   L SCD      Dispo  - NSICU 64F. PMH: Hx of Cholecystitis s/p lap-roel, former smoker (13 pack years, quit 14 years ago). Presented 11/9 c/o dizziness nausea and vomiting since waking up from sleep. LKW 11/8 11:30PM Stroke code activated: NIHSS 0. CTH (+) for Acute infarct involving the left cerebellar hemisphere, CTP showed 5cc of penumbra in L hemisphere, with 4cc of core infarct. 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 No TNK OOW, No MT - due to low NIHSS. Admitted to stroke unit for further workup. 11/10 in AM, patient's MRI completed showing large cerebellar infarct with partial effacement of 4th ventricle and mass effect on the medulla and left cerebellar tonsillar herniation. On evaluation, patient was awake, attentive to voice though in mild distress from severe nausea. NIHSS now a 1 for dysmetria on LUE. NSICU consulted, hypertonic saline started. Patient upgraded to ICU for further monitoring. Etiology likely SHEFALI vs ESUS. Patient will be admitted to stroke unit for monitoring and further workup.      NEUROLOGY- - Etiology of  Cerebellar stroke - Non ICAD likely embolic   - Gen neuro checks q 4  - Stroke Code CTH/CTP/CTA: Completed and reviewed  - ASA 81mg q day/ Plavix off for now ( last Dose 11/10/24 )   -  PRU - 205- 11/11/24  - MRI/MRA neck  - No vert artery dissection - Fat sat    - CT this am no heme and will then start plavix in am 75mg q day   - Fall and Aspiration precautions  - Physiatry eval/Physical therapy/Occupational therapy/Speech and Swallow  - F/u HbA1c- 5.9 , TSH and Lipid panel- LDL- 66/ TSH - 1.77     - Scopolamine  patch  1mg q three days   -  D/C hypertonic saline 3%  30cc/hr for cerebral edema  / goal Na 140-150;   - NaCl tabs 3 grams q 8   - C/w Atorvastatin 40mg daily- monitor LFT   - C/w DVT PPX as below    CARDIOLOGY-  / HLD / Hypertension  - Goal - <150  - Loop recorder evaluation   - No PFO on GREGORIO or intramural thrombus   - hold home blood pressure medication for now  -  EKG Results: NSR   - LDL results: 66  - Decrease Statin to 40mg  q day if LFT > 4x nl - D/C statin       PULMONOLOGY- Ex - smoker   - call provider if SPO2 < 95%    GI-  Mildly tranaminitis/ Dyspepsia  - Pepcid 20mg q day   - If LFT > 4 X nl - D/C statin   - No evidence of scopolamine causing increased LFT   - Diet: easy to chew - DASH diet   - Zofran prn - N/V   - Senna / Miralax / MOM 30cc q 8 prn   - last BM -  11/16/24   - No GI prophylaxis needed    RENAL/ELECTROLYTES  - off 3 % and check SMA-7 at 4 pm   - D/C horn   -Na Cl tabs  3 grams q 6   - Na goal 140- 150 for cerebral edema   - Replete K<4 and Mg <2    INFECTIOUS DISEASE  - Monitor  for fever     ENDOCRINE  Stroke Core measures as above     HEME/ONC - R peroneal DVT  Lovenox 40mg q day  ASA 81 mg qday   F/U DVT in 4 Days - 11/16- R peroneal DVT with NO propagation   L SCD      Dispo  - 3 E with Tele

## 2024-11-16 NOTE — CONSULT NOTE ADULT - SUBJECTIVE AND OBJECTIVE BOX
Patient is a 64y old  Female who presents with a chief complaint of Stroke (2024 07:48)    HPI:  Spoke with patient via  #[id number]  Patient is a 64-year-old female with PMHx of cholecystitis s/p lap roel, not on any medications, presents to ED for dizziness, nausea, and vomiting after waking up this morning. Last know well was the night before at 11:30pm. Patient with several days of cough, body aches, headache, and generalized weakness. Patient states she feels more dizzy with movement with unsteady gait. When lying in bed talking to author, she feels less dizzy. However, she still feels nauseas. She has right sided tinnitus for six years but it is never associated with dizziness. Patient denies hx of HTN, DM, HLD. She used to smoke tobacco 1/3 pack per day for 40 years, quit 14 years ago. Patient at baseline is independent living on her own. 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    PAST MEDICAL & SURGICAL HISTORY:  No pertinent past medical history      S/P cholecystectomy      H/O colonoscopy        PREVIOUS DIAGNOSTIC TESTING:      ECHO  FINDINGS:  < from: TTE Echo Complete w/ Contrast w/o Doppler (24 @ 08:21) >    Summary:   1. Left ventricular ejection fraction, by visual estimation, is 60 to   65%.   2. Normal left ventricular size and wall thicknesses, with normal   systolic and diastolic function.   3. Intravenous injection of agitated saline demonstrates the presence of   a patent foramen ovale.   4. The aortic valve was not well visualized. Peak transaortic gradient   equals 31.6 mmHg, mean transaortic gradient equals 16.3 mmHg, the   calculated aortic valve area equals 1.30 cm² by the continuity equation   consistent with moderate aortic stenosis.   5. Mild tricuspid regurgitation.   6. Estimated pulmonary artery systolic pressure is 38.5 mmHg assuming a   right atrial pressure of 8 mmHg, which is consistent with borderline   pulmonary hypertension.    < end of copied text >      STRESS  FINDINGS:    CATHETERIZATION  FINDINGS:    ELECTROPHYSIOLOGY STUDY  FINDINGS:    CAROTID ULTRASOUND:  FINDINGS    VENOUS DUPLEX SCAN:  FINDINGS:    CHEST CT PULMONARY ANGIO with IV Contrast:  FINDINGS:    MEDICATIONS  (STANDING):  aspirin  chewable 81 milliGRAM(s) Oral daily  atorvastatin 40 milliGRAM(s) Oral at bedtime  chlorhexidine 2% Cloths 1 Application(s) Topical <User Schedule>  enoxaparin Injectable 40 milliGRAM(s) SubCutaneous every 24 hours  famotidine    Tablet 20 milliGRAM(s) Oral daily  polyethylene glycol 3350 17 Gram(s) Oral every 12 hours  scopolamine 1 mG/72 Hr(s) Patch 1 Patch Transdermal every 72 hours  senna 2 Tablet(s) Oral at bedtime  sodium chloride 3 Gram(s) Oral every 6 hours    MEDICATIONS  (PRN):  acetaminophen     Tablet .. 650 milliGRAM(s) Oral every 6 hours PRN Temp greater or equal to 38C (100.4F), Mild Pain (1 - 3)  ondansetron Injectable 4 milliGRAM(s) IV Push three times a day PRN Nausea and/or Vomiting      FAMILY HISTORY: No significant hx    SOCIAL HISTORY: No smoking, ETOH or illicit drug use    Past Surgical History: see above    Allergies:  penicillin (Hives)      REVIEW OF SYSTEMS: No CP, palpitations, dizziness or SOB       Vital Signs Last 24 Hrs  T(C): 36.5 (2024 12:00), Max: 36.8 (2024 08:00)  T(F): 97.7 (2024 12:00), Max: 98.3 (2024 08:00)  HR: 69 (2024 12:00) (61 - 95)  BP: 121/73 (2024 12:00) (103/61 - 150/79)  BP(mean): 83 (2024 12:00) (75 - 108)  RR: 20 (:00) (11 - 38)  SpO2: 99% (:00) (94% - 100%)    Parameters below as of 2024 12:00  Patient On (Oxygen Delivery Method): room air        PHYSICAL EXAM:  GENERAL: In no apparent distress, well nourished, and hydrated.  NECK: Supple, No JVD   HEART: Regular rate and rhythm; No murmurs, rubs, or gallops.  PULMONARY: Clear to auscultation and perfusion.  No rales, wheezing, or rhonchi bilaterally.  EXTREMITIES:  2+ Peripheral Pulses, no LE edema BL  NEUROLOGICAL: Grossly nonfocal      INTERPRETATION OF TELEMETRY: NSR 70 bpm    ECG:  < from: 12 Lead ECG (24 @ 19:30) >  Ventricular Rate 85 BPM    Atrial Rate 85 BPM    P-R Interval 176 ms    QRS Duration 88 ms    Q-T Interval 378 ms    QTC Calculation(Bazett) 449 ms    P Axis 56 degrees    R Axis 65 degrees    T Axis 40 degrees    Diagnosis Line Normal sinus rhythm  Normal ECG    Confirmed by Karla Washington ([id number]) on 11/10/2024 11:49:11 AM    < end of copied text >      I&O's Detail    15 Nov 2024 07:01  -  2024 07:00  --------------------------------------------------------  IN:    IV PiggyBack: 50 mL    Oral Fluid: 820 mL    sodium chloride 3%: 720 mL  Total IN: 1590 mL    OUT:    Voided (mL): 1170 mL  Total OUT: 1170 mL    Total NET: 420 mL      2024 07:01  -  2024 12:56  --------------------------------------------------------  IN:    Oral Fluid: 300 mL    sodium chloride 3%: 30 mL    sodium chloride 3%: 60 mL  Total IN: 390 mL    OUT:    Voided (mL): 400 mL  Total OUT: 400 mL    Total NET: -10 mL          LABS:                        12.1   5.99  )-----------( 218      ( 2024 04:30 )             36.6         144  |  112[H]  |  8[L]  ----------------------------<  94  3.8   |  26  |  <0.5[L]    Ca    8.0[L]      2024 04:30  Phos  3.2       Mg     2.3         TPro  5.4[L]  /  Alb  3.5  /  TBili  0.4  /  DBili  x   /  AST  112[H]  /  ALT  93[H]  /  AlkPhos  90            Urinalysis Basic - ( 2024 04:30 )    Color: x / Appearance: x / SG: x / pH: x  Gluc: 94 mg/dL / Ketone: x  / Bili: x / Urobili: x   Blood: x / Protein: x / Nitrite: x   Leuk Esterase: x / RBC: x / WBC x   Sq Epi: x / Non Sq Epi: x / Bacteria: x      BNP  I&O's Detail    15 Nov 2024 07:01  -  2024 07:00  --------------------------------------------------------  IN:    IV PiggyBack: 50 mL    Oral Fluid: 820 mL    sodium chloride 3%: 720 mL  Total IN: 1590 mL    OUT:    Voided (mL): 1170 mL  Total OUT: 1170 mL    Total NET: 420 mL      2024 07:01  -  2024 12:56  --------------------------------------------------------  IN:    Oral Fluid: 300 mL    sodium chloride 3%: 30 mL    sodium chloride 3%: 60 mL  Total IN: 390 mL    OUT:    Voided (mL): 400 mL  Total OUT: 400 mL    Total NET: -10 mL        Daily     Daily Weight in k (2024 11:02)    RADIOLOGY & ADDITIONAL STUDIES:
64F PMHx of cholecystitis s/p lap roel, not on any medications, presents to ED for dizziness, nausea, and vomiting. LKW 11:30pm the night before. 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 Stroke code was called. NIHSS 0, still with dizziness and nausea. Patient was out of window for TNK and was deemed not a candidate for acute thrombectomy. received plavix load and admitted to stroke service now with worsening exam transferred to neurocritical care for further man agent and workup.        REASON FOR CONSULT: ***    CONSULT REQUESTED BY: ***    Patient is a 64y old  Female who presents with a chief complaint of Stroke (10 Nov 2024 12:12)      BRIEF HOSPITAL COURSE: ***    Events last 24 hours: ***    PAST MEDICAL & SURGICAL HISTORY:  No pertinent past medical history      S/P cholecystectomy      H/O colonoscopy  2017        Allergies    penicillin (Hives)    Intolerances      FAMILY HISTORY:        Medications:        meclizine 12.5 milliGRAM(s) Oral every 6 hours PRN  ondansetron Injectable 4 milliGRAM(s) IV Push three times a day PRN      aspirin  chewable 81 milliGRAM(s) Oral daily  enoxaparin Injectable 40 milliGRAM(s) SubCutaneous every 24 hours        atorvastatin 80 milliGRAM(s) Oral at bedtime    sodium chloride 0.9%. 1000 milliLiter(s) IV Continuous <Continuous>  sodium chloride 3%. 500 milliLiter(s) IV Continuous <Continuous>                ICU Vital Signs Last 24 Hrs  T(C): 37.6 (10 Nov 2024 07:47), Max: 37.6 (10 Nov 2024 07:47)  T(F): 99.7 (10 Nov 2024 07:47), Max: 99.7 (10 Nov 2024 07:47)  HR: 66 (10 Nov 2024 07:47) (60 - 86)  BP: 167/78 (10 Nov 2024 07:47) (103/57 - 167/78)  BP(mean): --  ABP: --  ABP(mean): --  RR: 18 (10 Nov 2024 04:30) (18 - 18)  SpO2: 98% (10 Nov 2024 07:47) (96% - 99%)    O2 Parameters below as of 10 Nov 2024 04:30  Patient On (Oxygen Delivery Method): room air          Vital Signs Last 24 Hrs  T(C): 37.6 (10 Nov 2024 07:47), Max: 37.6 (10 Nov 2024 07:47)  T(F): 99.7 (10 Nov 2024 07:47), Max: 99.7 (10 Nov 2024 07:47)  HR: 66 (10 Nov 2024 07:47) (60 - 86)  BP: 167/78 (10 Nov 2024 07:47) (103/57 - 167/78)  BP(mean): --  RR: 18 (10 Nov 2024 04:30) (18 - 18)  SpO2: 98% (10 Nov 2024 07:47) (96% - 99%)    Parameters below as of 10 Nov 2024 04:30  Patient On (Oxygen Delivery Method): room air            I&O's Detail        LABS:                        15.0   8.85  )-----------( 204      ( 09 Nov 2024 16:25 )             45.7     11-10    140  |  109  |  11  ----------------------------<  108[H]  4.0   |  19  |  0.6[L]    Ca    8.1[L]      10 Nov 2024 08:29    TPro  6.3  /  Alb  3.8  /  TBili  0.7  /  DBili  0.2  /  AST  28  /  ALT  22  /  AlkPhos  83  11-10          CAPILLARY BLOOD GLUCOSE          Urinalysis Basic - ( 10 Nov 2024 08:29 )    Color: x / Appearance: x / SG: x / pH: x  Gluc: 108 mg/dL / Ketone: x  / Bili: x / Urobili: x   Blood: x / Protein: x / Nitrite: x   Leuk Esterase: x / RBC: x / WBC x   Sq Epi: x / Non Sq Epi: x / Bacteria: x  
  HISTORY OF PRESENT ILLNESS:   64-year-old female with PMHx of cholecystitis s/p lap roel, not on any medications, presents to ED for dizziness, nausea, and vomiting after waking up this morning. Last know well was the night before at 11:30pm. Patient with several days of cough, body aches, headache, and generalized weakness. Patient states she feels more dizzy with movement with unsteady gait. When lying in bed talking to author, she feels less dizzy. However, she still feels nauseas. She has right sided tinnitus for six years but it is never associated with dizziness. Patient denies hx of HTN, DM, HLD. She used to smoke tobacco 1/3 pack per day for 40 years, quit 14 years ago. Patient at baseline is independent living on her own.       pt was found to  have Left Cerebellar infarct on CT and Left vertebral artery occlusion on CTA    pt seen and examined at bedside pt is alert , follows commands ,     PAST MEDICAL & SURGICAL HISTORY:  No pertinent past medical history      S/P cholecystectomy      H/O colonoscopy  2017        FAMILY HISTORY:      SOCIAL HISTORY:  Tobacco Use:  EtOH use:   Substance:    Allergies    penicillin (Hives)    Intolerances        REVIEW OF SYSTEMS        MEDICATIONS:  Antibiotics:    Neuro:  meclizine 12.5 milliGRAM(s) Oral every 6 hours PRN  ondansetron Injectable 4 milliGRAM(s) IV Push three times a day PRN    Anticoagulation:  aspirin  chewable 81 milliGRAM(s) Oral daily  enoxaparin Injectable 40 milliGRAM(s) SubCutaneous every 24 hours    OTHER:  atorvastatin 80 milliGRAM(s) Oral at bedtime  mannitol 20% IVPB 18 Gram(s) IV Intermittent every 6 hours    IVF:  sodium chloride 3%. 500 milliLiter(s) IV Continuous <Continuous>  sodium chloride 3%. 500 milliLiter(s) IV Continuous <Continuous>      Vital Signs Last 24 Hrs  T(C): 36.9 (10 Nov 2024 12:35), Max: 37.6 (10 Nov 2024 07:47)  T(F): 98.4 (10 Nov 2024 12:35), Max: 99.7 (10 Nov 2024 07:47)  HR: 72 (10 Nov 2024 13:30) (60 - 86)  BP: 125/66 (10 Nov 2024 13:30) (103/57 - 167/78)  BP(mean): --  RR: 18 (10 Nov 2024 13:30) (18 - 18)  SpO2: 99% (10 Nov 2024 13:30) (96% - 99%)    Parameters below as of 10 Nov 2024 13:30  Patient On (Oxygen Delivery Method): room air        PHYSICAL EXAM:  ALert, Follows commands   A&OX3, PERRL   No facial asymmetry   MAEX4   MS bilateral UE;s equal         bilateral LE;s equal       LABS:                        15.0   8.85  )-----------( 204      ( 09 Nov 2024 16:25 )             45.7     11-10    140  |  109  |  11  ----------------------------<  108[H]  4.0   |  19  |  0.6[L]    Ca    8.1[L]      10 Nov 2024 08:29    TPro  6.3  /  Alb  3.8  /  TBili  0.7  /  DBili  0.2  /  AST  28  /  ALT  22  /  AlkPhos  83  11-10      Urinalysis Basic - ( 10 Nov 2024 08:29 )    Color: x / Appearance: x / SG: x / pH: x  Gluc: 108 mg/dL / Ketone: x  / Bili: x / Urobili: x   Blood: x / Protein: x / Nitrite: x   Leuk Esterase: x / RBC: x / WBC x   Sq Epi: x / Non Sq Epi: x / Bacteria:     RADIOLOGY & ADDITIONAL STUDIES:  < from: CT Head No Cont (11.09.24 @ 19:34) >  IMPRESSION:  CT HEAD:  Acute infarct involving the left cerebellar hemisphere without evidence   for hemorrhage.    CTA HEAD/NECK:  Occluded left vertebral artery.      A/p               64 yr old female with N/V and dizziness                Found to have Left cerebellar infarct                Care per neurocritical care team                SOC / hydro watch                  
HPI:  64-year-old female with PMHx of cholecystitis s/p lap roel, not on any medications, presents to ED for dizziness, nausea, and vomiting after waking up this morning. Last know well was the night before at 11:30pm. Patient with several days of cough, body aches, headache, and generalized weakness. Patient states she feels more dizzy with movement with unsteady gait. When lying in bed talking to author, she feels less dizzy. However, she still feels nauseas. She has right sided tinnitus for six years but it is never associated with dizziness. Patient denies hx of HTN, DM, HLD. She used to smoke tobacco 1/3 pack per day for 40 years, quit 14 years ago. Patient at baseline is independent living on her own.      < from: MR Head No Cont (11.10.24 @ 09:09) >  IMPRESSION:  Large acute infarct involving the inferomedial left cerebellum with mass   effect resulting in partial effacement of the fourth ventricle without   hydrocephalus. There is mass effect resulting in anterolateral   displacement of medulla as well as left cerebellar tonsillar herniation   through the foramen magnum.    --- End of Report ---    < end of copied text >        PAST MEDICAL & SURGICAL HISTORY:  No pertinent past medical history      S/P cholecystectomy      H/O colonoscopy  2017          Hospital Course:    TODAY'S SUBJECTIVE & REVIEW OF SYMPTOMS:     Constitutional WNL   Cardio WNL   Resp WNL   GI nausea   Heme WNL  Endo WNL  Skin WNL  MSK WNL  Neuro dizziness, gait ataxia   Cognitive WNL  Psych WNL      MEDICATIONS  (STANDING):  aspirin  chewable 81 milliGRAM(s) Oral daily  atorvastatin 80 milliGRAM(s) Oral at bedtime  clopidogrel Tablet 75 milliGRAM(s) Oral daily  enoxaparin Injectable 40 milliGRAM(s) SubCutaneous every 24 hours  sodium chloride 0.9%. 1000 milliLiter(s) (60 mL/Hr) IV Continuous <Continuous>  sodium chloride 3% Bolus 250 milliLiter(s) IV Bolus once  sodium chloride 3%. 500 milliLiter(s) (30 mL/Hr) IV Continuous <Continuous>    MEDICATIONS  (PRN):  meclizine 12.5 milliGRAM(s) Oral every 6 hours PRN Dizziness  ondansetron Injectable 4 milliGRAM(s) IV Push three times a day PRN Nausea and/or Vomiting      FAMILY HISTORY:      Allergies    penicillin (Hives)    Intolerances        SOCIAL HISTORY:    [  ] Etoh  [  ] Smoking  [  ] Substance abuse     Home Environment:  [   ] Home Alone  [ x  ] Lives with Family  [   ] Home Health Aid    Dwelling:  [   ] Apartment  [ x  ] Private House  [   ] Adult Home  [   ] Skilled Nursing Facility      [   ] Short Term  [   ] Long Term  [ x  ] Stairs       Elevator [   ]    FUNCTIONAL STATUS PTA: (Check all that apply)  Ambulation: [  x  ]Independent    [   ] Dependent     [   ] Non-Ambulatory  Assistive Device: [   ] SA Cane  [   ]  Q Cane  [   ] Walker  [   ]  Wheelchair  ADL : [ x  ] Independent  [    ]  Dependent       Vital Signs Last 24 Hrs  T(C): 37.6 (10 Nov 2024 07:47), Max: 37.6 (10 Nov 2024 07:47)  T(F): 99.7 (10 Nov 2024 07:47), Max: 99.7 (10 Nov 2024 07:47)  HR: 66 (10 Nov 2024 07:47) (60 - 86)  BP: 167/78 (10 Nov 2024 07:47) (103/57 - 167/78)  BP(mean): --  RR: 18 (10 Nov 2024 04:30) (18 - 18)  SpO2: 98% (10 Nov 2024 07:47) (96% - 99%)    Parameters below as of 10 Nov 2024 04:30  Patient On (Oxygen Delivery Method): room air          PHYSICAL EXAM: Awake & Alert  GENERAL: NAD  HEAD:  Normocephalic  CHEST/LUNG: Clear   HEART: S1S2+  ABDOMEN: Soft, Nontender  EXTREMITIES:  no calf tenderness    NERVOUS SYSTEM:  Cranial Nerves 2-12 intact [   ] Abnormal  [   ]  ROM: WFL all extremities [ x  ]  Abnormal [   ]  Motor Strength: WFL all extremities  [ x  ]  Abnormal [   ]  Sensation: intact to light touch [x  ] Abnormal [   ]    FUNCTIONAL STATUS:  Bed Mobility: Independent [   ]  Supervision [   ]  Needs Assistance [ x  ]  N/A [   ]  Transfers: Independent [   ]  Supervision [   ]  Needs Assistance [   ]  N/A [   ]   Ambulation: Independent [   ]  Supervision [   ]  Needs Assistance [   ]  N/A [   ]  ADL: Independent [   ] Requires Assistance [   ] N/A [   ]      LABS:                        15.0   8.85  )-----------( 204      ( 09 Nov 2024 16:25 )             45.7     11-10    140  |  109  |  11  ----------------------------<  108[H]  4.0   |  19  |  0.6[L]    Ca    8.1[L]      10 Nov 2024 08:29    TPro  6.3  /  Alb  3.8  /  TBili  0.7  /  DBili  0.2  /  AST  28  /  ALT  22  /  AlkPhos  83  11-10      Urinalysis Basic - ( 10 Nov 2024 08:29 )    Color: x / Appearance: x / SG: x / pH: x  Gluc: 108 mg/dL / Ketone: x  / Bili: x / Urobili: x   Blood: x / Protein: x / Nitrite: x   Leuk Esterase: x / RBC: x / WBC x   Sq Epi: x / Non Sq Epi: x / Bacteria: x        RADIOLOGY & ADDITIONAL STUDIES:

## 2024-11-16 NOTE — CONSULT NOTE ADULT - ASSESSMENT
64F PMHx of cholecystitis s/p lap roel, not on any medications, presents to ED for dizziness, nausea, and vomiting. LKW 11:30pm the night before. 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 Stroke code was called. NIHSS 0, still with dizziness and nausea. Patient was out of window for TNK and was deemed not a candidate for acute thrombectomy. received plavix load and admitted to stroke service now with worsening exam transferred to neurocritical care for further man agent and workup.    Impression:  Acute Infarct    Plan:  - Patient agreeable to ILR  - Will plan for tentative add on Monday 11/18  - Cont tele monitoring  - Monitor electrolytes, maintain WNL  - Will follow
IMPRESSION: Rehab of L cerebellar stroke / gait ataxia / cholecystitis s/p lap roel    PRECAUTIONS: [   ] Cardiac  [   ] Respiratory  [   ] Seizures [   ] Contact Isolation  [   ] Droplet Isolation  [   ] Other    Weight Bearing Status:     RECOMMENDATION:    Out of Bed to Chair     DVT/Decubiti Prophylaxis    REHAB PLAN:     [   x ] Bedside P/T 3-5 times a week   [ x   ]   Bedside O/T  2-3 times a week             [    ] Speech Therapy               [    ]  No Rehab Therapy Indicated   Conditioning/ROM                                    ADL  Bed Mobility                                               Conditioning/ROM  Transfers                                                     Bed Mobility  Sitting /Standing Balance                         Transfers                                        Gait Training                                               Sitting/Standing Balance  Stair Training [   ]Applicable                    Home equipment Eval                                                                        Splinting  [   ] Only      GOALS:   ADL   [ x   ]   Independent                    Transfers  [ x   ] Independent                          Ambulation  [  x  ] Independent     [   x  ] With device                            [    ]  CG                                                         [    ]  CG                                                                  [    ] CG                            [    ] Min A                                                   [    ] Min A                                                              [    ] Min  A          DISCHARGE PLAN:   [    ]  Good candidate for Intensive Rehabilitation/Hospital based                                             Will tolerate 3hrs Intensive Rehab Daily                                       [     ]  Short Term Rehab in Skilled Nursing Facility                                       [     ]  Home with Outpatient or  services                                         [  x   ]  Possible Candidate for Intensive Hospital based Rehab

## 2024-11-16 NOTE — CONSULT NOTE ADULT - NS ATTEND AMEND GEN_ALL_CORE FT
Tele  Cardio/Neuro f-up  Echo  Discussed need for long-term cardiac rhythm monitoring. Had a detailed discussion about options of [facility]+ ILR vs ILR. AFter evaluating pros-cons, patient/family opted for ILR, which is a reasonable approach. We will proceed with ILR placement.

## 2024-11-17 LAB
ALBUMIN SERPL ELPH-MCNC: 3.6 G/DL — SIGNIFICANT CHANGE UP (ref 3.5–5.2)
ALP SERPL-CCNC: 100 U/L — SIGNIFICANT CHANGE UP (ref 30–115)
ALT FLD-CCNC: 80 U/L — HIGH (ref 0–41)
ANION GAP SERPL CALC-SCNC: 7 MMOL/L — SIGNIFICANT CHANGE UP (ref 7–14)
AST SERPL-CCNC: 68 U/L — HIGH (ref 0–41)
BASOPHILS # BLD AUTO: 0.04 K/UL — SIGNIFICANT CHANGE UP (ref 0–0.2)
BASOPHILS NFR BLD AUTO: 0.6 % — SIGNIFICANT CHANGE UP (ref 0–1)
BILIRUB SERPL-MCNC: 0.4 MG/DL — SIGNIFICANT CHANGE UP (ref 0.2–1.2)
BUN SERPL-MCNC: 11 MG/DL — SIGNIFICANT CHANGE UP (ref 10–20)
CALCIUM SERPL-MCNC: 8.4 MG/DL — SIGNIFICANT CHANGE UP (ref 8.4–10.5)
CHLORIDE SERPL-SCNC: 107 MMOL/L — SIGNIFICANT CHANGE UP (ref 98–110)
CO2 SERPL-SCNC: 28 MMOL/L — SIGNIFICANT CHANGE UP (ref 17–32)
CREAT SERPL-MCNC: 0.6 MG/DL — LOW (ref 0.7–1.5)
EGFR: 100 ML/MIN/1.73M2 — SIGNIFICANT CHANGE UP
EOSINOPHIL # BLD AUTO: 0.18 K/UL — SIGNIFICANT CHANGE UP (ref 0–0.7)
EOSINOPHIL NFR BLD AUTO: 2.8 % — SIGNIFICANT CHANGE UP (ref 0–8)
GLUCOSE SERPL-MCNC: 104 MG/DL — HIGH (ref 70–99)
HCT VFR BLD CALC: 36.9 % — LOW (ref 37–47)
HGB BLD-MCNC: 12.1 G/DL — SIGNIFICANT CHANGE UP (ref 12–16)
IMM GRANULOCYTES NFR BLD AUTO: 0.3 % — SIGNIFICANT CHANGE UP (ref 0.1–0.3)
LIDOCAIN IGE QN: 22 U/L — SIGNIFICANT CHANGE UP (ref 7–60)
LYMPHOCYTES # BLD AUTO: 1.59 K/UL — SIGNIFICANT CHANGE UP (ref 1.2–3.4)
LYMPHOCYTES # BLD AUTO: 24.9 % — SIGNIFICANT CHANGE UP (ref 20.5–51.1)
MAGNESIUM SERPL-MCNC: 2.5 MG/DL — HIGH (ref 1.8–2.4)
MCHC RBC-ENTMCNC: 27.4 PG — SIGNIFICANT CHANGE UP (ref 27–31)
MCHC RBC-ENTMCNC: 32.8 G/DL — SIGNIFICANT CHANGE UP (ref 32–37)
MCV RBC AUTO: 83.7 FL — SIGNIFICANT CHANGE UP (ref 81–99)
MONOCYTES # BLD AUTO: 0.51 K/UL — SIGNIFICANT CHANGE UP (ref 0.1–0.6)
MONOCYTES NFR BLD AUTO: 8 % — SIGNIFICANT CHANGE UP (ref 1.7–9.3)
NEUTROPHILS # BLD AUTO: 4.04 K/UL — SIGNIFICANT CHANGE UP (ref 1.4–6.5)
NEUTROPHILS NFR BLD AUTO: 63.4 % — SIGNIFICANT CHANGE UP (ref 42.2–75.2)
NRBC # BLD: 0 /100 WBCS — SIGNIFICANT CHANGE UP (ref 0–0)
PLATELET # BLD AUTO: 231 K/UL — SIGNIFICANT CHANGE UP (ref 130–400)
PMV BLD: 10.9 FL — HIGH (ref 7.4–10.4)
POTASSIUM SERPL-MCNC: 3.5 MMOL/L — SIGNIFICANT CHANGE UP (ref 3.5–5)
POTASSIUM SERPL-SCNC: 3.5 MMOL/L — SIGNIFICANT CHANGE UP (ref 3.5–5)
PROT SERPL-MCNC: 5.7 G/DL — LOW (ref 6–8)
RBC # BLD: 4.41 M/UL — SIGNIFICANT CHANGE UP (ref 4.2–5.4)
RBC # FLD: 12.9 % — SIGNIFICANT CHANGE UP (ref 11.5–14.5)
SODIUM SERPL-SCNC: 142 MMOL/L — SIGNIFICANT CHANGE UP (ref 135–146)
WBC # BLD: 6.38 K/UL — SIGNIFICANT CHANGE UP (ref 4.8–10.8)
WBC # FLD AUTO: 6.38 K/UL — SIGNIFICANT CHANGE UP (ref 4.8–10.8)

## 2024-11-17 PROCEDURE — 99222 1ST HOSP IP/OBS MODERATE 55: CPT

## 2024-11-17 PROCEDURE — 99233 SBSQ HOSP IP/OBS HIGH 50: CPT

## 2024-11-17 RX ORDER — SODIUM CHLORIDE 9 MG/ML
500 INJECTION, SOLUTION INTRAMUSCULAR; INTRAVENOUS; SUBCUTANEOUS ONCE
Refills: 0 | Status: COMPLETED | OUTPATIENT
Start: 2024-11-17 | End: 2024-11-17

## 2024-11-17 RX ORDER — FAMOTIDINE 20 MG/1
20 TABLET, FILM COATED ORAL
Refills: 0 | Status: DISCONTINUED | OUTPATIENT
Start: 2024-11-17 | End: 2024-11-21

## 2024-11-17 RX ORDER — CLOPIDOGREL 75 MG/1
75 TABLET, FILM COATED ORAL DAILY
Refills: 0 | Status: DISCONTINUED | OUTPATIENT
Start: 2024-11-17 | End: 2024-11-21

## 2024-11-17 RX ADMIN — Medication 81 MILLIGRAM(S): at 12:00

## 2024-11-17 RX ADMIN — CLOPIDOGREL 75 MILLIGRAM(S): 75 TABLET, FILM COATED ORAL at 17:12

## 2024-11-17 RX ADMIN — FAMOTIDINE 20 MILLIGRAM(S): 20 TABLET, FILM COATED ORAL at 17:12

## 2024-11-17 RX ADMIN — ENOXAPARIN SODIUM 40 MILLIGRAM(S): 30 INJECTION SUBCUTANEOUS at 05:30

## 2024-11-17 RX ADMIN — SODIUM CHLORIDE 250 MILLILITER(S): 9 INJECTION, SOLUTION INTRAMUSCULAR; INTRAVENOUS; SUBCUTANEOUS at 20:02

## 2024-11-17 RX ADMIN — CHLORHEXIDINE GLUCONATE 1 APPLICATION(S): 1.2 RINSE ORAL at 05:36

## 2024-11-17 RX ADMIN — SODIUM CHLORIDE 3 GRAM(S): 9 INJECTION, SOLUTION INTRAMUSCULAR; INTRAVENOUS; SUBCUTANEOUS at 11:59

## 2024-11-17 RX ADMIN — SCOPOLAMINE 1 PATCH: 1 PATCH, EXTENDED RELEASE TRANSDERMAL at 08:00

## 2024-11-17 RX ADMIN — Medication 2 TABLET(S): at 21:16

## 2024-11-17 RX ADMIN — FAMOTIDINE 20 MILLIGRAM(S): 20 TABLET, FILM COATED ORAL at 12:00

## 2024-11-17 RX ADMIN — SODIUM CHLORIDE 3 GRAM(S): 9 INJECTION, SOLUTION INTRAMUSCULAR; INTRAVENOUS; SUBCUTANEOUS at 05:30

## 2024-11-17 RX ADMIN — SODIUM CHLORIDE 3 GRAM(S): 9 INJECTION, SOLUTION INTRAMUSCULAR; INTRAVENOUS; SUBCUTANEOUS at 23:42

## 2024-11-17 RX ADMIN — ACETAMINOPHEN 500MG 650 MILLIGRAM(S): 500 TABLET, COATED ORAL at 09:08

## 2024-11-17 RX ADMIN — Medication 40 MILLIGRAM(S): at 21:16

## 2024-11-17 RX ADMIN — ACETAMINOPHEN 500MG 650 MILLIGRAM(S): 500 TABLET, COATED ORAL at 09:50

## 2024-11-17 RX ADMIN — SCOPOLAMINE 1 PATCH: 1 PATCH, EXTENDED RELEASE TRANSDERMAL at 21:18

## 2024-11-17 RX ADMIN — SODIUM CHLORIDE 3 GRAM(S): 9 INJECTION, SOLUTION INTRAMUSCULAR; INTRAVENOUS; SUBCUTANEOUS at 17:11

## 2024-11-17 NOTE — PROGRESS NOTE ADULT - SUBJECTIVE AND OBJECTIVE BOX
Neurology Stroke Progress Note    INTERVAL HPI/OVERNIGHT EVENTS:  Patient seen and examined. Downgraded to floor from NICU this morning. No new complaints this morning. Pending ILR on monday and rehab.      MEDICATIONS  (STANDING):  aspirin  chewable 81 milliGRAM(s) Oral daily  atorvastatin 40 milliGRAM(s) Oral at bedtime  chlorhexidine 2% Cloths 1 Application(s) Topical <User Schedule>  enoxaparin Injectable 40 milliGRAM(s) SubCutaneous every 24 hours  famotidine    Tablet 20 milliGRAM(s) Oral daily  polyethylene glycol 3350 17 Gram(s) Oral every 12 hours  scopolamine 1 mG/72 Hr(s) Patch 1 Patch Transdermal every 72 hours  senna 2 Tablet(s) Oral at bedtime  sodium chloride 3 Gram(s) Oral every 6 hours    MEDICATIONS  (PRN):  acetaminophen     Tablet .. 650 milliGRAM(s) Oral every 6 hours PRN Temp greater or equal to 38C (100.4F), Mild Pain (1 - 3)  ondansetron Injectable 4 milliGRAM(s) IV Push three times a day PRN Nausea and/or Vomiting    Allergies    penicillin (Hives)    Intolerances      Vital Signs Last 24 Hrs  T(C): 36.5 (17 Nov 2024 13:19), Max: 37.9 (17 Nov 2024 00:00)  T(F): 97.7 (17 Nov 2024 13:19), Max: 100.2 (17 Nov 2024 00:00)  HR: 71 (17 Nov 2024 15:00) (65 - 83)  BP: 106/63 (17 Nov 2024 15:00) (97/55 - 140/103)  BP(mean): 75 (17 Nov 2024 13:19) (71 - 117)  RR: 18 (17 Nov 2024 13:19) (10 - 38)  SpO2: 96% (17 Nov 2024 13:19) (93% - 99%)    Parameters below as of 17 Nov 2024 13:19  Patient On (Oxygen Delivery Method): room air        Physical exam:  General: No acute distress, awake and alert  Eyes: Anicteric sclerae, moist conjunctivae, see below for CNs  Neck: trachea midline, FROM, supple, no thyromegaly or lymphadenopathy  Cardiovascular: Regular rate and rhythm, no murmurs, rubs, or gallops. No carotid bruits.   Pulmonary: Anterior breath sounds clear bilaterally, no crackles or wheezing. No use of accessory muscles  GI: Abdomen soft, non-distended, non-tender  Extremities: Radial and DP pulses +2, no edema    Neurological Examination:  General:  Appearance is consistent with chronologic age.   Cognitive/Language:  Awake, alert, and oriented to person, place, time.  Recent and remote memory intact.  Fund of knowledge is appropriate.  Naming, repetition and comprehension intact. Nondysarthric.    Cranial Nerves  - Eyes: Visual fields full.  EOMI w/o nystagmus, skew or reported double vision.  PERRL.  No ptosis/weakness of eyelid closure.    - Face:  Facial sensation normal V1 - 3, no facial asymmetry.   - Ears/Nose/Throat:  Hearing grossly intact b/l to finger rub. Tongue midline.  Motor exam: Normal tone and bulk. No tenderness, twitching, tremors or involuntary movements. b/l UE 5/5, b/l LE 5/5  Sensory examination:  Intact to light touch and pinprick, temperature and vibration in all extremities.  Reflexes: 2+ b/l biceps, triceps, patella and achilles  Cerebellum: FTN/HKS intact.  No dysmetria.    NIHSS 0    LABS:                        12.1   6.38  )-----------( 231      ( 17 Nov 2024 07:03 )             36.9     11-17    142  |  107  |  11  ----------------------------<  104[H]  3.5   |  28  |  0.6[L]    Ca    8.4      17 Nov 2024 07:03  Phos  3.2     11-16  Mg     2.5     11-17    TPro  5.7[L]  /  Alb  3.6  /  TBili  0.4  /  DBili  x   /  AST  68[H]  /  ALT  80[H]  /  AlkPhos  100  11-17      Urinalysis Basic - ( 17 Nov 2024 07:03 )    Color: x / Appearance: x / SG: x / pH: x  Gluc: 104 mg/dL / Ketone: x  / Bili: x / Urobili: x   Blood: x / Protein: x / Nitrite: x   Leuk Esterase: x / RBC: x / WBC x   Sq Epi: x / Non Sq Epi: x / Bacteria: x        RADIOLOGY & ADDITIONAL TESTS: Reviewed

## 2024-11-17 NOTE — CHART NOTE - NSCHARTNOTEFT_GEN_A_CORE
NSICU DOWN GRADE NOTE      Patient is a 64y old  Female who presents with a chief complaint of Stroke (16 Nov 2024 12:54)      64F. PMH: Hx of Cholecystitis s/p lap-roel, former smoker (13 pack years, quit 14 years ago)  Presented 11/9 c/o dizziness nausea and vomiting since waking up from sleep. LKW 11/8 11:30PM. Symptoms preceded by several days of cough, body aches, generalized weakness.   Patient at baseline is independent living on her own.     Stroke Unit Course:  Patient arrived at ED /69, breathing on room air, nontachycardic, EKG showing NSR. Stroke code activated: NIHSS 0. CTH (+) for Acute infarct involving the left cerebellar hemisphere, CTP showed 5cc of penumbra in L hemisphere, with 4cc of core infarct. 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 No TNK OOW, No MT - due to low NIHSS. Admitted to stroke unit for further workup. 11/10 in AM, patient's MRI completed showing large cerebellar infarct with partial effacement of 4th ventricle and mass effect on the medulla and left cerebellar tonsillar herniation. On evaluation, patient was awake, attentive to voice though in mild distress from severe nausea. NIHSS now a 1 for dysmetria on LUE. NSICU consulted, hypertonic saline started. Patient upgraded to ICU for further monitoring.     NSICU Course:   While in Neuro ICU patient was treated with 3% saline infusion to decrease cerebral swelling. TTE with bubble study revealed + PFO. DVT Study revealed popliteal DVT, patient currently not on full anticoagulation, just dvt ppx Lovenox 40 qd. Cardiology was consult for PFO closure. GREGORIO was preformed and did not show AA clot or PFO. EP was consulted for ILR, planned for Monday (NPO after midnight). 3% infusion stopped, patient is still on salt tabs 3g q8 with sodium goal 140-180. Neuro checks changed to q4, patient okay for downgrade to 3E with tele. NIH 0.       REVIEW OF SYSTEMS:  Denies all.       Physical Exam  Neurological: Awake, alert oriented to person, place and time, Following Commands, PERRL, EOMI, No Gaze Preference, Face Symmetrical, Speech Fluent, No dysmetria, No ataxia, No nystagmus     Motor exam:          Upper extremity                         Delt     Bicep     Tricep    HG                                                 R         RUE drift 5/5                                               L          5/5        5/5        5/5       5/5          Lower extremity                        HF         KF        KE       DF         PF                                                  R        5/5        5/5        5/5       5/5         5/5                                               L         5/5        5/5       5/5       5/5          5/5                                                 Sensation: [X ] intact to light touch        Pulmonary: Clear to Auscultation, No rales, No rhonchi, No wheezes     Cardiovascular: S1, S2, Regular rate and rhythm     Gastrointestinal: Soft, Non-tender, Non-distended     Extremities: No calf tenderness      MEDICATIONS:  acetaminophen     Tablet .. 650 milliGRAM(s) Oral every 6 hours PRN  aspirin  chewable 81 milliGRAM(s) Oral daily  atorvastatin 40 milliGRAM(s) Oral at bedtime  chlorhexidine 2% Cloths 1 Application(s) Topical <User Schedule>  enoxaparin Injectable 40 milliGRAM(s) SubCutaneous every 24 hours  famotidine    Tablet 20 milliGRAM(s) Oral daily  ondansetron Injectable 4 milliGRAM(s) IV Push three times a day PRN  polyethylene glycol 3350 17 Gram(s) Oral every 12 hours  scopolamine 1 mG/72 Hr(s) Patch 1 Patch Transdermal every 72 hours  senna 2 Tablet(s) Oral at bedtime  sodium chloride 3 Gram(s) Oral every 6 hours      T(C): 37.9 (11-17-24 @ 00:00), Max: 37.9 (11-17-24 @ 00:00)  HR: 68 (11-17-24 @ 02:00) (61 - 83)  BP: 98/53 (11-17-24 @ 02:00) (97/55 - 140/103)  RR: 16 (11-17-24 @ 02:00) (10 - 38)  SpO2: 93% (11-17-24 @ 02:00) (93% - 100%)  Wt(kg): --Vital Signs Last 24 Hrs  T(C): 37.9 (17 Nov 2024 00:00), Max: 37.9 (17 Nov 2024 00:00)  T(F): 100.2 (17 Nov 2024 00:00), Max: 100.2 (17 Nov 2024 00:00)  HR: 68 (17 Nov 2024 02:00) (61 - 83)  BP: 98/53 (17 Nov 2024 02:00) (97/55 - 140/103)  BP(mean): 74 (17 Nov 2024 02:00) (71 - 117)  RR: 16 (17 Nov 2024 02:00) (10 - 38)  SpO2: 93% (17 Nov 2024 02:00) (93% - 100%)    Parameters below as of 17 Nov 2024 00:00  Patient On (Oxygen Delivery Method): room air      Consultant(s) Notes Reviewed:  [x ] YES  [ ] NO  Care Discussed with Consultants/Other Providers [ x] YES  [ ] NO    LABS:                        12.1   5.99  )-----------( 218      ( 16 Nov 2024 04:30 )             36.6     11-16    142  |  105  |  8[L]  ----------------------------<  104[H]  3.9   |  30  |  0.6[L]    Ca    8.3[L]      16 Nov 2024 17:59  Phos  3.2     11-16  Mg     2.3     11-16    TPro  5.4[L]  /  Alb  3.5  /  TBili  0.4  /  DBili  x   /  AST  112[H]  /  ALT  93[H]  /  AlkPhos  90  11-16      Urinalysis Basic - ( 16 Nov 2024 17:59 )    Color: x / Appearance: x / SG: x / pH: x  Gluc: 104 mg/dL / Ketone: x  / Bili: x / Urobili: x   Blood: x / Protein: x / Nitrite: x   Leuk Esterase: x / RBC: x / WBC x   Sq Epi: x / Non Sq Epi: x / Bacteria: x      CAPILLARY BLOOD GLUCOSE      Urinalysis Basic - ( 16 Nov 2024 17:59 )    Color: x / Appearance: x / SG: x / pH: x  Gluc: 104 mg/dL / Ketone: x  / Bili: x / Urobili: x   Blood: x / Protein: x / Nitrite: x   Leuk Esterase: x / RBC: x / WBC x   Sq Epi: x / Non Sq Epi: x / Bacteria: x      RADIOLOGY & ADDITIONAL TESTS:    Imaging Personally Reviewed:  [x ] YES  [ ] NO    Plan:  Etiology of  Cerebellar stroke - Non ICAD likely embolic   - Gen neuro checks q 4  - Stroke Code CTH/CTP/CTA: Completed and reviewed  - ASA 81mg q day/ Plavix off for now ( last Dose 11/10/24 )   -  PRU - 205- 11/11/24  - MRI/MRA neck  - No vert artery dissection - Fat sat    - CT head stable   - Fall and Aspiration precautions  - Physiatry eval/Physical therapy/Occupational therapy/Speech and Swallow  - F/u HbA1c- 5.9 , TSH and Lipid panel- LDL- 66/ TSH - 1.77    - Scopolamine  patch  1mg q three days   -  D/C hypertonic saline 3%  30cc/hr for cerebral edema  / goal Na 140-150;   - NaCl tabs 3 grams q 8   - C/w Atorvastatin 40mg daily- monitor LFT   - C/w DVT PPX as below    CARDIOLOGY-  / HLD / Hypertension  - Goal - <150  - Loop recorder evaluation   - No PFO on GREGORIO or intramural thrombus   - hold home blood pressure medication for now  -  EKG Results: NSR   - LDL results: 66  - Decrease Statin to 40mg  q day if LFT > 4x nl - D/C statin       PULMONOLOGY- Ex - smoker   - call provider if SPO2 < 95%    GI-  Mildly tranaminitis/ Dyspepsia  - Pepcid 20mg q day   - If LFT > 4 X nl - D/C statin   - No evidence of scopolamine causing increased LFT   - Diet: easy to chew - DASH diet   - Zofran prn - N/V   - Senna / Miralax / MOM 30cc q 8 prn   - last BM -  11/16/24   - No GI prophylaxis needed    RENAL/ELECTROLYTES  - off 3 % and check SMA-7 at 4 pm   - D/C horn   -Na Cl tabs  3 grams q 6   - Na goal 140- 150 for cerebral edema   - Replete K<4 and Mg <2    INFECTIOUS DISEASE  - Monitor  for fever     ENDOCRINE  Stroke Core measures as above     HEME/ONC - R peroneal DVT  Lovenox 40mg q day  ASA 81 mg qday   F/U DVT in 4 Days - 11/16- R peroneal DVT with NO propagation   L SCD NSICU DOWN GRADE NOTE      Patient is a 64y old  Female who presents with a chief complaint of Stroke (16 Nov 2024 12:54)      64F. PMH: Hx of Cholecystitis s/p lap-roel, former smoker (13 pack years, quit 14 years ago)  Presented 11/9 c/o dizziness nausea and vomiting since waking up from sleep. LKW 11/8 11:30PM. Symptoms preceded by several days of cough, body aches, generalized weakness.   Patient at baseline is independent living on her own.     Stroke Unit Course:  Patient arrived at ED /69, breathing on room air, nontachycardic, EKG showing NSR. Stroke code activated: NIHSS 0. CTH (+) for Acute infarct involving the left cerebellar hemisphere, CTP showed 5cc of penumbra in L hemisphere, with 4cc of core infarct. 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 No TNK OOW, No MT - due to low NIHSS. Admitted to stroke unit for further workup. 11/10 in AM, patient's MRI completed showing large cerebellar infarct with partial effacement of 4th ventricle and mass effect on the medulla and left cerebellar tonsillar herniation. On evaluation, patient was awake, attentive to voice though in mild distress from severe nausea. NIHSS now a 1 for dysmetria on LUE. NSICU consulted, hypertonic saline started. Patient upgraded to ICU for further monitoring.     NSICU Course:   While in Neuro ICU patient was treated with 3% saline infusion to decrease cerebral swelling. TTE with bubble study revealed + PFO. DVT Study revealed popliteal DVT, patient currently not on full anticoagulation, just dvt ppx Lovenox 40 qd. Cardiology was consult for PFO closure. GREGORIO was preformed and did not show AA clot or PFO. EP was consulted for ILR, planned for Monday (NPO after midnight). 3% infusion stopped, patient is still on salt tabs 3g q8 with sodium goal 140-180. Neuro checks changed to q4, patient okay for downgrade to 3E with tele. NIH 0. Patient had mild transaminitis, so statin was held.       REVIEW OF SYSTEMS:  Denies all.       Physical Exam  Neurological: Awake, alert oriented to person, place and time, Following Commands, PERRL, EOMI, No Gaze Preference, Face Symmetrical, Speech Fluent, No dysmetria, No ataxia, No nystagmus     Motor exam:          Upper extremity                         Delt     Bicep     Tricep    HG                                                 R         RUE drift 5/5                                               L          5/5        5/5        5/5       5/5          Lower extremity                        HF         KF        KE       DF         PF                                                  R        5/5        5/5        5/5       5/5         5/5                                               L         5/5        5/5       5/5       5/5          5/5                                                 Sensation: [X ] intact to light touch        Pulmonary: Clear to Auscultation, No rales, No rhonchi, No wheezes     Cardiovascular: S1, S2, Regular rate and rhythm     Gastrointestinal: Soft, Non-tender, Non-distended     Extremities: No calf tenderness      MEDICATIONS:  acetaminophen     Tablet .. 650 milliGRAM(s) Oral every 6 hours PRN  aspirin  chewable 81 milliGRAM(s) Oral daily  atorvastatin 40 milliGRAM(s) Oral at bedtime  chlorhexidine 2% Cloths 1 Application(s) Topical <User Schedule>  enoxaparin Injectable 40 milliGRAM(s) SubCutaneous every 24 hours  famotidine    Tablet 20 milliGRAM(s) Oral daily  ondansetron Injectable 4 milliGRAM(s) IV Push three times a day PRN  polyethylene glycol 3350 17 Gram(s) Oral every 12 hours  scopolamine 1 mG/72 Hr(s) Patch 1 Patch Transdermal every 72 hours  senna 2 Tablet(s) Oral at bedtime  sodium chloride 3 Gram(s) Oral every 6 hours      T(C): 37.9 (11-17-24 @ 00:00), Max: 37.9 (11-17-24 @ 00:00)  HR: 68 (11-17-24 @ 02:00) (61 - 83)  BP: 98/53 (11-17-24 @ 02:00) (97/55 - 140/103)  RR: 16 (11-17-24 @ 02:00) (10 - 38)  SpO2: 93% (11-17-24 @ 02:00) (93% - 100%)  Wt(kg): --Vital Signs Last 24 Hrs  T(C): 37.9 (17 Nov 2024 00:00), Max: 37.9 (17 Nov 2024 00:00)  T(F): 100.2 (17 Nov 2024 00:00), Max: 100.2 (17 Nov 2024 00:00)  HR: 68 (17 Nov 2024 02:00) (61 - 83)  BP: 98/53 (17 Nov 2024 02:00) (97/55 - 140/103)  BP(mean): 74 (17 Nov 2024 02:00) (71 - 117)  RR: 16 (17 Nov 2024 02:00) (10 - 38)  SpO2: 93% (17 Nov 2024 02:00) (93% - 100%)    Parameters below as of 17 Nov 2024 00:00  Patient On (Oxygen Delivery Method): room air      Consultant(s) Notes Reviewed:  [x ] YES  [ ] NO  Care Discussed with Consultants/Other Providers [ x] YES  [ ] NO    LABS:                        12.1   5.99  )-----------( 218      ( 16 Nov 2024 04:30 )             36.6     11-16    142  |  105  |  8[L]  ----------------------------<  104[H]  3.9   |  30  |  0.6[L]    Ca    8.3[L]      16 Nov 2024 17:59  Phos  3.2     11-16  Mg     2.3     11-16    TPro  5.4[L]  /  Alb  3.5  /  TBili  0.4  /  DBili  x   /  AST  112[H]  /  ALT  93[H]  /  AlkPhos  90  11-16      Urinalysis Basic - ( 16 Nov 2024 17:59 )    Color: x / Appearance: x / SG: x / pH: x  Gluc: 104 mg/dL / Ketone: x  / Bili: x / Urobili: x   Blood: x / Protein: x / Nitrite: x   Leuk Esterase: x / RBC: x / WBC x   Sq Epi: x / Non Sq Epi: x / Bacteria: x      CAPILLARY BLOOD GLUCOSE      Urinalysis Basic - ( 16 Nov 2024 17:59 )    Color: x / Appearance: x / SG: x / pH: x  Gluc: 104 mg/dL / Ketone: x  / Bili: x / Urobili: x   Blood: x / Protein: x / Nitrite: x   Leuk Esterase: x / RBC: x / WBC x   Sq Epi: x / Non Sq Epi: x / Bacteria: x      RADIOLOGY & ADDITIONAL TESTS:    Imaging Personally Reviewed:  [x ] YES  [ ] NO    Plan:  Etiology of  Cerebellar stroke - Non ICAD likely embolic   - Gen neuro checks q 4  - Stroke Code CTH/CTP/CTA: Completed and reviewed  - ASA 81mg q day/ Plavix off for now ( last Dose 11/10/24 )   -  PRU - 205- 11/11/24  - MRI/MRA neck  - No vert artery dissection - Fat sat    - CT head stable   - Fall and Aspiration precautions  - Physiatry eval/Physical therapy/Occupational therapy/Speech and Swallow  - F/u HbA1c- 5.9 , TSH and Lipid panel- LDL- 66/ TSH - 1.77    - Scopolamine  patch  1mg q three days   -  D/C hypertonic saline 3%  30cc/hr for cerebral edema  / goal Na 140-150;   - NaCl tabs 3 grams q 8   - C/w Atorvastatin 40mg daily- monitor LFT   - C/w DVT PPX as below    CARDIOLOGY-  / HLD / Hypertension  - Goal - <150  - Loop recorder evaluation   - No PFO on GREGORIO or intramural thrombus   - hold home blood pressure medication for now  -  EKG Results: NSR   - LDL results: 66  - Decrease Statin to 40mg  q day if LFT > 4x nl - D/C statin       PULMONOLOGY- Ex - smoker   - call provider if SPO2 < 95%    GI-  Mildly tranaminitis/ Dyspepsia  - Pepcid 20mg q day   - If LFT > 4 X nl - D/C statin   - No evidence of scopolamine causing increased LFT   - Diet: easy to chew - DASH diet   - Zofran prn - N/V   - Senna / Miralax / MOM 30cc q 8 prn   - last BM -  11/16/24   - No GI prophylaxis needed    RENAL/ELECTROLYTES  - off 3 % and check SMA-7 at 4 pm   - D/C horn   -Na Cl tabs  3 grams q 6   - Na goal 140- 150 for cerebral edema   - Replete K<4 and Mg <2    INFECTIOUS DISEASE  - Monitor  for fever     ENDOCRINE  Stroke Core measures as above     HEME/ONC - R peroneal DVT  Lovenox 40mg q day  ASA 81 mg qday   F/U DVT in 4 Days - 11/16- R peroneal DVT with NO propagation   L SCD

## 2024-11-17 NOTE — CHART NOTE - NSCHARTNOTEFT_GEN_A_CORE
Transferred from NSICU  Transferred to 3E floor Neurology        64F. PMH: Hx of Cholecystitis s/p lap-roel, former smoker (13 pack years, quit 14 years ago) Presented 11/9 c/o dizziness nausea and vomiting since waking up from sleep. LKW 11/8 11:30PM. Symptoms preceded by several days of cough, body aches, generalized weakness. Patient at baseline is independent living on her own.     Stroke Unit Course:  Patient arrived at ED /69, breathing on room air, nontachycardic, EKG showing NSR. Stroke code activated: NIHSS 0. CTH (+) for Acute infarct involving the left cerebellar hemisphere, CTP showed 5cc of penumbra in L hemisphere, with 4cc of core infarct. 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 No TNK OOW, No MT - due to low NIHSS. Admitted to stroke unit for further workup. 11/10 in AM, patient's MRI completed showing large cerebellar infarct with partial effacement of 4th ventricle and mass effect on the medulla and left cerebellar tonsillar herniation. On evaluation, patient was awake, attentive to voice though in mild distress from severe nausea. NIHSS now a 1 for dysmetria on LUE. NSICU consulted, hypertonic saline started. Patient upgraded to ICU for further monitoring.     NSICU Course:   While in Neuro ICU patient was treated with 3% saline infusion to decrease cerebral swelling. TTE with bubble study revealed + PFO. DVT Study revealed popliteal DVT, patient currently not on full anticoagulation, just dvt ppx Lovenox 40 qd. Cardiology was consult for PFO closure. GREGORIO was preformed and did not show AA clot or PFO. EP was consulted for ILR, planned for Monday. 3% infusion stopped, patient is still on salt tabs 3g q8 with sodium goal 140-180. Neuro checks changed to q4, patient okay for downgrade to 3E with tele. NIH 0. Patient had mild transaminitis, so statin was decreased to 40mg QHS.                Plan  - Plan for ILR placement on Mon 11/18  - c/w Aspirin 81mg QD  - Consider restarting Plavix  - c/w Atorvastatin 40mg QHS  - c/w Salt tab 3g Q6h, Goal Na 140 - 150  - c/w DVT ppx Lovenox  - PT/OT/Physiatry Transferred from NSICU  Transferred to 3E floor Neurology      64F. PMH: Hx of Cholecystitis s/p lap-roel, former smoker (13 pack years, quit 14 years ago) Presented 11/9 c/o dizziness nausea and vomiting since waking up from sleep. LKW 11/8 11:30PM. Symptoms preceded by several days of cough, body aches, generalized weakness. Patient at baseline is independent living on her own.     Stroke Unit Course:  Patient arrived at ED /69, breathing on room air, nontachycardic, EKG showing NSR. Stroke code activated: NIHSS 0. CTH (+) for Acute infarct involving the left cerebellar hemisphere, CTP showed 5cc of penumbra in L hemisphere, with 4cc of core infarct. 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 No TNK OOW, No MT - due to low NIHSS. Admitted to stroke unit for further workup. 11/10 in AM, patient's MRI completed showing large cerebellar infarct with partial effacement of 4th ventricle and mass effect on the medulla and left cerebellar tonsillar herniation. On evaluation, patient was awake, attentive to voice though in mild distress from severe nausea. NIHSS now a 1 for dysmetria on LUE. NSICU consulted, hypertonic saline started. Patient upgraded to ICU for further monitoring.     NSICU Course:   While in Neuro ICU patient was treated with 3% saline infusion to decrease cerebral swelling. TTE with bubble study revealed + PFO. DVT Study revealed popliteal DVT, patient currently not on full anticoagulation, just dvt ppx Lovenox 40 qd. Cardiology was consult for PFO closure. GREGORIO was preformed and did not show AA clot or PFO. EP was consulted for ILR, planned for Monday. 3% infusion stopped, patient is still on salt tabs 3g q8 with sodium goal 140-180. Neuro checks changed to q4, patient okay for downgrade to 3E with tele. NIH 0. Patient had mild transaminitis, so statin was decreased to 40mg QHS.      Neurological Examination:  General:  Appearance is consistent with chronologic age.   Cognitive/Language:  Awake, alert, and oriented to person, place, time.  Recent and remote memory intact.  Fund of knowledge is appropriate.  Naming, repetition and comprehension intact. Nondysarthric.    Cranial Nerves  - Eyes: Visual fields full.  EOMI w/o nystagmus, skew or reported double vision.  PERRL.  No ptosis/weakness of eyelid closure.    - Face:  Facial sensation normal V1 - 3, no facial asymmetry.   - Ears/Nose/Throat:  Hearing grossly intact b/l to finger rub. Tongue and uvula midline.  Motor exam: Normal tone and bulk. No tenderness, twitching, tremors or involuntary movements. b/l UE 5/5, b/l LE 5/5  Sensory examination:  Intact to light touch and pinprick, temperature and vibration in all extremities.  Reflexes: 2+ b/l biceps, triceps, patella and achilles.  Plantar response downgoing b/l.  Cerebellum: FTN/HKS intact.  No dysmetria.  NIHSS 0      Plan  - Plan for ILR placement on Mon 11/18  - c/w Aspirin 81mg QD  - Consider restarting Plavix  - c/w Atorvastatin 40mg QHS  - c/w Salt tab 3g Q6h, Goal Na 140 - 150  - c/w DVT ppx Lovenox  - PT/OT/Physiatry

## 2024-11-17 NOTE — PROGRESS NOTE ADULT - ASSESSMENT
64F. PMH: Hx of Cholecystitis s/p lap-roel, former smoker (13 pack years, quit 14 years ago). Presented 11/9 c/o dizziness nausea and vomiting since waking up from sleep. LKW 11/8 11:30PM Stroke code activated: NIHSS 0. CTH (+) for Acute infarct involving the left cerebellar hemisphere, CTP showed 5cc of penumbra in L hemisphere, with 4cc of core infarct. 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 No TNK OOW, No MT - due to low NIHSS. Admitted to stroke unit for further workup. 11/10 in AM, patient's MRI completed showing large cerebellar infarct with partial effacement of 4th ventricle and mass effect on the medulla and left cerebellar tonsillar herniation. On evaluation, patient was awake, attentive to voice though in mild distress from severe nausea. NIHSS now a 1 for dysmetria on LUE. NSICU consulted, hypertonic saline started. Patient upgraded to ICU for further monitoring. Etiology likely SHEFALI vs ESUS. Patient will be admitted to stroke unit for monitoring and further workup.      NEUROLOGY- - Etiology of  Cerebellar stroke - Non ICAD likely embolic   - Gen neuro checks q8  - Stroke Code CTH/CTP/CTA: Completed and reviewed  - ASA 81mg q day/ Plavix off for now ( last Dose 11/10/24 )   -  PRU - 205- 11/11/24  - MRI/MRA neck  - No vert artery dissection - Fat sat    - some petechial hem seen on L cerebellum on MRA but repeat CTH was neg, can consider starting plavix from tomorrow  - Fall and Aspiration precautions  - Physiatry eval/Physical therapy/Occupational therapy/Speech and Swallow  - F/u HbA1c- 5.9 , TSH and Lipid panel- LDL- 66/ TSH - 1.77     - Scopolamine  patch  1mg q three days   -  D/C hypertonic saline 3%  30cc/hr for cerebral edema  / goal Na 140-150;   - NaCl tabs 3 grams q 8   - C/w Atorvastatin 40mg daily- monitor LFT   - C/w DVT PPX as below    CARDIOLOGY-  / HLD / Hypertension  - Goal - <150  - EP consult, ILR on 11/18 Monday  - No PFO on GREGORIO or intramural thrombus   - hold home blood pressure medication for now  -  EKG Results: NSR   - LDL results: 66  - Decrease Statin to 40mg  q day if LFT > 4x nl - D/C statin       PULMONOLOGY- Ex - smoker   - call provider if SPO2 < 95%    GI-  Mildly tranaminitis/ Dyspepsia  - Pepcid 20mg BID  - If LFT > 4 X nl - D/C statin   - No evidence of scopolamine causing increased LFT   - Diet: easy to chew - DASH diet   - Zofran prn - N/V   - Senna / Miralax / MOM 30cc q 8 prn   - last BM -  11/16/24   - No GI prophylaxis needed    RENAL/ELECTROLYTES  - off 3 % and check SMA-7 at 4 pm   - D/C horn   -Na Cl tabs  3 grams q 6 (wean off)  - Na goal 140- 150 for cerebral edema   - Replete K<4 and Mg <2    INFECTIOUS DISEASE  - Monitor  for fever     ENDOCRINE  Stroke Core measures as above     HEME/ONC - R peroneal DVT  Lovenox 40mg q day  ASA 81 mg qday   F/U DVT in 4 Days - 11/16- R peroneal DVT with NO propagation   L SCD      Dispo  - 3 E with Tele  64F. PMH: Hx of Cholecystitis s/p lap-roel, former smoker (13 pack years, quit 14 years ago). Presented 11/9 c/o dizziness nausea and vomiting since waking up from sleep. LKW 11/8 11:30PM Stroke code activated: NIHSS 0. CTH (+) for Acute infarct involving the left cerebellar hemisphere, CTP showed 5cc of penumbra in L hemisphere, with 4cc of core infarct. 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 No TNK OOW, No MT - due to low NIHSS. Admitted to stroke unit for further workup. 11/10 in AM, patient's MRI completed showing large cerebellar infarct with partial effacement of 4th ventricle and mass effect on the medulla and left cerebellar tonsillar herniation. On evaluation, patient was awake, attentive to voice though in mild distress from severe nausea. NIHSS now a 1 for dysmetria on LUE. NSICU consulted, hypertonic saline started. Patient upgraded to ICU for further monitoring. Etiology likely SHEFALI vs ESUS. Patient will be admitted to stroke unit for monitoring and further workup.      NEUROLOGY- - Etiology of  Cerebellar stroke - Non ICAD likely embolic   - Gen neuro checks q8  - Stroke Code CTH/CTP/CTA: Completed and reviewed  - ASA 81mg q day/ Plavix off for now ( last Dose 11/10/24 )   - PRU - 205- 11/11/24  - MRI/MRA neck  - No vert artery dissection - Fat sat    - CTH was neg for hemorrhage transformation on 11/15, Start Plavix 75mg daily today  - Fall and Aspiration precautions  - Physiatry eval/Physical therapy/Occupational therapy/Speech and Swallow  - F/u HbA1c- 5.9 , TSH and Lipid panel- LDL- 66/ TSH - 1.77     - Scopolamine  patch  1mg q three days   -  D/C hypertonic saline 3%  30cc/hr for cerebral edema  / goal Na 140-150;   - NaCl tabs 3 grams q 8   - C/w Atorvastatin 40mg daily- monitor LFT   - C/w DVT PPX as below    CARDIOLOGY-  / HLD / Hypertension  - Goal - <150  - EP consult, ILR on 11/18 Monday  - No PFO on GREGORIO or intramural thrombus   - hold home blood pressure medication for now  -  EKG Results: NSR   - LDL results: 66  - Decrease Statin to 40mg  q day if LFT > 4x nl - D/C statin       PULMONOLOGY- Ex - smoker   - call provider if SPO2 < 95%    GI-  Mildly tranaminitis/ Dyspepsia  - Pepcid 20mg BID  - If LFT > 4 X nl - D/C statin   - No evidence of scopolamine causing increased LFT   - Diet: easy to chew - DASH diet   - Zofran prn - N/V   - Senna / Miralax / MOM 30cc q 8 prn   - last BM -  11/16/24   - No GI prophylaxis needed    RENAL/ELECTROLYTES  - off 3 % and check SMA-7 at 4 pm   - D/C horn   -Na Cl tabs  3 grams q 6 (wean off)  - Na goal 140- 150 for cerebral edema   - Replete K<4 and Mg <2    INFECTIOUS DISEASE  - Monitor  for fever     ENDOCRINE  Stroke Core measures as above     HEME/ONC - R peroneal DVT  Lovenox 40mg q day  ASA 81 mg qday   F/U DVT in 4 Days - 11/16- R peroneal DVT with NO propagation   L SCD      Dispo  - 3 E with Tele  64F. PMH: Hx of Cholecystitis s/p lap-roel, former smoker (13 pack years, quit 14 years ago). Presented 11/9 c/o dizziness nausea and vomiting since waking up from sleep. LKW 11/8 11:30PM Stroke code activated: NIHSS 0. CTH (+) for Acute infarct involving the left cerebellar hemisphere, CTP showed 5cc of penumbra in L hemisphere, with 4cc of core infarct. 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 No TNK OOW, No MT - due to low NIHSS. Admitted to stroke unit for further workup. 11/10 in AM, patient's MRI completed showing large cerebellar infarct with partial effacement of 4th ventricle and mass effect on the medulla and left cerebellar tonsillar herniation. On evaluation, patient was awake, attentive to voice though in mild distress from severe nausea. NIHSS now a 1 for dysmetria on LUE. NSICU consulted, hypertonic saline started. Patient upgraded to ICU for further monitoring. Etiology likely SHEFALI vs ESUS. Patient will be admitted to stroke unit for monitoring and further workup.      NEUROLOGY- - Etiology of  Cerebellar stroke - Non ICAD likely embolic   - Gen neuro checks q8  - Stroke Code CTH/CTP/CTA: Completed and reviewed  - ASA 81mg q day/ Plavix off for now ( last Dose 11/10/24 )   - PRU - 205- 11/11/24  - MRI/MRA neck  - No vert artery dissection - Fat sat    - CTH was neg for hemorrhage transformation on 11/15, Start Plavix 75mg daily today  - Fall and Aspiration precautions  - Physiatry eval/Physical therapy/Occupational therapy/Speech and Swallow  - F/u HbA1c- 5.9 , TSH and Lipid panel- LDL- 66/ TSH - 1.77     - Scopolamine  patch  1mg q three days   - NaCl tabs 3 grams q 8   - C/w Atorvastatin 40mg daily- monitor LFT   - C/w DVT PPX as below    CARDIOLOGY-  / HLD / Hypertension  - Goal - <150  - EP consult, ILR on 11/18 Monday  - No PFO on GREGORIO or intramural thrombus   - hold home blood pressure medication for now  -  EKG Results: NSR   - LDL results: 66  - Decrease Statin to 40mg  q day if LFT > 4x nl - D/C statin       PULMONOLOGY- Ex - smoker   - call provider if SPO2 < 95%    GI-  Mildly tranaminitis/ Dyspepsia  - Pepcid 20mg BID  - If LFT > 4 X nl - D/C statin   - No evidence of scopolamine causing increased LFT   - Diet: easy to chew - DASH diet   - Zofran prn - N/V   - Senna / Miralax / MOM 30cc q 8 prn   - last BM -  11/16/24   - No GI prophylaxis needed    RENAL/ELECTROLYTES  - off 3 % and check SMA-7 at 4 pm   - D/C horn   -Na Cl tabs  3 grams q 6 (wean off)  - Na goal 140- 150 for cerebral edema   - Replete K<4 and Mg <2    INFECTIOUS DISEASE  - Monitor  for fever     ENDOCRINE  Stroke Core measures as above     HEME/ONC - R peroneal DVT  Lovenox 40mg q day  ASA 81 mg qday   F/U DVT in 4 Days - 11/16- R peroneal DVT with NO propagation   L SCD      Dispo  - 3 E with Tele

## 2024-11-18 LAB
ALBUMIN SERPL ELPH-MCNC: 3.5 G/DL — SIGNIFICANT CHANGE UP (ref 3.5–5.2)
ALP SERPL-CCNC: 91 U/L — SIGNIFICANT CHANGE UP (ref 30–115)
ALT FLD-CCNC: 68 U/L — HIGH (ref 0–41)
ANION GAP SERPL CALC-SCNC: 11 MMOL/L — SIGNIFICANT CHANGE UP (ref 7–14)
AST SERPL-CCNC: 49 U/L — HIGH (ref 0–41)
BASOPHILS # BLD AUTO: 0.04 K/UL — SIGNIFICANT CHANGE UP (ref 0–0.2)
BASOPHILS NFR BLD AUTO: 0.6 % — SIGNIFICANT CHANGE UP (ref 0–1)
BILIRUB SERPL-MCNC: 0.7 MG/DL — SIGNIFICANT CHANGE UP (ref 0.2–1.2)
BUN SERPL-MCNC: 9 MG/DL — LOW (ref 10–20)
CALCIUM SERPL-MCNC: 8.5 MG/DL — SIGNIFICANT CHANGE UP (ref 8.4–10.5)
CHLORIDE SERPL-SCNC: 106 MMOL/L — SIGNIFICANT CHANGE UP (ref 98–110)
CO2 SERPL-SCNC: 27 MMOL/L — SIGNIFICANT CHANGE UP (ref 17–32)
CREAT SERPL-MCNC: 0.6 MG/DL — LOW (ref 0.7–1.5)
EGFR: 100 ML/MIN/1.73M2 — SIGNIFICANT CHANGE UP
EOSINOPHIL # BLD AUTO: 0.17 K/UL — SIGNIFICANT CHANGE UP (ref 0–0.7)
EOSINOPHIL NFR BLD AUTO: 2.8 % — SIGNIFICANT CHANGE UP (ref 0–8)
GLUCOSE SERPL-MCNC: 104 MG/DL — HIGH (ref 70–99)
HCT VFR BLD CALC: 35.7 % — LOW (ref 37–47)
HGB BLD-MCNC: 11.7 G/DL — LOW (ref 12–16)
IMM GRANULOCYTES NFR BLD AUTO: 0.2 % — SIGNIFICANT CHANGE UP (ref 0.1–0.3)
LYMPHOCYTES # BLD AUTO: 1.54 K/UL — SIGNIFICANT CHANGE UP (ref 1.2–3.4)
LYMPHOCYTES # BLD AUTO: 25 % — SIGNIFICANT CHANGE UP (ref 20.5–51.1)
MCHC RBC-ENTMCNC: 27.3 PG — SIGNIFICANT CHANGE UP (ref 27–31)
MCHC RBC-ENTMCNC: 32.8 G/DL — SIGNIFICANT CHANGE UP (ref 32–37)
MCV RBC AUTO: 83.2 FL — SIGNIFICANT CHANGE UP (ref 81–99)
MONOCYTES # BLD AUTO: 0.48 K/UL — SIGNIFICANT CHANGE UP (ref 0.1–0.6)
MONOCYTES NFR BLD AUTO: 7.8 % — SIGNIFICANT CHANGE UP (ref 1.7–9.3)
NEUTROPHILS # BLD AUTO: 3.93 K/UL — SIGNIFICANT CHANGE UP (ref 1.4–6.5)
NEUTROPHILS NFR BLD AUTO: 63.6 % — SIGNIFICANT CHANGE UP (ref 42.2–75.2)
NRBC # BLD: 0 /100 WBCS — SIGNIFICANT CHANGE UP (ref 0–0)
PLATELET # BLD AUTO: 224 K/UL — SIGNIFICANT CHANGE UP (ref 130–400)
PMV BLD: 11 FL — HIGH (ref 7.4–10.4)
POTASSIUM SERPL-MCNC: 4.1 MMOL/L — SIGNIFICANT CHANGE UP (ref 3.5–5)
POTASSIUM SERPL-SCNC: 4.1 MMOL/L — SIGNIFICANT CHANGE UP (ref 3.5–5)
PROT SERPL-MCNC: 6.1 G/DL — SIGNIFICANT CHANGE UP (ref 6–8)
RBC # BLD: 4.29 M/UL — SIGNIFICANT CHANGE UP (ref 4.2–5.4)
RBC # FLD: 12.9 % — SIGNIFICANT CHANGE UP (ref 11.5–14.5)
SODIUM SERPL-SCNC: 144 MMOL/L — SIGNIFICANT CHANGE UP (ref 135–146)
WBC # BLD: 6.17 K/UL — SIGNIFICANT CHANGE UP (ref 4.8–10.8)
WBC # FLD AUTO: 6.17 K/UL — SIGNIFICANT CHANGE UP (ref 4.8–10.8)

## 2024-11-18 RX ORDER — SODIUM CHLORIDE 9 MG/ML
1 INJECTION, SOLUTION INTRAMUSCULAR; INTRAVENOUS; SUBCUTANEOUS
Refills: 0 | Status: DISCONTINUED | OUTPATIENT
Start: 2024-11-19 | End: 2024-11-19

## 2024-11-18 RX ADMIN — CLOPIDOGREL 75 MILLIGRAM(S): 75 TABLET, FILM COATED ORAL at 12:59

## 2024-11-18 RX ADMIN — SCOPOLAMINE 1 PATCH: 1 PATCH, EXTENDED RELEASE TRANSDERMAL at 12:00

## 2024-11-18 RX ADMIN — POLYETHYLENE GLYCOL 3350 17 GRAM(S): 17 POWDER, FOR SOLUTION ORAL at 17:32

## 2024-11-18 RX ADMIN — Medication 40 MILLIGRAM(S): at 21:21

## 2024-11-18 RX ADMIN — ENOXAPARIN SODIUM 40 MILLIGRAM(S): 30 INJECTION SUBCUTANEOUS at 05:21

## 2024-11-18 RX ADMIN — SCOPOLAMINE 1 PATCH: 1 PATCH, EXTENDED RELEASE TRANSDERMAL at 19:13

## 2024-11-18 RX ADMIN — CHLORHEXIDINE GLUCONATE 1 APPLICATION(S): 1.2 RINSE ORAL at 05:20

## 2024-11-18 RX ADMIN — Medication 2 TABLET(S): at 21:21

## 2024-11-18 RX ADMIN — SODIUM CHLORIDE 3 GRAM(S): 9 INJECTION, SOLUTION INTRAMUSCULAR; INTRAVENOUS; SUBCUTANEOUS at 12:59

## 2024-11-18 RX ADMIN — FAMOTIDINE 20 MILLIGRAM(S): 20 TABLET, FILM COATED ORAL at 05:21

## 2024-11-18 RX ADMIN — SODIUM CHLORIDE 3 GRAM(S): 9 INJECTION, SOLUTION INTRAMUSCULAR; INTRAVENOUS; SUBCUTANEOUS at 05:21

## 2024-11-18 RX ADMIN — Medication 81 MILLIGRAM(S): at 12:58

## 2024-11-18 RX ADMIN — SCOPOLAMINE 1 PATCH: 1 PATCH, EXTENDED RELEASE TRANSDERMAL at 12:59

## 2024-11-18 RX ADMIN — POLYETHYLENE GLYCOL 3350 17 GRAM(S): 17 POWDER, FOR SOLUTION ORAL at 05:21

## 2024-11-18 RX ADMIN — FAMOTIDINE 20 MILLIGRAM(S): 20 TABLET, FILM COATED ORAL at 17:32

## 2024-11-18 NOTE — PROGRESS NOTE ADULT - ASSESSMENT
64F. PMH: Hx of Cholecystitis s/p lap-roel, former smoker (13 pack years, quit 14 years ago). Presented 11/9 c/o dizziness nausea and vomiting since waking up from sleep. LKW 11/8 11:30PM Stroke code activated: NIHSS 0. CTH (+) for Acute infarct involving the left cerebellar hemisphere, CTP showed 5cc of penumbra in L hemisphere, with 4cc of core infarct. 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 No TNK OOW, No MT - due to low NIHSS. Admitted to stroke unit for further workup. 11/10 in AM, patient's MRI completed showing large cerebellar infarct with partial effacement of 4th ventricle and mass effect on the medulla and left cerebellar tonsillar herniation. On evaluation, patient was awake, attentive to voice though in mild distress from severe nausea. NIHSS now a 1 for dysmetria on LUE. NSICU consulted, hypertonic saline started. Patient upgraded to ICU for further monitoring. Etiology likely SHEFALI vs ESUS. Patient will be admitted to stroke unit for monitoring and further workup.      NEUROLOGY- - Etiology of  Cerebellar stroke - Non ICAD likely embolic   - Gen neuro checks q8  - Stroke Code CTH/CTP/CTA: Completed and reviewed  - ASA 81mg q day/ Plavix off for now ( last Dose 11/10/24 )   - PRU - 205- 11/11/24  - MRI/MRA neck  - No vert artery dissection - Fat sat    - CTH was neg for hemorrhage transformation on 11/15, Start Plavix 75mg daily today  - Fall and Aspiration precautions  - Physiatry eval/Physical therapy/Occupational therapy/Speech and Swallow  - F/u HbA1c- 5.9 , TSH and Lipid panel- LDL- 66/ TSH - 1.77     - Scopolamine  patch  1mg q three days   - NaCl tabs 3 grams q 8   - C/w Atorvastatin 40mg daily- monitor LFT   - C/w DVT PPX as below    CARDIOLOGY-  / HLD / Hypertension  - Goal - <150  - EP consult, ILR on 11/18 Monday  - No PFO on GREGORIO or intramural thrombus   - hold home blood pressure medication for now  -  EKG Results: NSR   - LDL results: 66  - Decrease Statin to 40mg  q day if LFT > 4x nl - D/C statin       PULMONOLOGY- Ex - smoker   - call provider if SPO2 < 95%    GI-  Mildly tranaminitis/ Dyspepsia  - Pepcid 20mg BID  - If LFT > 4 X nl - D/C statin   - No evidence of scopolamine causing increased LFT   - Diet: easy to chew - DASH diet   - Zofran prn - N/V   - Senna / Miralax / MOM 30cc q 8 prn   - last BM -  11/16/24   - No GI prophylaxis needed    RENAL/ELECTROLYTES  - off 3 % and check SMA-7 at 4 pm   - D/C horn   -Na Cl tabs  3 grams q 6 (wean off)  - Na goal 140- 150 for cerebral edema   - Replete K<4 and Mg <2    INFECTIOUS DISEASE  - Monitor  for fever     ENDOCRINE  Stroke Core measures as above     HEME/ONC - R peroneal DVT  Lovenox 40mg q day  ASA 81 mg qday   F/U DVT in 4 Days - 11/16- R peroneal DVT with NO propagation   L SCD      Dispo  - 3 E with Tele  64F. PMH: Hx of Cholecystitis s/p lap-roel, former smoker (13 pack years, quit 14 years ago) presented 11/9 c/o dizziness nausea and vomiting since waking up from sleep. NIHSS 0. CTH (+) for Acute infarct involving the left cerebellar hemisphere, CTP showed 5cc of penumbra in L hemisphere, with 4cc of core infarct. 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 No TNK OOW, No MT - due to low NIHSS. MRI showing large cerebellar infarct with partial effacement of 4th ventricle and mass effect on the medulla and left cerebellar tonsillar herniation. Upgraded to NICU 11/10 to start hypertonic saline, NIH at this time 1 for LUE dysmetria. Downgraded to stroke unit 11/7 once stable. Suspect L cerebellar stroke in L vertebral artery distribution secondary to SHEFALI vs ESUS.    NEURO  #stroke  WORKUP/PENDING  - ILR   PLAN  - antiplatelets/anticoagulants: aspirin 81mg daily , clopidogrel 75mg daily   - antilipemics: atorvastatin 40mg daily   - SBP goal:   - antihypertensives:  - q4h neuro and vitals checks    CARDIOVASCULAR  - SBP goal above    PULM  - call provider if SPO2 < 94%  - HOB >30 degree  - aspiration precautions    GI  - Diet: DASH/TLC  - GI prophylaxis: famotidine 20mg twice daily     #transaminitis - improving    #nausea  - scopolamine patch       - monitor and replete electrolytes  - monitor BUN/Cr  - NaCl reduced to 1g twice daily   - Na goal 140- 150 for cerebral edema     ID  - monitor for temperature <36C or >38C  - MRSA prophylaxis: not indicated at this time / chlorhexidine 2% cloths    ENDOCRINE  - monitor blood glucose/fingerstick, allowed to be up to 140-180    BEHAVIORAL  - delirium precautions    HEME/ONC  #R peroneal DVT    #normocytic anemia - monitor    #DVT prophylaxis: lovenox / SCDs    --------------------------------------------------  #Activity: ambulate as tolerated  #Code status:   #disposition:   - PT advises discharge to: acute rehab  - OT advises discharge to: acute rehab  - physiatry advises discharge to:   - patient planning to discharge to:  --------------------------------------------------  To do after discharge:  -   --------------------------------------------------  To do during admission:  - ILR  - wean NaCl

## 2024-11-18 NOTE — PROGRESS NOTE ADULT - ASSESSMENT
Imp: Rehab of L cerebellar stroke / gait ataxia / cholecystitis s/p lap roel  Plan: continue bedside therapy as tolerated            pt is making functional gains            Pt is screened for 4a rehab            will follow

## 2024-11-18 NOTE — PROGRESS NOTE ADULT - SUBJECTIVE AND OBJECTIVE BOX
Patient is a 64y old  Female who presents with a chief complaint of Stroke (18 Nov 2024 16:21)      HPI:  64-year-old female with PMHx of cholecystitis s/p lap roel, not on any medications, presents to ED for dizziness, nausea, and vomiting after waking up this morning. Last know well was the night before at 11:30pm. Patient with several days of cough, body aches, headache, and generalized weakness. Patient states she feels more dizzy with movement with unsteady gait. When lying in bed talking to author, she feels less dizzy. However, she still feels nauseas. She has right sided tinnitus for six years but it is never associated with dizziness. Patient denies hx of HTN, DM, HLD. She used to smoke tobacco 1/3 pack per day for 40 years, quit 14 years ago. Patient at baseline is independent living on her own.      CTH (+) for Acute infarct involving the left cerebellar hemisphere, CTP showed 5cc of penumbra in L hemisphere, with 4cc of core infarct. 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 No TNK OOW, No MT - due to low NIHSS. Admitted to stroke unit for further workup. 11/10 in AM, patient's MRI completed showing large cerebellar infarct with partial effacement of 4th ventricle and mass effect on the medulla and left cerebellar tonsillar herniation.       PHYSICAL EXAM    Vital Signs Last 24 Hrs  T(C): 36.9 (18 Nov 2024 12:44), Max: 36.9 (18 Nov 2024 12:44)  T(F): 98.5 (18 Nov 2024 12:44), Max: 98.5 (18 Nov 2024 12:44)  HR: 69 (18 Nov 2024 12:44) (67 - 77)  BP: 108/70 (18 Nov 2024 12:44) (108/70 - 122/72)  BP(mean): 89 (18 Nov 2024 04:35) (89 - 89)  RR: 18 (18 Nov 2024 12:44) (18 - 18)  SpO2: 96% (18 Nov 2024 04:35) (96% - 96%)    Parameters below as of 18 Nov 2024 12:44  Patient On (Oxygen Delivery Method): room air        Constitutional - NAD  Chest - CTA  Cardiovascular - S1S2+  Abdomen -  Soft  Extremities -  No calf tenderness   Function : bed mobility min A / transfer CG assist           ambulate [id number] CCG assist / upper body dressing CG assist and lower body dressing min A     acetaminophen     Tablet .. 650 milliGRAM(s) Oral every 6 hours PRN  aspirin  chewable 81 milliGRAM(s) Oral daily  atorvastatin 40 milliGRAM(s) Oral at bedtime  chlorhexidine 2% Cloths 1 Application(s) Topical <User Schedule>  clopidogrel Tablet 75 milliGRAM(s) Oral daily  enoxaparin Injectable 40 milliGRAM(s) SubCutaneous every 24 hours  famotidine    Tablet 20 milliGRAM(s) Oral two times a day  ondansetron Injectable 4 milliGRAM(s) IV Push three times a day PRN  polyethylene glycol 3350 17 Gram(s) Oral every 12 hours  scopolamine 1 mG/72 Hr(s) Patch 1 Patch Transdermal every 72 hours  senna 2 Tablet(s) Oral at bedtime      RECENT LABS/IMAGING                        11.7   6.17  )-----------( 224      ( 18 Nov 2024 07:29 )             35.7     11-18    144  |  106  |  9[L]  ----------------------------<  104[H]  4.1   |  27  |  0.6[L]    Ca    8.5      18 Nov 2024 07:29  Mg     2.5     11-17    TPro  6.1  /  Alb  3.5  /  TBili  0.7  /  DBili  x   /  AST  49[H]  /  ALT  68[H]  /  AlkPhos  91  11-18      Urinalysis Basic - ( 18 Nov 2024 07:29 )    Color: x / Appearance: x / SG: x / pH: x  Gluc: 104 mg/dL / Ketone: x  / Bili: x / Urobili: x   Blood: x / Protein: x / Nitrite: x   Leuk Esterase: x / RBC: x / WBC x   Sq Epi: x / Non Sq Epi: x / Bacteria: x

## 2024-11-18 NOTE — PROGRESS NOTE ADULT - SUBJECTIVE AND OBJECTIVE BOX
Neurology Progress Note    Interval History:     Medications:  acetaminophen     Tablet .. 650 milliGRAM(s) Oral every 6 hours PRN  aspirin  chewable 81 milliGRAM(s) Oral daily  atorvastatin 40 milliGRAM(s) Oral at bedtime  chlorhexidine 2% Cloths 1 Application(s) Topical <User Schedule>  clopidogrel Tablet 75 milliGRAM(s) Oral daily  enoxaparin Injectable 40 milliGRAM(s) SubCutaneous every 24 hours  famotidine    Tablet 20 milliGRAM(s) Oral two times a day  ondansetron Injectable 4 milliGRAM(s) IV Push three times a day PRN  polyethylene glycol 3350 17 Gram(s) Oral every 12 hours  scopolamine 1 mG/72 Hr(s) Patch 1 Patch Transdermal every 72 hours  senna 2 Tablet(s) Oral at bedtime    Vital Signs Last 24 Hrs  T(C): 36.9 (18 Nov 2024 12:44), Max: 36.9 (18 Nov 2024 12:44)  T(F): 98.5 (18 Nov 2024 12:44), Max: 98.5 (18 Nov 2024 12:44)  HR: 69 (18 Nov 2024 12:44) (67 - 77)  BP: 108/70 (18 Nov 2024 12:44) (108/68 - 122/72)  BP(mean): 89 (18 Nov 2024 04:35) (89 - 89)  RR: 18 (18 Nov 2024 12:44) (18 - 18)  SpO2: 96% (18 Nov 2024 04:35) (96% - 96%)    Parameters below as of 18 Nov 2024 12:44  Patient On (Oxygen Delivery Method): room air      Neurologic Physical Exam  Mental status: AOx  Language: Speech with intact fluency , naming , repetition , comprehension, absent dysarthria  Memory: Recent and remote memory intact. Follows commands. Attention/concentration intact. Fund of knowledge appropriate  Cranial nerves:   II: Visual fields are full to confrontation  III, IV, VI: Extraocular movements intact, no noticeable nystagmus. Pupils equally round and reactive to light  V:  Facial sensation V1-V3 equal and intact   VII: Face is symmetric with normal eye closure and smile  VIII: Hearing is intact bilaterally intact  IX, X: Uvula is midline and soft palate rises symmetrically, gag reflex intact  XI: Head turning and shoulder shrug are intact  XII: Tongue protrudes midline  Motor: Normal bulk and tone. No pronator drift.   - shoulder abduction     R 5/5     L 5/5  - elbow flexion     R 5/5     L 5/5  - elbow extension     R 5/5     L 5/5  - wrist flexion     R 5/5     L 5/5  - wrist extension     R 5/5     L 5/5  - finger flexion     R 5/5     L 5/5  - finger extension     R 5/5     L 5/5  - finger abduction     R 5/5     L 5/5  - hip flexion     R 5/5     L 5/5  - knee flexion     R 5/5     L 5/5  - knee extension     R 5/5     L 5/5  - dorsiflexion     R 5/5     L 5/5  - plantarflexion     R 5/5     L 5/5  - pronator drift     absent / present L / R  Sensation: Intact to light touch bilaterally. No neglect or extinction on double simultaneous testing.  Coordination: No dysmetria on finger-to-nose and heel-to-shin bilaterally, rapid alternating movements intact and symmetric  Reflexes:   - biceps     R 2+     L 2+  - triceps     R 2+     L 2+  - brachioradialis     R 2+     L 2+  - patellar     R 2+     L 2+  - achillles     R 2+     L 2+  - babinski     R upgoing / downgoing     L upgoing / downgoing  - Wheatley     R absent / present     L absent / present  Gait: Narrow gait and steady, Romberg sign negative / Deferred for patient safety    NIHSS:     Labs:  CBC Full  -  ( 18 Nov 2024 07:29 )  WBC Count : 6.17 K/uL  RBC Count : 4.29 M/uL  Hemoglobin : 11.7 g/dL  Hematocrit : 35.7 %  Platelet Count - Automated : 224 K/uL  Mean Cell Volume : 83.2 fL  Mean Cell Hemoglobin : 27.3 pg  Mean Cell Hemoglobin Concentration : 32.8 g/dL  Auto Neutrophil # : 3.93 K/uL  Auto Lymphocyte # : 1.54 K/uL  Auto Monocyte # : 0.48 K/uL  Auto Eosinophil # : 0.17 K/uL  Auto Basophil # : 0.04 K/uL  Auto Neutrophil % : 63.6 %  Auto Lymphocyte % : 25.0 %  Auto Monocyte % : 7.8 %  Auto Eosinophil % : 2.8 %  Auto Basophil % : 0.6 %    11-18    144  |  106  |  9[L]  ----------------------------<  104[H]  4.1   |  27  |  0.6[L]    Ca    8.5      18 Nov 2024 07:29  Mg     2.5     11-17    TPro  6.1  /  Alb  3.5  /  TBili  0.7  /  DBili  x   /  AST  49[H]  /  ALT  68[H]  /  AlkPhos  91  11-18    LIVER FUNCTIONS - ( 18 Nov 2024 07:29 )  Alb: 3.5 g/dL / Pro: 6.1 g/dL / ALK PHOS: 91 U/L / ALT: 68 U/L / AST: 49 U/L / GGT: x             Urinalysis Basic - ( 18 Nov 2024 07:29 )    Color: x / Appearance: x / SG: x / pH: x  Gluc: 104 mg/dL / Ketone: x  / Bili: x / Urobili: x   Blood: x / Protein: x / Nitrite: x   Leuk Esterase: x / RBC: x / WBC x   Sq Epi: x / Non Sq Epi: x / Bacteria: x        Pertinent studies reviewed:    STROKE IMAGING  [] CTH -   [] CTA head/Neck -   [] CTP -   [] MRI brain noncontrast -   [] TTE - EF [%] PFO [absent / present] Significant valvular disease [absent / present]  [] GREGORIO - EF [%] PFO [absent / present] Significant valvular disease [absent / present]    ADDITIONAL IMAGING   Neurology Progress Note    Interval History: eating, sleeping, stooling, voiding, appropriately     Medications:  acetaminophen     Tablet .. 650 milliGRAM(s) Oral every 6 hours PRN  aspirin  chewable 81 milliGRAM(s) Oral daily  atorvastatin 40 milliGRAM(s) Oral at bedtime  chlorhexidine 2% Cloths 1 Application(s) Topical <User Schedule>  clopidogrel Tablet 75 milliGRAM(s) Oral daily  enoxaparin Injectable 40 milliGRAM(s) SubCutaneous every 24 hours  famotidine    Tablet 20 milliGRAM(s) Oral two times a day  ondansetron Injectable 4 milliGRAM(s) IV Push three times a day PRN  polyethylene glycol 3350 17 Gram(s) Oral every 12 hours  scopolamine 1 mG/72 Hr(s) Patch 1 Patch Transdermal every 72 hours  senna 2 Tablet(s) Oral at bedtime    Vital Signs Last 24 Hrs  T(C): 36.9 (18 Nov 2024 12:44), Max: 36.9 (18 Nov 2024 12:44)  T(F): 98.5 (18 Nov 2024 12:44), Max: 98.5 (18 Nov 2024 12:44)  HR: 69 (18 Nov 2024 12:44) (67 - 77)  BP: 108/70 (18 Nov 2024 12:44) (108/68 - 122/72)  BP(mean): 89 (18 Nov 2024 04:35) (89 - 89)  RR: 18 (18 Nov 2024 12:44) (18 - 18)  SpO2: 96% (18 Nov 2024 04:35) (96% - 96%)    Parameters below as of 18 Nov 2024 12:44  Patient On (Oxygen Delivery Method): room air      Neurologic Physical Exam  Mental status: AOx3  Language: Speech with intact fluency , naming , repetition , comprehension, absent dysarthria  Memory: Recent and remote memory intact. Follows commands. Attention/concentration intact. Fund of knowledge appropriate  Cranial nerves:   II: Visual fields are full to confrontation  III, IV, VI: Extraocular movements intact, no noticeable nystagmus. Pupils equally round and reactive to light  V:  Facial sensation V1-V3 equal and intact   VII: Face is symmetric with normal eye closure and smile  VIII: Hearing is intact bilaterally intact  IX, X: Uvula is midline and soft palate rises symmetrically, gag reflex intact  XI: Head turning and shoulder shrug are intact  XII: Tongue protrudes midline  Motor: Normal bulk and tone. No pronator drift.   - shoulder abduction     R 5/5     L 5/5  - elbow flexion     R 5/5     L 5/5  - elbow extension     R 5/5     L 5/5  - hip flexion     R 5/5     L 5/5  - knee flexion     R 5/5     L 5/5  - knee extension     R 5/5     L 5/5  - dorsiflexion     R 5/5     L 5/5  - plantarflexion     R 5/5     L 5/5  Sensation: Intact to light touch bilaterally. No neglect or extinction on double simultaneous testing.  Coordination: No dysmetria on finger-to-nose and heel-to-shin bilaterally, rapid alternating movements intact and symmetric  Reflexes:   - biceps     R 2+     L 2+  - triceps     R 2+     L 2+  - brachioradialis     R 2+     L 2+  - patellar     R 2+     L 2+  - achillles     R 2+     L 2+  - babinski     R downgoing     L downgoing  - Wheatley     R absent / present     L absent / present  Gait: Narrow gait and steady, Romberg sign negative / Deferred for patient safety    NIHSS: 0    Labs:  CBC Full  -  ( 18 Nov 2024 07:29 )  WBC Count : 6.17 K/uL  RBC Count : 4.29 M/uL  Hemoglobin : 11.7 g/dL  Hematocrit : 35.7 %  Platelet Count - Automated : 224 K/uL  Mean Cell Volume : 83.2 fL  Mean Cell Hemoglobin : 27.3 pg  Mean Cell Hemoglobin Concentration : 32.8 g/dL  Auto Neutrophil # : 3.93 K/uL  Auto Lymphocyte # : 1.54 K/uL  Auto Monocyte # : 0.48 K/uL  Auto Eosinophil # : 0.17 K/uL  Auto Basophil # : 0.04 K/uL  Auto Neutrophil % : 63.6 %  Auto Lymphocyte % : 25.0 %  Auto Monocyte % : 7.8 %  Auto Eosinophil % : 2.8 %  Auto Basophil % : 0.6 %    11-18    144  |  106  |  9[L]  ----------------------------<  104[H]  4.1   |  27  |  0.6[L]    Ca    8.5      18 Nov 2024 07:29  Mg     2.5     11-17    TPro  6.1  /  Alb  3.5  /  TBili  0.7  /  DBili  x   /  AST  49[H]  /  ALT  68[H]  /  AlkPhos  91  11-18    LIVER FUNCTIONS - ( 18 Nov 2024 07:29 )  Alb: 3.5 g/dL / Pro: 6.1 g/dL / ALK PHOS: 91 U/L / ALT: 68 U/L / AST: 49 U/L / GGT: x             Urinalysis Basic - ( 18 Nov 2024 07:29 )    Color: x / Appearance: x / SG: x / pH: x  Gluc: 104 mg/dL / Ketone: x  / Bili: x / Urobili: x   Blood: x / Protein: x / Nitrite: x   Leuk Esterase: x / RBC: x / WBC x   Sq Epi: x / Non Sq Epi: x / Bacteria: x        Pertinent studies reviewed:    STROKE IMAGING  [] CTH -   [] CTA head/Neck -   [] CTP -   [] MRI brain noncontrast -   [] TTE - EF [%] PFO [absent / present] Significant valvular disease [absent / present]  [] GREGORIO - EF [%] PFO [absent / present] Significant valvular disease [absent / present]    ADDITIONAL IMAGING

## 2024-11-19 LAB
ALBUMIN SERPL ELPH-MCNC: 3.8 G/DL — SIGNIFICANT CHANGE UP (ref 3.5–5.2)
ALP SERPL-CCNC: 105 U/L — SIGNIFICANT CHANGE UP (ref 30–115)
ALT FLD-CCNC: 58 U/L — HIGH (ref 0–41)
ANION GAP SERPL CALC-SCNC: 9 MMOL/L — SIGNIFICANT CHANGE UP (ref 7–14)
AST SERPL-CCNC: 39 U/L — SIGNIFICANT CHANGE UP (ref 0–41)
BASOPHILS # BLD AUTO: 0.05 K/UL — SIGNIFICANT CHANGE UP (ref 0–0.2)
BASOPHILS NFR BLD AUTO: 0.7 % — SIGNIFICANT CHANGE UP (ref 0–1)
BILIRUB SERPL-MCNC: 0.4 MG/DL — SIGNIFICANT CHANGE UP (ref 0.2–1.2)
BUN SERPL-MCNC: 10 MG/DL — SIGNIFICANT CHANGE UP (ref 10–20)
CALCIUM SERPL-MCNC: 8.7 MG/DL — SIGNIFICANT CHANGE UP (ref 8.4–10.4)
CHLORIDE SERPL-SCNC: 105 MMOL/L — SIGNIFICANT CHANGE UP (ref 98–110)
CO2 SERPL-SCNC: 27 MMOL/L — SIGNIFICANT CHANGE UP (ref 17–32)
CREAT SERPL-MCNC: 0.6 MG/DL — LOW (ref 0.7–1.5)
EGFR: 100 ML/MIN/1.73M2 — SIGNIFICANT CHANGE UP
EOSINOPHIL # BLD AUTO: 0.2 K/UL — SIGNIFICANT CHANGE UP (ref 0–0.7)
EOSINOPHIL NFR BLD AUTO: 2.9 % — SIGNIFICANT CHANGE UP (ref 0–8)
GLUCOSE SERPL-MCNC: 104 MG/DL — HIGH (ref 70–99)
HCT VFR BLD CALC: 37.9 % — SIGNIFICANT CHANGE UP (ref 37–47)
HGB BLD-MCNC: 12.6 G/DL — SIGNIFICANT CHANGE UP (ref 12–16)
IMM GRANULOCYTES NFR BLD AUTO: 0.7 % — HIGH (ref 0.1–0.3)
LYMPHOCYTES # BLD AUTO: 2.19 K/UL — SIGNIFICANT CHANGE UP (ref 1.2–3.4)
LYMPHOCYTES # BLD AUTO: 31.6 % — SIGNIFICANT CHANGE UP (ref 20.5–51.1)
MAGNESIUM SERPL-MCNC: 2.3 MG/DL — SIGNIFICANT CHANGE UP (ref 1.8–2.4)
MCHC RBC-ENTMCNC: 27.9 PG — SIGNIFICANT CHANGE UP (ref 27–31)
MCHC RBC-ENTMCNC: 33.2 G/DL — SIGNIFICANT CHANGE UP (ref 32–37)
MCV RBC AUTO: 84 FL — SIGNIFICANT CHANGE UP (ref 81–99)
MONOCYTES # BLD AUTO: 0.45 K/UL — SIGNIFICANT CHANGE UP (ref 0.1–0.6)
MONOCYTES NFR BLD AUTO: 6.5 % — SIGNIFICANT CHANGE UP (ref 1.7–9.3)
NEUTROPHILS # BLD AUTO: 3.98 K/UL — SIGNIFICANT CHANGE UP (ref 1.4–6.5)
NEUTROPHILS NFR BLD AUTO: 57.6 % — SIGNIFICANT CHANGE UP (ref 42.2–75.2)
NRBC # BLD: 0 /100 WBCS — SIGNIFICANT CHANGE UP (ref 0–0)
PHOSPHATE SERPL-MCNC: 4.3 MG/DL — SIGNIFICANT CHANGE UP (ref 2.1–4.9)
PLATELET # BLD AUTO: 247 K/UL — SIGNIFICANT CHANGE UP (ref 130–400)
PMV BLD: 10.5 FL — HIGH (ref 7.4–10.4)
POTASSIUM SERPL-MCNC: 4 MMOL/L — SIGNIFICANT CHANGE UP (ref 3.5–5)
POTASSIUM SERPL-SCNC: 4 MMOL/L — SIGNIFICANT CHANGE UP (ref 3.5–5)
PROT SERPL-MCNC: 6.3 G/DL — SIGNIFICANT CHANGE UP (ref 6–8)
RBC # BLD: 4.51 M/UL — SIGNIFICANT CHANGE UP (ref 4.2–5.4)
RBC # FLD: 13 % — SIGNIFICANT CHANGE UP (ref 11.5–14.5)
SODIUM SERPL-SCNC: 141 MMOL/L — SIGNIFICANT CHANGE UP (ref 135–146)
WBC # BLD: 6.92 K/UL — SIGNIFICANT CHANGE UP (ref 4.8–10.8)
WBC # FLD AUTO: 6.92 K/UL — SIGNIFICANT CHANGE UP (ref 4.8–10.8)

## 2024-11-19 RX ADMIN — FAMOTIDINE 20 MILLIGRAM(S): 20 TABLET, FILM COATED ORAL at 17:42

## 2024-11-19 RX ADMIN — CLOPIDOGREL 75 MILLIGRAM(S): 75 TABLET, FILM COATED ORAL at 11:16

## 2024-11-19 RX ADMIN — SODIUM CHLORIDE 1 GRAM(S): 9 INJECTION, SOLUTION INTRAMUSCULAR; INTRAVENOUS; SUBCUTANEOUS at 05:10

## 2024-11-19 RX ADMIN — CHLORHEXIDINE GLUCONATE 1 APPLICATION(S): 1.2 RINSE ORAL at 05:10

## 2024-11-19 RX ADMIN — ENOXAPARIN SODIUM 40 MILLIGRAM(S): 30 INJECTION SUBCUTANEOUS at 05:10

## 2024-11-19 RX ADMIN — Medication 81 MILLIGRAM(S): at 11:15

## 2024-11-19 RX ADMIN — SCOPOLAMINE 1 PATCH: 1 PATCH, EXTENDED RELEASE TRANSDERMAL at 09:49

## 2024-11-19 RX ADMIN — Medication 40 MILLIGRAM(S): at 21:10

## 2024-11-19 RX ADMIN — SCOPOLAMINE 1 PATCH: 1 PATCH, EXTENDED RELEASE TRANSDERMAL at 19:35

## 2024-11-19 RX ADMIN — FAMOTIDINE 20 MILLIGRAM(S): 20 TABLET, FILM COATED ORAL at 05:10

## 2024-11-19 NOTE — PROGRESS NOTE ADULT - SUBJECTIVE AND OBJECTIVE BOX
Neurology Progress Note    Interval History: eating, sleeping, stooling, voiding, ambulating appropriately     Medications:  acetaminophen     Tablet .. 650 milliGRAM(s) Oral every 6 hours PRN  aspirin  chewable 81 milliGRAM(s) Oral daily  atorvastatin 40 milliGRAM(s) Oral at bedtime  chlorhexidine 2% Cloths 1 Application(s) Topical <User Schedule>  clopidogrel Tablet 75 milliGRAM(s) Oral daily  enoxaparin Injectable 40 milliGRAM(s) SubCutaneous every 24 hours  famotidine    Tablet 20 milliGRAM(s) Oral two times a day  ondansetron Injectable 4 milliGRAM(s) IV Push three times a day PRN  polyethylene glycol 3350 17 Gram(s) Oral every 12 hours  scopolamine 1 mG/72 Hr(s) Patch 1 Patch Transdermal every 72 hours  senna 2 Tablet(s) Oral at bedtime    Vital Signs Last 24 Hrs  T(C): 37 (19 Nov 2024 14:00), Max: 37.2 (19 Nov 2024 05:08)  T(F): 98.6 (19 Nov 2024 14:00), Max: 99 (19 Nov 2024 05:08)  HR: 76 (19 Nov 2024 14:00) (69 - 76)  BP: 104/64 (19 Nov 2024 14:00) (104/64 - 112/70)  BP(mean): 77 (19 Nov 2024 14:00) (77 - 82)  RR: 18 (19 Nov 2024 14:00) (18 - 18)  SpO2: 96% (19 Nov 2024 14:00) (96% - 96%)    Parameters below as of 19 Nov 2024 14:00  Patient On (Oxygen Delivery Method): room air      Neurologic Physical Exam  Mental status: AOx3  Language: Speech with intact fluency , naming , repetition , comprehension, absent dysarthria  Memory: Recent and remote memory intact. Follows commands. Attention/concentration intact. Fund of knowledge appropriate  Cranial nerves:   II: Visual fields are full to confrontation  III, IV, VI: Extraocular movements intact, no noticeable nystagmus. Pupils equally round and reactive to light  V:  Facial sensation V1-V3 equal and intact   VII: Face is symmetric with normal eye closure and smile  VIII: Hearing is intact bilaterally intact  IX, X: Uvula is midline and soft palate rises symmetrically, gag reflex intact  XI: Head turning and shoulder shrug are intact  XII: Tongue protrudes midline  Motor: Normal bulk and tone. No pronator drift.   - shoulder abduction     R 5/5     L 5/5  - elbow flexion     R 5/5     L 5/5  - elbow extension     R 5/5     L 5/5  - hip flexion     R 5/5     L 5/5  - knee flexion     R 5/5     L 5/5  - knee extension     R 5/5     L 5/5  - dorsiflexion     R 5/5     L 5/5  - plantarflexion     R 5/5     L 5/5  Sensation: Intact to light touch bilaterally. No neglect or extinction on double simultaneous testing.  Coordination: No dysmetria on finger-to-nose and heel-to-shin bilaterally, rapid alternating movements intact and symmetric  Reflexes:   - biceps     R 2+     L 2+  - triceps     R 2+     L 2+  - brachioradialis     R 2+     L 2+  - patellar     R 2+     L 2+  - achillles     R 2+     L 2+  - babinski     R downgoing     L downgoing  - Wheatley     R absent / present     L absent / present  Gait: Narrow gait and steady, Romberg sign negative / Deferred for patient safety    NIHSS: 0    Labs:  CBC Full  -  ( 19 Nov 2024 05:31 )  WBC Count : 6.92 K/uL  RBC Count : 4.51 M/uL  Hemoglobin : 12.6 g/dL  Hematocrit : 37.9 %  Platelet Count - Automated : 247 K/uL  Mean Cell Volume : 84.0 fL  Mean Cell Hemoglobin : 27.9 pg  Mean Cell Hemoglobin Concentration : 33.2 g/dL  Auto Neutrophil # : 3.98 K/uL  Auto Lymphocyte # : 2.19 K/uL  Auto Monocyte # : 0.45 K/uL  Auto Eosinophil # : 0.20 K/uL  Auto Basophil # : 0.05 K/uL  Auto Neutrophil % : 57.6 %  Auto Lymphocyte % : 31.6 %  Auto Monocyte % : 6.5 %  Auto Eosinophil % : 2.9 %  Auto Basophil % : 0.7 %    11-19    141  |  105  |  10  ----------------------------<  104[H]  4.0   |  27  |  0.6[L]    Ca    8.7      19 Nov 2024 05:31  Phos  4.3     11-19  Mg     2.3     11-19    TPro  6.3  /  Alb  3.8  /  TBili  0.4  /  DBili  x   /  AST  39  /  ALT  58[H]  /  AlkPhos  105  11-19    LIVER FUNCTIONS - ( 19 Nov 2024 05:31 )  Alb: 3.8 g/dL / Pro: 6.3 g/dL / ALK PHOS: 105 U/L / ALT: 58 U/L / AST: 39 U/L / GGT: x             Urinalysis Basic - ( 19 Nov 2024 05:31 )    Color: x / Appearance: x / SG: x / pH: x  Gluc: 104 mg/dL / Ketone: x  / Bili: x / Urobili: x   Blood: x / Protein: x / Nitrite: x   Leuk Esterase: x / RBC: x / WBC x   Sq Epi: x / Non Sq Epi: x / Bacteria: x

## 2024-11-19 NOTE — PROGRESS NOTE ADULT - ASSESSMENT
64F. PMH: Hx of Cholecystitis s/p lap-roel, former smoker (13 pack years, quit 14 years ago) presented 11/9 c/o dizziness nausea and vomiting since waking up from sleep. NIHSS 0. CTH (+) for Acute infarct involving the left cerebellar hemisphere, CTP showed 5cc of penumbra in L hemisphere, with 4cc of core infarct. 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 No TNK OOW, No MT - due to low NIHSS. MRI showing large cerebellar infarct with partial effacement of 4th ventricle and mass effect on the medulla and left cerebellar tonsillar herniation. Upgraded to NICU 11/10 to start hypertonic saline, NIH at this time 1 for LUE dysmetria. Downgraded to stroke unit 11/7 once stable. Suspect L cerebellar stroke in L vertebral artery distribution secondary to SHEFALI vs ESUS.    NEURO  #stroke  WORKUP/PENDING  - ILR   PLAN  - antiplatelets/anticoagulants: aspirin 81mg daily , clopidogrel 75mg daily   - antilipemics: atorvastatin 40mg daily   - SBP goal:   - antihypertensives:  - q4h neuro and vitals checks    CARDIOVASCULAR  - SBP goal above    PULM  - call provider if SPO2 < 94%  - HOB >30 degree  - aspiration precautions    GI  - Diet: DASH/TLC  - GI prophylaxis: famotidine 20mg twice daily     #transaminitis - improving    #nausea  - scopolamine patch       - monitor and replete electrolytes  - monitor BUN/Cr  - NaCl reduced to 1g twice daily   - Na goal 140- 150 for cerebral edema     ID  - monitor for temperature <36C or >38C  - MRSA prophylaxis: not indicated at this time / chlorhexidine 2% cloths    ENDOCRINE  - monitor blood glucose/fingerstick, allowed to be up to 140-180    BEHAVIORAL  - delirium precautions    HEME/ONC  #R peroneal DVT    #normocytic anemia - monitor    #DVT prophylaxis: lovenox / SCDs    --------------------------------------------------  #Activity: ambulate as tolerated  #Code status:   #disposition:   - PT advises discharge to: acute rehab  - OT advises discharge to: acute rehab  - physiatry advises discharge to:   - patient planning to discharge to:  --------------------------------------------------  To do after discharge:  -   --------------------------------------------------  To do during admission:  - ILR  - wean NaCl Rx Sent, LVM for Pt   64F. PMH: Hx of Cholecystitis s/p lap-roel, former smoker (13 pack years, quit 14 years ago) presented 11/9 c/o dizziness nausea and vomiting since waking up from sleep. NIHSS 0. CTH (+) for Acute infarct involving the left cerebellar hemisphere, CTP showed 5cc of penumbra in L hemisphere, with 4cc of core infarct. 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 No TNK OOW, No MT - due to low NIHSS. MRI showing large cerebellar infarct with partial effacement of 4th ventricle and mass effect on the medulla and left cerebellar tonsillar herniation. Upgraded to NICU 11/10 to start hypertonic saline, NIH at this time 1 for LUE dysmetria. Downgraded to stroke unit 11/7 once stable. Suspect L cerebellar stroke in L vertebral artery distribution secondary to SHEFALI vs ESUS.    NEURO  #stroke  WORKUP/PENDING  - ILR   PLAN  - antiplatelets/anticoagulants: aspirin 81mg daily , clopidogrel 75mg daily   - antilipemics: atorvastatin 40mg daily   - SBP goal:   - antihypertensives:  - q4h neuro and vitals checks    CARDIOVASCULAR  - SBP goal above    PULM  - call provider if SPO2 < 94%  - HOB >30 degree  - aspiration precautions    GI  - Diet: DASH/TLC  - GI prophylaxis: famotidine 20mg twice daily     #transaminitis - improving    #nausea  - scopolamine patch       - monitor and replete electrolytes  - monitor BUN/Cr  - NaCl > discontinued  - Na goal 140- 150 for cerebral edema     ID  - monitor for temperature <36C or >38C  - MRSA prophylaxis: not indicated at this time / chlorhexidine 2% cloths    ENDOCRINE  - monitor blood glucose/fingerstick, allowed to be up to 140-180    BEHAVIORAL  - delirium precautions    HEME/ONC  #R peroneal DVT    #normocytic anemia - monitor    #DVT prophylaxis: lovenox / SCDs    --------------------------------------------------  #Activity: ambulate as tolerated  #Code status:   #disposition:   - PT advises discharge to: acute rehab  - OT advises discharge to: acute rehab  - physiatry advises discharge to:   - patient planning to discharge to:  --------------------------------------------------  To do after discharge:  -   --------------------------------------------------  To do during admission:  Kaitlynn son

## 2024-11-20 ENCOUNTER — TRANSCRIPTION ENCOUNTER (OUTPATIENT)
Age: 64
End: 2024-11-20

## 2024-11-20 LAB
ALBUMIN SERPL ELPH-MCNC: 3.9 G/DL — SIGNIFICANT CHANGE UP (ref 3.5–5.2)
ALP SERPL-CCNC: 100 U/L — SIGNIFICANT CHANGE UP (ref 30–115)
ALT FLD-CCNC: 55 U/L — HIGH (ref 0–41)
ANION GAP SERPL CALC-SCNC: 11 MMOL/L — SIGNIFICANT CHANGE UP (ref 7–14)
AST SERPL-CCNC: 39 U/L — SIGNIFICANT CHANGE UP (ref 0–41)
BASOPHILS # BLD AUTO: 0.04 K/UL — SIGNIFICANT CHANGE UP (ref 0–0.2)
BASOPHILS NFR BLD AUTO: 0.6 % — SIGNIFICANT CHANGE UP (ref 0–1)
BILIRUB SERPL-MCNC: 0.7 MG/DL — SIGNIFICANT CHANGE UP (ref 0.2–1.2)
BUN SERPL-MCNC: 9 MG/DL — LOW (ref 10–20)
CALCIUM SERPL-MCNC: 9 MG/DL — SIGNIFICANT CHANGE UP (ref 8.4–10.5)
CHLORIDE SERPL-SCNC: 105 MMOL/L — SIGNIFICANT CHANGE UP (ref 98–110)
CO2 SERPL-SCNC: 25 MMOL/L — SIGNIFICANT CHANGE UP (ref 17–32)
CREAT SERPL-MCNC: 0.6 MG/DL — LOW (ref 0.7–1.5)
EGFR: 100 ML/MIN/1.73M2 — SIGNIFICANT CHANGE UP
EOSINOPHIL # BLD AUTO: 0.21 K/UL — SIGNIFICANT CHANGE UP (ref 0–0.7)
EOSINOPHIL NFR BLD AUTO: 3 % — SIGNIFICANT CHANGE UP (ref 0–8)
GLUCOSE SERPL-MCNC: 93 MG/DL — SIGNIFICANT CHANGE UP (ref 70–99)
HCT VFR BLD CALC: 38.4 % — SIGNIFICANT CHANGE UP (ref 37–47)
HGB BLD-MCNC: 13.8 G/DL — SIGNIFICANT CHANGE UP (ref 12–16)
IMM GRANULOCYTES NFR BLD AUTO: 0.1 % — SIGNIFICANT CHANGE UP (ref 0.1–0.3)
LYMPHOCYTES # BLD AUTO: 2.16 K/UL — SIGNIFICANT CHANGE UP (ref 1.2–3.4)
LYMPHOCYTES # BLD AUTO: 31 % — SIGNIFICANT CHANGE UP (ref 20.5–51.1)
MAGNESIUM SERPL-MCNC: 2.3 MG/DL — SIGNIFICANT CHANGE UP (ref 1.8–2.4)
MCHC RBC-ENTMCNC: 29.7 PG — SIGNIFICANT CHANGE UP (ref 27–31)
MCHC RBC-ENTMCNC: 35.9 G/DL — SIGNIFICANT CHANGE UP (ref 32–37)
MCV RBC AUTO: 82.8 FL — SIGNIFICANT CHANGE UP (ref 81–99)
MONOCYTES # BLD AUTO: 0.56 K/UL — SIGNIFICANT CHANGE UP (ref 0.1–0.6)
MONOCYTES NFR BLD AUTO: 8 % — SIGNIFICANT CHANGE UP (ref 1.7–9.3)
NEUTROPHILS # BLD AUTO: 3.98 K/UL — SIGNIFICANT CHANGE UP (ref 1.4–6.5)
NEUTROPHILS NFR BLD AUTO: 57.3 % — SIGNIFICANT CHANGE UP (ref 42.2–75.2)
NRBC # BLD: 0 /100 WBCS — SIGNIFICANT CHANGE UP (ref 0–0)
PHOSPHATE SERPL-MCNC: 4.2 MG/DL — SIGNIFICANT CHANGE UP (ref 2.1–4.9)
PLATELET # BLD AUTO: 275 K/UL — SIGNIFICANT CHANGE UP (ref 130–400)
PMV BLD: 10.5 FL — HIGH (ref 7.4–10.4)
POTASSIUM SERPL-MCNC: 4.3 MMOL/L — SIGNIFICANT CHANGE UP (ref 3.5–5)
POTASSIUM SERPL-SCNC: 4.3 MMOL/L — SIGNIFICANT CHANGE UP (ref 3.5–5)
PROT SERPL-MCNC: 6.5 G/DL — SIGNIFICANT CHANGE UP (ref 6–8)
RBC # BLD: 4.64 M/UL — SIGNIFICANT CHANGE UP (ref 4.2–5.4)
RBC # FLD: 13 % — SIGNIFICANT CHANGE UP (ref 11.5–14.5)
SODIUM SERPL-SCNC: 141 MMOL/L — SIGNIFICANT CHANGE UP (ref 135–146)
WBC # BLD: 6.96 K/UL — SIGNIFICANT CHANGE UP (ref 4.8–10.8)
WBC # FLD AUTO: 6.96 K/UL — SIGNIFICANT CHANGE UP (ref 4.8–10.8)

## 2024-11-20 PROCEDURE — 99232 SBSQ HOSP IP/OBS MODERATE 35: CPT | Mod: GC

## 2024-11-20 RX ORDER — 0.9 % SODIUM CHLORIDE 0.9 %
500 INTRAVENOUS SOLUTION INTRAVENOUS ONCE
Refills: 0 | Status: DISCONTINUED | OUTPATIENT
Start: 2024-11-20 | End: 2024-11-21

## 2024-11-20 RX ADMIN — CHLORHEXIDINE GLUCONATE 1 APPLICATION(S): 1.2 RINSE ORAL at 05:06

## 2024-11-20 RX ADMIN — SCOPOLAMINE 1 PATCH: 1 PATCH, EXTENDED RELEASE TRANSDERMAL at 07:51

## 2024-11-20 RX ADMIN — FAMOTIDINE 20 MILLIGRAM(S): 20 TABLET, FILM COATED ORAL at 17:19

## 2024-11-20 RX ADMIN — SCOPOLAMINE 1 PATCH: 1 PATCH, EXTENDED RELEASE TRANSDERMAL at 19:07

## 2024-11-20 RX ADMIN — FAMOTIDINE 20 MILLIGRAM(S): 20 TABLET, FILM COATED ORAL at 05:06

## 2024-11-20 RX ADMIN — Medication 40 MILLIGRAM(S): at 21:09

## 2024-11-20 RX ADMIN — ACETAMINOPHEN 500MG 650 MILLIGRAM(S): 500 TABLET, COATED ORAL at 08:35

## 2024-11-20 RX ADMIN — Medication 2 TABLET(S): at 21:09

## 2024-11-20 RX ADMIN — Medication 81 MILLIGRAM(S): at 11:09

## 2024-11-20 RX ADMIN — CLOPIDOGREL 75 MILLIGRAM(S): 75 TABLET, FILM COATED ORAL at 11:09

## 2024-11-20 RX ADMIN — ENOXAPARIN SODIUM 40 MILLIGRAM(S): 30 INJECTION SUBCUTANEOUS at 05:06

## 2024-11-20 NOTE — DISCHARGE NOTE PROVIDER - NSDCMRMEDTOKEN_GEN_ALL_CORE_FT
aspirin 81 mg oral tablet, chewable: 1 tab(s) orally once a day  atorvastatin 40 mg oral tablet: 1 tab(s) orally once a day (at bedtime)  clopidogrel 75 mg oral tablet: 1 tab(s) orally once a day PLEASE TAKE FROM 11/22-30 ONLY  famotidine 20 mg oral tablet: 1 tab(s) orally 2 times a day  meclizine 12.5 mg oral tablet: 1 tab(s) orally every 12 hours as needed for Dizziness CAN INCREASE TO ONE TABLET EVERY 6 HOURS IF NEEDED  polyethylene glycol 3350 oral powder for reconstitution: 17 gram(s) orally every 12 hours as needed for  constipation  senna leaf extract oral tablet: 2 tab(s) orally once a day (at bedtime) as needed for  constipation

## 2024-11-20 NOTE — DISCHARGE NOTE PROVIDER - HOSPITAL COURSE
Hospital course:  64F. PMH: Hx of Cholecystitis s/p lap-roel, former smoker (13 pack years, quit 14 years ago) presented 11/9 c/o dizziness nausea and vomiting since waking up from sleep. NIHSS 0. CTH (+) for Acute infarct involving the left cerebellar hemisphere, CTP showed 5cc of penumbra in L hemisphere, with 4cc of core infarct. 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 No TNK OOW, No MT - due to low NIHSS. MRI showing large cerebellar infarct with partial effacement of 4th ventricle and mass effect on the medulla and left cerebellar tonsillar herniation. Upgraded to NICU 11/10 to start hypertonic saline, NIH at this time 1 for LUE dysmetria. Downgraded to stroke unit 11/7 once stable. Suspect L cerebellar stroke in L vertebral artery distribution secondary to SHEFALI vs ESUS.    Patient had the following workup done in house:  CT HEAD: 11/9/24  Acute infarct involving the left cerebellarhemisphere without evidence   for hemorrhage.    CTA HEAD/NECK: 11/9/24  Occluded left vertebral artery.    MR Head Non Contrast: 11/14/24  Interval development of patchy petechial hemorrhagic transformation in   the left inferior cerebellar infarct since the prior CT head from   11/13/2024. Persistent mass effect on the medulla and right cerebellum.    Improved patency of the previously occluded left vertebral artery (likely   in the V4 segment) with mild residual stenosis in the distal V4 segment.   No evidence of intramural hematoma to suggest dissection.    Physical exam at discharge:  Mental status: AOx3  Language: Speech with intact fluency , naming , repetition , comprehension, absent dysarthria  Memory: Recent and remote memory intact. Follows commands. Attention/concentration intact. Fund of knowledge appropriate  Cranial nerves:   II: Visual fields are full to confrontation  III, IV, VI: Extraocular movements intact, no noticeable nystagmus. Pupils equally round and reactive to light  V:  Facial sensation V1-V3 equal and intact   VII: Face is symmetric with normal eye closure and smile  VIII: Hearing is intact bilaterally intact  IX, X: Uvula is midline and soft palate rises symmetrically, gag reflex intact  XI: Head turning and shoulder shrug are intact  XII: Tongue protrudes midline  Motor: Normal bulk and tone. No pronator drift.   - shoulder abduction     R 5/5     L 5/5  - elbow flexion     R 5/5     L 5/5  - elbow extension     R 5/5     L 5/5  - hip flexion     R 5/5     L 5/5  - knee flexion     R 5/5     L 5/5  - knee extension     R 5/5     L 5/5  - dorsiflexion     R 5/5     L 5/5  - plantarflexion     R 5/5     L 5/5  Sensation: Intact to light touch bilaterally. No neglect or extinction on double simultaneous testing.  Coordination: No dysmetria on finger-to-nose and heel-to-shin bilaterally, rapid alternating movements intact and symmetric  Reflexes:   - biceps     R 2+     L 2+  - triceps     R 2+     L 2+  - brachioradialis     R 2+     L 2+  - patellar     R 2+     L 2+  - achillles     R 2+     L 2+  - babinski     R downgoing     L downgoing  - Wheatley     R absent / present     L absent / present  Gait: Narrow gait and steady, Romberg sign negative / Deferred for patient safety    NIHSS: 0    mRS at discharge: 1    New medications on discharge:  Labs to be followed up:  Imaging to be done as outpatient:  Further outpatient workup:   Hospital course:  64F. PMH: Hx of Cholecystitis s/p lap-roel, former smoker (13 pack years, quit 14 years ago) presented 11/9 c/o dizziness nausea and vomiting since waking up from sleep. NIHSS 0. CTH (+) for Acute infarct involving the left cerebellar hemisphere, CTP showed 5cc of penumbra in L hemisphere, with 4cc of core infarct. 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 No TNK OOW, No MT - due to low NIHSS. MRI showing large cerebellar infarct with partial effacement of 4th ventricle and mass effect on the medulla and left cerebellar tonsillar herniation. Upgraded to NICU 11/10 to start hypertonic saline, NIH at this time 1 for LUE dysmetria. Downgraded to stroke unit 11/7 once stable. Suspect L cerebellar stroke in L vertebral artery distribution secondary to SHEFALI vs ESUS.    Patient had the following workup done in house:  CT HEAD: 11/9/24  Acute infarct involving the left cerebellarhemisphere without evidence   for hemorrhage.    CTA HEAD/NECK: 11/9/24  Occluded left vertebral artery.    MR Head Non Contrast: 11/14/24  Interval development of patchy petechial hemorrhagic transformation in   the left inferior cerebellar infarct since the prior CT head from   11/13/2024. Persistent mass effect on the medulla and right cerebellum.    Improved patency of the previously occluded left vertebral artery (likely   in the V4 segment) with mild residual stenosis in the distal V4 segment.   No evidence of intramural hematoma to suggest dissection.    Physical exam at discharge:  Mental status: AOx3  Language: Speech with intact fluency , naming , repetition , comprehension, absent dysarthria  Memory: Recent and remote memory intact. Follows commands. Attention/concentration intact. Fund of knowledge appropriate  Cranial nerves:   II: Visual fields are full to confrontation  III, IV, VI: Extraocular movements intact, no noticeable nystagmus. Pupils equally round and reactive to light  V:  Facial sensation V1-V3 equal and intact   VII: Face is symmetric with normal eye closure and smile  *****VIII: hearing reduced R side  IX, X: Uvula is midline and soft palate rises symmetrically, gag reflex intact  XI: Head turning and shoulder shrug are intact  XII: Tongue protrudes midline  Motor: Normal bulk and tone. No pronator drift.   - shoulder abduction     R 5/5     L 5/5  - elbow flexion     R 5/5     L 5/5  - elbow extension     R 5/5     L 5/5  - hip flexion     R 5/5     L 5/5  - knee flexion     R 5/5     L 5/5  - knee extension     R 5/5     L 5/5  - dorsiflexion     R 5/5     L 5/5  - plantarflexion     R 5/5     L 5/5  Sensation: Intact to light touch bilaterally. No neglect or extinction on double simultaneous testing.  Coordination: No dysmetria on finger-to-nose and heel-to-shin bilaterally, rapid alternating movements intact and symmetric  Reflexes:   - biceps     R 2+     L 2+  - triceps     R 2+     L 2+  - brachioradialis     R 2+     L 2+  - patellar     R 2+     L 2+  - achillles     R 2+     L 2+  - babinski     R downgoing     L downgoing  Gait: Deferred for patient safety    NIHSS: 0    mRS at discharge: 3    New medications on discharge: aspirin 81mg daily, clopidogrel 75mg daily, meclizine 12.5mg q6h  Labs to be followed up: n/a  Imaging to be done as outpatient: n/a  Further outpatient workup: n/a   Hospital course:  64F. PMH: Hx of Cholecystitis s/p lap-roel, former smoker (13 pack years, quit 14 years ago) presented 11/9 c/o dizziness nausea and vomiting since waking up from sleep. NIHSS 0. CTH (+) for Acute infarct involving the left cerebellar hemisphere, CTP showed 5cc of penumbra in L hemisphere, with 4cc of core infarct. 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 No TNK OOW, No MT - due to low NIHSS. MRI showing large cerebellar infarct with partial effacement of 4th ventricle and mass effect on the medulla and left cerebellar tonsillar herniation. Upgraded to NICU 11/10 to start hypertonic saline, NIH at this time 1 for LUE dysmetria. Downgraded to stroke unit 11/7 once stable. Suspect L cerebellar stroke in L vertebral artery distribution secondary to SHEFALI vs ESUS.    Patient had the following workup done in house:  CT HEAD: 11/9/24  Acute infarct involving the left cerebellarhemisphere without evidence   for hemorrhage.    CTA HEAD/NECK: 11/9/24  Occluded left vertebral artery.    MR Head Non Contrast: 11/14/24  Interval development of patchy petechial hemorrhagic transformation in   the left inferior cerebellar infarct since the prior CT head from   11/13/2024. Persistent mass effect on the medulla and right cerebellum.    Improved patency of the previously occluded left vertebral artery (likely   in the V4 segment) with mild residual stenosis in the distal V4 segment.   No evidence of intramural hematoma to suggest dissection.    Physical exam at discharge:  Mental status: AOx3  Language: Speech with intact fluency , naming , repetition , comprehension, absent dysarthria  Memory: Recent and remote memory intact. Follows commands. Attention/concentration intact. Fund of knowledge appropriate  Cranial nerves:   II: Visual fields are full to confrontation  III, IV, VI: Extraocular movements intact, no noticeable nystagmus. Pupils equally round and reactive to light  V:  Facial sensation V1-V3 equal and intact   VII: Face is symmetric with normal eye closure and smile  *****VIII: hearing reduced R side  IX, X: Uvula is midline and soft palate rises symmetrically, gag reflex intact  XI: Head turning and shoulder shrug are intact  XII: Tongue protrudes midline  Motor: Normal bulk and tone. No pronator drift.   - shoulder abduction     R 5/5     L 5/5  - elbow flexion     R 5/5     L 5/5  - elbow extension     R 5/5     L 5/5  - hip flexion     R 5/5     L 5/5  - knee flexion     R 5/5     L 5/5  - knee extension     R 5/5     L 5/5  - dorsiflexion     R 5/5     L 5/5  - plantarflexion     R 5/5     L 5/5  Sensation: Intact to light touch bilaterally. No neglect or extinction on double simultaneous testing.  Coordination: No dysmetria on finger-to-nose and heel-to-shin bilaterally, rapid alternating movements intact and symmetric  Reflexes:   - biceps     R 2+     L 2+  - triceps     R 2+     L 2+  - brachioradialis     R 2+     L 2+  - patellar     R 2+     L 2+  - achillles     R 2+     L 2+  - babinski     R downgoing     L downgoing  Gait: Deferred for patient safety    NIHSS: 0    mRS at discharge: 3    New medications on discharge: aspirin 81mg daily, clopidogrel 75mg daily, meclizine 12.5mg q6h, famotidine 20mg twice daily, atorvastatin 40mg nightly, senna 2 tablets nightly prn 2 tablet nightly  Labs to be followed up: n/a  Imaging to be done as outpatient: n/a  Further outpatient workup: neurology follow up, EP follow up   Hospital course:  64F. PMH: Hx of Cholecystitis s/p lap-roel, former smoker (13 pack years, quit 14 years ago) presented 11/9 c/o dizziness nausea and vomiting since waking up from sleep. NIHSS 0. CTH (+) for Acute infarct involving the left cerebellar hemisphere, CTP showed 5cc of penumbra in L hemisphere, with 4cc of core infarct. 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 No TNK OOW, No MT - due to low NIHSS. MRI showing large cerebellar infarct with partial effacement of 4th ventricle and mass effect on the medulla and left cerebellar tonsillar herniation. Upgraded to NICU 11/10 to start hypertonic saline, NIH at this time 1 for LUE dysmetria. Downgraded to stroke unit 11/7 once stable. Suspect L cerebellar stroke in L vertebral artery distribution secondary to SHEFALI vs ESUS.    Patient had the following workup done in house:  CT HEAD: 11/9/24  Acute infarct involving the left cerebellarhemisphere without evidence   for hemorrhage.    CTA HEAD/NECK: 11/9/24  Occluded left vertebral artery.    MR Head Non Contrast: 11/14/24  Interval development of patchy petechial hemorrhagic transformation in   the left inferior cerebellar infarct since the prior CT head from   11/13/2024. Persistent mass effect on the medulla and right cerebellum.    Improved patency of the previously occluded left vertebral artery (likely   in the V4 segment) with mild residual stenosis in the distal V4 segment.   No evidence of intramural hematoma to suggest dissection.    Physical exam at discharge:  Mental status: AOx3  Language: Speech with intact fluency , naming , repetition , comprehension, absent dysarthria  Memory: Recent and remote memory intact. Follows commands. Attention/concentration intact. Fund of knowledge appropriate  Cranial nerves:   II: Visual fields are full to confrontation  III, IV, VI: Extraocular movements intact, no noticeable nystagmus. Pupils equally round and reactive to light  V:  Facial sensation V1-V3 equal and intact   VII: Face is symmetric with normal eye closure and smile  *****VIII: hearing reduced R side  IX, X: Uvula is midline and soft palate rises symmetrically, gag reflex intact  XI: Head turning and shoulder shrug are intact  XII: Tongue protrudes midline  Motor: Normal bulk and tone. No pronator drift.   - shoulder abduction     R 5/5     L 5/5  - elbow flexion     R 5/5     L 5/5  - elbow extension     R 5/5     L 5/5  - hip flexion     R 5/5     L 5/5  - knee flexion     R 5/5     L 5/5  - knee extension     R 5/5     L 5/5  - dorsiflexion     R 5/5     L 5/5  - plantarflexion     R 5/5     L 5/5  Sensation: Intact to light touch bilaterally. No neglect or extinction on double simultaneous testing.  Coordination: No dysmetria on finger-to-nose and heel-to-shin bilaterally, rapid alternating movements intact and symmetric  Reflexes:   - biceps     R 2+     L 2+  - triceps     R 2+     L 2+  - brachioradialis     R 2+     L 2+  - patellar     R 2+     L 2+  - achillles     R 2+     L 2+  - babinski     R downgoing     L downgoing  Gait: Deferred for patient safety    NIHSS: 0    mRS at discharge: 3    New medications on discharge: aspirin 81mg daily, clopidogrel 75mg daily (until 11/30), meclizine 12.5mg q6h, famotidine 20mg twice daily, atorvastatin 40mg nightly, senna 2 tablets nightly prn 2 tablet nightly  Labs to be followed up: n/a  Imaging to be done as outpatient: n/a  Further outpatient workup: neurology follow up, EP follow up

## 2024-11-20 NOTE — DISCHARGE NOTE PROVIDER - NSDCCPCAREPLAN_GEN_ALL_CORE_FT
PRINCIPAL DISCHARGE DIAGNOSIS  Diagnosis: Stroke  Assessment and Plan of Treatment: You were admitted for evaluation and management of: stroke  You were evaluated with: CT, MRI  You were treated with: blood pressure control, sodium control, physical therapy  On discharge please do the following:   - take all medications as prescribed  - follow up with your primary care provider  - follow up in stroke clinic  Please return to the ED if you feel unwell, or if you experience worsening symptoms, or any of the following symptoms but not limited to: chest pain, palpitations, shortness of breath, falls, confusion, seizures, sudden balance problems, double vision, vision problems, facial droop, arm or leg weakness, slurring, bleeding, severe pain      SECONDARY DISCHARGE DIAGNOSES  Diagnosis: Nausea and vomiting  Assessment and Plan of Treatment:      PRINCIPAL DISCHARGE DIAGNOSIS  Diagnosis: Stroke  Assessment and Plan of Treatment: You were admitted for evaluation and management of: stroke  You were evaluated with: CT, MRI  You were treated with: blood pressure control, sodium control, physical therapy  On discharge please do the following:   - take all medications as prescribed  - follow up with your primary care provider  - follow up in stroke clinic  - follow up with cardiology regarding your loop recorder  Please return to the ED if you feel unwell, or if you experience worsening symptoms, or any of the following symptoms but not limited to: chest pain, palpitations, shortness of breath, falls, confusion, seizures, sudden balance problems, double vision, vision problems, facial droop, arm or leg weakness, slurring, bleeding, severe pain      SECONDARY DISCHARGE DIAGNOSES  Diagnosis: Nausea and vomiting  Assessment and Plan of Treatment:      PRINCIPAL DISCHARGE DIAGNOSIS  Diagnosis: Stroke  Assessment and Plan of Treatment: You were admitted for evaluation and management of: cerebellar stroke with an occluded left vertebral artery possibly due to clot from the occluded artery vs from the heart; you got a loop recorder to evaluate heart rhythms from the heart  You were evaluated with: CT, MRI, telemetry, loop recorder  You were treated with: blood pressure control, sodium control, physical therapy  On discharge please do the following:   - take all medications as prescribed especially aspirin plus plavix until 11/30 and then aspirin alone afterward  - follow up with your primary care provider  - follow up in stroke clinic  - follow up with cardiology regarding your loop recorder  Please return to the ED if you feel unwell, or if you experience worsening symptoms, or any of the following symptoms but not limited to: chest pain, palpitations, shortness of breath, falls, confusion, seizures, sudden balance problems, double vision, vision problems, facial droop, arm or leg weakness, slurring, bleeding, severe pain      SECONDARY DISCHARGE DIAGNOSES  Diagnosis: Nausea and vomiting  Assessment and Plan of Treatment:      PRINCIPAL DISCHARGE DIAGNOSIS  Diagnosis: Stroke  Assessment and Plan of Treatment: You were admitted for evaluation and management of: cerebellar stroke with an occluded left vertebral artery possibly due to clot from the occluded artery vs from the heart; you got a loop recorder to evaluate heart rhythms from the heart  You were evaluated with: CT, MRI, telemetry, loop recorder  You were treated with: blood pressure control, sodium control, physical therapy  On discharge please do the following:   - take all medications as prescribed especially aspirin plus plavix until 11/30 and then aspirin alone afterward  - follow up with your primary care provider Dr Rueda  - follow up in stroke clinic Dr Galo  - follow up with cardiology Dr Leavitt regarding your loop recorder  - call [phone] to schedule your session at the rehab gym in the [facility] (same building Dr Rueda is in)  Please return to the ED if you feel unwell, or if you experience worsening symptoms, or any of the following symptoms but not limited to: chest pain, palpitations, shortness of breath, falls, confusion, seizures, sudden balance problems, double vision, vision problems, facial droop, arm or leg weakness, slurring, bleeding, severe pain      SECONDARY DISCHARGE DIAGNOSES  Diagnosis: Nausea and vomiting  Assessment and Plan of Treatment:

## 2024-11-20 NOTE — DISCHARGE NOTE PROVIDER - CARE PROVIDER_API CALL
Jaymie Galo  Neurology  [address] [place] [address]  Phone: [phone]  Fax: [phone]  Follow Up Time: 2 weeks    Blanka Rueda  Internal Medicine  [address] [place] [address]  Phone: [phone]  Fax: [phone]  Follow Up Time: 2 weeks   Jaymie Galo  Neurology  [address] [place] [address]  Phone: [phone]  Fax: [phone]  Follow Up Time: 2 weeks    Blanka Rueda  Internal Medicine  [address] [place] [address]  Phone: [phone]  Fax: [phone]  Follow Up Time: 2 weeks    Farhat Leavitt  Cardiac Electrophysiology  [address] [place] [address], [facility] [address]  [place] [address]  Phone: [phone]  Fax: [phone]  Follow Up Time: 1 month

## 2024-11-20 NOTE — PROGRESS NOTE ADULT - SUBJECTIVE AND OBJECTIVE BOX
Neurology Progress Note    Interval History:     Medications:  acetaminophen     Tablet .. 650 milliGRAM(s) Oral every 6 hours PRN  aspirin  chewable 81 milliGRAM(s) Oral daily  atorvastatin 40 milliGRAM(s) Oral at bedtime  chlorhexidine 2% Cloths 1 Application(s) Topical <User Schedule>  clopidogrel Tablet 75 milliGRAM(s) Oral daily  enoxaparin Injectable 40 milliGRAM(s) SubCutaneous every 24 hours  famotidine    Tablet 20 milliGRAM(s) Oral two times a day  ondansetron Injectable 4 milliGRAM(s) IV Push three times a day PRN  polyethylene glycol 3350 17 Gram(s) Oral every 12 hours  scopolamine 1 mG/72 Hr(s) Patch 1 Patch Transdermal every 72 hours  senna 2 Tablet(s) Oral at bedtime    Vital Signs Last 24 Hrs  T(C): 37.1 (20 Nov 2024 04:56), Max: 37.1 (20 Nov 2024 04:56)  T(F): 98.7 (20 Nov 2024 04:56), Max: 98.7 (20 Nov 2024 04:56)  HR: 68 (20 Nov 2024 04:56) (68 - 76)  BP: 117/70 (20 Nov 2024 04:56) (104/64 - 117/70)  BP(mean): 81 (19 Nov 2024 20:49) (77 - 81)  RR: 18 (20 Nov 2024 04:56) (16 - 18)  SpO2: 95% (20 Nov 2024 04:56) (95% - 97%)    Parameters below as of 20 Nov 2024 04:56  Patient On (Oxygen Delivery Method): room air      Neurologic Physical Exam  Mental status: AOx  Language: Speech with intact fluency , naming , repetition , comprehension, absent dysarthria  Memory: Recent and remote memory intact. Follows commands. Attention/concentration intact. Fund of knowledge appropriate  Cranial nerves:   II: Visual fields are full to confrontation  III, IV, VI: Extraocular movements intact, no noticeable nystagmus. Pupils equally round and reactive to light  V:  Facial sensation V1-V3 equal and intact   VII: Face is symmetric with normal eye closure and smile  VIII: Hearing is intact bilaterally intact  IX, X: Uvula is midline and soft palate rises symmetrically, gag reflex intact  XI: Head turning and shoulder shrug are intact  XII: Tongue protrudes midline  Motor: Normal bulk and tone. No pronator drift.   - shoulder abduction     R 5/5     L 5/5  - elbow flexion     R 5/5     L 5/5  - elbow extension     R 5/5     L 5/5  - wrist flexion     R 5/5     L 5/5  - wrist extension     R 5/5     L 5/5  - finger flexion     R 5/5     L 5/5  - finger extension     R 5/5     L 5/5  - finger abduction     R 5/5     L 5/5  - hip flexion     R 5/5     L 5/5  - knee flexion     R 5/5     L 5/5  - knee extension     R 5/5     L 5/5  - dorsiflexion     R 5/5     L 5/5  - plantarflexion     R 5/5     L 5/5  - pronator drift     absent / present L / R  Sensation: Intact to light touch bilaterally. No neglect or extinction on double simultaneous testing.  Coordination: No dysmetria on finger-to-nose and heel-to-shin bilaterally, rapid alternating movements intact and symmetric  Reflexes:   - biceps     R 2+     L 2+  - triceps     R 2+     L 2+  - brachioradialis     R 2+     L 2+  - patellar     R 2+     L 2+  - Achillles     R 2+     L 2+  - Babinski     R downgoing / upgoing     L downgoing / upgoing  - Wheatley     R absent / present     L absent / present  Gait: Narrow gait and steady, Romberg sign negative / Deferred for patient safety    NIHSS:     Labs:  CBC Full  -  ( 19 Nov 2024 05:31 )  WBC Count : 6.92 K/uL  RBC Count : 4.51 M/uL  Hemoglobin : 12.6 g/dL  Hematocrit : 37.9 %  Platelet Count - Automated : 247 K/uL  Mean Cell Volume : 84.0 fL  Mean Cell Hemoglobin : 27.9 pg  Mean Cell Hemoglobin Concentration : 33.2 g/dL  Auto Neutrophil # : 3.98 K/uL  Auto Lymphocyte # : 2.19 K/uL  Auto Monocyte # : 0.45 K/uL  Auto Eosinophil # : 0.20 K/uL  Auto Basophil # : 0.05 K/uL  Auto Neutrophil % : 57.6 %  Auto Lymphocyte % : 31.6 %  Auto Monocyte % : 6.5 %  Auto Eosinophil % : 2.9 %  Auto Basophil % : 0.7 %    11-19    141  |  105  |  10  ----------------------------<  104[H]  4.0   |  27  |  0.6[L]    Ca    8.7      19 Nov 2024 05:31  Phos  4.3     11-19  Mg     2.3     11-19    TPro  6.3  /  Alb  3.8  /  TBili  0.4  /  DBili  x   /  AST  39  /  ALT  58[H]  /  AlkPhos  105  11-19    LIVER FUNCTIONS - ( 19 Nov 2024 05:31 )  Alb: 3.8 g/dL / Pro: 6.3 g/dL / ALK PHOS: 105 U/L / ALT: 58 U/L / AST: 39 U/L / GGT: x             Urinalysis Basic - ( 19 Nov 2024 05:31 )    Color: x / Appearance: x / SG: x / pH: x  Gluc: 104 mg/dL / Ketone: x  / Bili: x / Urobili: x   Blood: x / Protein: x / Nitrite: x   Leuk Esterase: x / RBC: x / WBC x   Sq Epi: x / Non Sq Epi: x / Bacteria: x        Pertinent stroke studies/labwork reviewed:    [] CTH -   [] CTA head/Neck -   [] CTP -   [] MRI brain noncontrast -   [] TTE - EF [%] PFO [absent / present] Significant valvular disease [absent / present]  [] GREGORIO - EF [%] PFO [absent / present] Significant valvular disease [absent / present]    CORE MEASURES   [] HbA1c  [] LDL  [] TSH    Pertinent other studies/labwork reviewed:   Neurology Progress Note    Interval History: eating, sleeping, stooling, voiding, ambulating appropriately     Medications:  acetaminophen     Tablet .. 650 milliGRAM(s) Oral every 6 hours PRN  aspirin  chewable 81 milliGRAM(s) Oral daily  atorvastatin 40 milliGRAM(s) Oral at bedtime  chlorhexidine 2% Cloths 1 Application(s) Topical <User Schedule>  clopidogrel Tablet 75 milliGRAM(s) Oral daily  enoxaparin Injectable 40 milliGRAM(s) SubCutaneous every 24 hours  famotidine    Tablet 20 milliGRAM(s) Oral two times a day  ondansetron Injectable 4 milliGRAM(s) IV Push three times a day PRN  polyethylene glycol 3350 17 Gram(s) Oral every 12 hours  scopolamine 1 mG/72 Hr(s) Patch 1 Patch Transdermal every 72 hours  senna 2 Tablet(s) Oral at bedtime    Vital Signs Last 24 Hrs  T(C): 37.1 (20 Nov 2024 04:56), Max: 37.1 (20 Nov 2024 04:56)  T(F): 98.7 (20 Nov 2024 04:56), Max: 98.7 (20 Nov 2024 04:56)  HR: 68 (20 Nov 2024 04:56) (68 - 76)  BP: 117/70 (20 Nov 2024 04:56) (104/64 - 117/70)  BP(mean): 81 (19 Nov 2024 20:49) (77 - 81)  RR: 18 (20 Nov 2024 04:56) (16 - 18)  SpO2: 95% (20 Nov 2024 04:56) (95% - 97%)    Parameters below as of 20 Nov 2024 04:56  Patient On (Oxygen Delivery Method): room air      Neurologic Physical Exam  Mental status: AOx3  Language: Speech with intact fluency , naming , repetition , comprehension, absent dysarthria  Memory: Recent and remote memory intact. Follows commands. Attention/concentration intact. Fund of knowledge appropriate  Cranial nerves:   II: Visual fields are full to confrontation  III, IV, VI: Extraocular movements intact, no noticeable nystagmus. Pupils equally round and reactive to light  V:  Facial sensation V1-V3 equal and intact   VII: Face is symmetric with normal eye closure and smile  VIII: Hearing is intact bilaterally intact  IX, X: Uvula is midline and soft palate rises symmetrically, gag reflex intact  XI: Head turning and shoulder shrug are intact  XII: Tongue protrudes midline  Motor: Normal bulk and tone. No pronator drift.   - shoulder abduction     R 5/5     L 5/5  - elbow flexion     R 5/5     L 5/5  - elbow extension     R 5/5     L 5/5  - hip flexion     R 5/5     L 5/5  - knee flexion     R 5/5     L 5/5  - knee extension     R 5/5     L 5/5  - dorsiflexion     R 5/5     L 5/5  - plantarflexion     R 5/5     L 5/5  Sensation: Intact to light touch bilaterally. No neglect or extinction on double simultaneous testing.  Coordination: No dysmetria on finger-to-nose and heel-to-shin bilaterally, rapid alternating movements intact and symmetric  Reflexes:   - biceps     R 2+     L 2+  - triceps     R 2+     L 2+  - brachioradialis     R 2+     L 2+  - patellar     R 2+     L 2+  - achillles     R 2+     L 2+  - babinski     R downgoing     L downgoing  - Wheatley     R absent / present     L absent / present  Gait: Narrow gait and steady, Romberg sign negative / Deferred for patient safety    NIHSS: 0    Labs:  CBC Full  -  ( 19 Nov 2024 05:31 )  WBC Count : 6.92 K/uL  RBC Count : 4.51 M/uL  Hemoglobin : 12.6 g/dL  Hematocrit : 37.9 %  Platelet Count - Automated : 247 K/uL  Mean Cell Volume : 84.0 fL  Mean Cell Hemoglobin : 27.9 pg  Mean Cell Hemoglobin Concentration : 33.2 g/dL  Auto Neutrophil # : 3.98 K/uL  Auto Lymphocyte # : 2.19 K/uL  Auto Monocyte # : 0.45 K/uL  Auto Eosinophil # : 0.20 K/uL  Auto Basophil # : 0.05 K/uL  Auto Neutrophil % : 57.6 %  Auto Lymphocyte % : 31.6 %  Auto Monocyte % : 6.5 %  Auto Eosinophil % : 2.9 %  Auto Basophil % : 0.7 %    11-19    141  |  105  |  10  ----------------------------<  104[H]  4.0   |  27  |  0.6[L]    Ca    8.7      19 Nov 2024 05:31  Phos  4.3     11-19  Mg     2.3     11-19    TPro  6.3  /  Alb  3.8  /  TBili  0.4  /  DBili  x   /  AST  39  /  ALT  58[H]  /  AlkPhos  105  11-19    LIVER FUNCTIONS - ( 19 Nov 2024 05:31 )  Alb: 3.8 g/dL / Pro: 6.3 g/dL / ALK PHOS: 105 U/L / ALT: 58 U/L / AST: 39 U/L / GGT: x             Urinalysis Basic - ( 19 Nov 2024 05:31 )    Color: x / Appearance: x / SG: x / pH: x  Gluc: 104 mg/dL / Ketone: x  / Bili: x / Urobili: x   Blood: x / Protein: x / Nitrite: x   Leuk Esterase: x / RBC: x / WBC x   Sq Epi: x / Non Sq Epi: x / Bacteria: x

## 2024-11-20 NOTE — PROGRESS NOTE ADULT - ASSESSMENT
64F. PMH: Hx of Cholecystitis s/p lap-roel, former smoker (13 pack years, quit 14 years ago) presented 11/9 c/o dizziness nausea and vomiting since waking up from sleep. NIHSS 0. CTH (+) for Acute infarct involving the left cerebellar hemisphere, CTP showed 5cc of penumbra in L hemisphere, with 4cc of core infarct. 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 No TNK OOW, No MT - due to low NIHSS. MRI showing large cerebellar infarct with partial effacement of 4th ventricle and mass effect on the medulla and left cerebellar tonsillar herniation. Upgraded to NICU 11/10 to start hypertonic saline, NIH at this time 1 for LUE dysmetria. Downgraded to stroke unit 11/7 once stable. Suspect L cerebellar stroke in L vertebral artery distribution secondary to SHEFALI vs ESUS.    NEURO  #stroke  WORKUP/PENDING  - ILR   PLAN  - antiplatelets/anticoagulants: aspirin 81mg daily , clopidogrel 75mg daily   - antilipemics: atorvastatin 40mg daily   - SBP goal:   - antihypertensives:  - q4h neuro and vitals checks    CARDIOVASCULAR  - SBP goal above    PULM  - call provider if SPO2 < 94%  - HOB >30 degree  - aspiration precautions    GI  - Diet: DASH/TLC  - GI prophylaxis: famotidine 20mg twice daily     #transaminitis - improving    #nausea  - scopolamine patch       - monitor and replete electrolytes  - monitor BUN/Cr  - NaCl > discontinued  - Na goal 140- 150 for cerebral edema     ID  - monitor for temperature <36C or >38C  - MRSA prophylaxis: not indicated at this time / chlorhexidine 2% cloths    ENDOCRINE  - monitor blood glucose/fingerstick, allowed to be up to 140-180    BEHAVIORAL  - delirium precautions    HEME/ONC  #R peroneal DVT    #normocytic anemia - monitor    #DVT prophylaxis: lovenox / SCDs    --------------------------------------------------  #Activity: ambulate as tolerated  #Code status:   #disposition:   - PT advises discharge to: acute rehab  - OT advises discharge to: acute rehab  - physiatry advises discharge to:   - patient planning to discharge to:  --------------------------------------------------  To do after discharge:  -   --------------------------------------------------  To do during admission:  Kaitlynn son 64F. PMH: Hx of Cholecystitis s/p lap-roel, former smoker (13 pack years, quit 14 years ago) presented 11/9 c/o dizziness nausea and vomiting since waking up from sleep. NIHSS 0. CTH (+) for Acute infarct involving the left cerebellar hemisphere, CTP showed 5cc of penumbra in L hemisphere, with 4cc of core infarct. 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 No TNK OOW, No MT - due to low NIHSS. MRI showing large cerebellar infarct with partial effacement of 4th ventricle and mass effect on the medulla and left cerebellar tonsillar herniation. Upgraded to NICU 11/10 to start hypertonic saline, NIH at this time 1 for LUE dysmetria. Downgraded to stroke unit 11/7 once stable. Suspect L cerebellar stroke in L vertebral artery distribution secondary to SHEFALI vs ESUS.    NEURO  #stroke  WORKUP/PENDING  - ILR   PLAN  - antiplatelets/anticoagulants: aspirin 81mg daily , clopidogrel 75mg daily   - antilipemics: atorvastatin 40mg daily   - SBP goal: 110-150  - antihypertensives:  - q4h neuro and vitals checks    CARDIOVASCULAR  - SBP goal above    PULM  - call provider if SPO2 < 94%  - HOB >30 degree  - aspiration precautions    GI  - Diet: DASH/TLC  - GI prophylaxis: famotidine 20mg twice daily     #transaminitis - improving    #nausea  - scopolamine patch       - monitor and replete electrolytes  - monitor BUN/Cr  - s/p NaCl tabs  - Na goal 140- 150 for cerebral edema     ID  - monitor for temperature <36C or >38C  - MRSA prophylaxis: not indicated at this time / chlorhexidine 2% cloths    ENDOCRINE  - monitor blood glucose/fingerstick, allowed to be up to 140-180    BEHAVIORAL  - delirium precautions    HEME/ONC  #R peroneal DVT    #normocytic anemia - monitor    #DVT prophylaxis: lovenox / SCDs    --------------------------------------------------  #Activity: ambulate as tolerated  #Code status:   #disposition:   - PT advises discharge to: acute rehab  - OT advises discharge to: acute rehab  - physiatry advises discharge to: 4a  - patient planning to discharge to: 4A

## 2024-11-20 NOTE — DISCHARGE NOTE PROVIDER - NSDCQMMRS_NEU_ALL_CORE
1 - No significant disability. Able to carry out all usual activities, despite some symptoms 3 - Moderate disability. Requires some help, but able to walk unassisted.

## 2024-11-20 NOTE — DISCHARGE NOTE PROVIDER - PROVIDER TOKENS
PROVIDER:[TOKEN:[[id number]],FOLLOWUP:[2 weeks]],PROVIDER:[TOKEN:[[id number]],FOLLOWUP:[2 weeks]] PROVIDER:[TOKEN:[[id number]],FOLLOWUP:[2 weeks]],PROVIDER:[TOKEN:[[id number]],FOLLOWUP:[2 weeks]],PROVIDER:[TOKEN:[[id number]],FOLLOWUP:[1 month]]

## 2024-11-20 NOTE — DISCHARGE NOTE PROVIDER - CARE PROVIDERS DIRECT ADDRESSES
[email or web address]

## 2024-11-21 ENCOUNTER — TRANSCRIPTION ENCOUNTER (OUTPATIENT)
Age: 64
End: 2024-11-21

## 2024-11-21 VITALS
SYSTOLIC BLOOD PRESSURE: 107 MMHG | DIASTOLIC BLOOD PRESSURE: 65 MMHG | TEMPERATURE: 98 F | HEART RATE: 77 BPM | RESPIRATION RATE: 20 BRPM | OXYGEN SATURATION: 98 %

## 2024-11-21 LAB
ANION GAP SERPL CALC-SCNC: 12 MMOL/L — SIGNIFICANT CHANGE UP (ref 7–14)
BASOPHILS # BLD AUTO: 0.04 K/UL — SIGNIFICANT CHANGE UP (ref 0–0.2)
BASOPHILS NFR BLD AUTO: 0.6 % — SIGNIFICANT CHANGE UP (ref 0–1)
BUN SERPL-MCNC: 12 MG/DL — SIGNIFICANT CHANGE UP (ref 10–20)
CALCIUM SERPL-MCNC: 9.1 MG/DL — SIGNIFICANT CHANGE UP (ref 8.4–10.5)
CHLORIDE SERPL-SCNC: 103 MMOL/L — SIGNIFICANT CHANGE UP (ref 98–110)
CO2 SERPL-SCNC: 24 MMOL/L — SIGNIFICANT CHANGE UP (ref 17–32)
CREAT SERPL-MCNC: 0.7 MG/DL — SIGNIFICANT CHANGE UP (ref 0.7–1.5)
EGFR: 96 ML/MIN/1.73M2 — SIGNIFICANT CHANGE UP
EOSINOPHIL # BLD AUTO: 0.14 K/UL — SIGNIFICANT CHANGE UP (ref 0–0.7)
EOSINOPHIL NFR BLD AUTO: 2.3 % — SIGNIFICANT CHANGE UP (ref 0–8)
GLUCOSE SERPL-MCNC: 98 MG/DL — SIGNIFICANT CHANGE UP (ref 70–99)
HCT VFR BLD CALC: 38.6 % — SIGNIFICANT CHANGE UP (ref 37–47)
HGB BLD-MCNC: 12.8 G/DL — SIGNIFICANT CHANGE UP (ref 12–16)
IMM GRANULOCYTES NFR BLD AUTO: 0.2 % — SIGNIFICANT CHANGE UP (ref 0.1–0.3)
LYMPHOCYTES # BLD AUTO: 1.74 K/UL — SIGNIFICANT CHANGE UP (ref 1.2–3.4)
LYMPHOCYTES # BLD AUTO: 28.1 % — SIGNIFICANT CHANGE UP (ref 20.5–51.1)
MAGNESIUM SERPL-MCNC: 2.3 MG/DL — SIGNIFICANT CHANGE UP (ref 1.8–2.4)
MCHC RBC-ENTMCNC: 27.7 PG — SIGNIFICANT CHANGE UP (ref 27–31)
MCHC RBC-ENTMCNC: 33.2 G/DL — SIGNIFICANT CHANGE UP (ref 32–37)
MCV RBC AUTO: 83.5 FL — SIGNIFICANT CHANGE UP (ref 81–99)
MONOCYTES # BLD AUTO: 0.49 K/UL — SIGNIFICANT CHANGE UP (ref 0.1–0.6)
MONOCYTES NFR BLD AUTO: 7.9 % — SIGNIFICANT CHANGE UP (ref 1.7–9.3)
NEUTROPHILS # BLD AUTO: 3.77 K/UL — SIGNIFICANT CHANGE UP (ref 1.4–6.5)
NEUTROPHILS NFR BLD AUTO: 60.9 % — SIGNIFICANT CHANGE UP (ref 42.2–75.2)
NRBC # BLD: 0 /100 WBCS — SIGNIFICANT CHANGE UP (ref 0–0)
PHOSPHATE SERPL-MCNC: 4.6 MG/DL — SIGNIFICANT CHANGE UP (ref 2.1–4.9)
PLATELET # BLD AUTO: 257 K/UL — SIGNIFICANT CHANGE UP (ref 130–400)
PMV BLD: 10.3 FL — SIGNIFICANT CHANGE UP (ref 7.4–10.4)
POTASSIUM SERPL-MCNC: 4.2 MMOL/L — SIGNIFICANT CHANGE UP (ref 3.5–5)
POTASSIUM SERPL-SCNC: 4.2 MMOL/L — SIGNIFICANT CHANGE UP (ref 3.5–5)
RBC # BLD: 4.62 M/UL — SIGNIFICANT CHANGE UP (ref 4.2–5.4)
RBC # FLD: 13 % — SIGNIFICANT CHANGE UP (ref 11.5–14.5)
SODIUM SERPL-SCNC: 139 MMOL/L — SIGNIFICANT CHANGE UP (ref 135–146)
WBC # BLD: 6.19 K/UL — SIGNIFICANT CHANGE UP (ref 4.8–10.8)
WBC # FLD AUTO: 6.19 K/UL — SIGNIFICANT CHANGE UP (ref 4.8–10.8)

## 2024-11-21 PROCEDURE — 99239 HOSP IP/OBS DSCHRG MGMT >30: CPT | Mod: GC

## 2024-11-21 PROCEDURE — 33285 INSJ SUBQ CAR RHYTHM MNTR: CPT

## 2024-11-21 RX ORDER — MECLIZINE HCL 12.5 MG
12.5 TABLET ORAL EVERY 6 HOURS
Refills: 0 | Status: DISCONTINUED | OUTPATIENT
Start: 2024-11-21 | End: 2024-11-21

## 2024-11-21 RX ORDER — SENNOSIDES 8.6 MG
2 TABLET ORAL
Qty: 60 | Refills: 0
Start: 2024-11-21 | End: 2024-12-20

## 2024-11-21 RX ORDER — FAMOTIDINE 20 MG/1
1 TABLET, FILM COATED ORAL
Qty: 60 | Refills: 0
Start: 2024-11-21 | End: 2024-12-20

## 2024-11-21 RX ORDER — MECLIZINE HCL 12.5 MG
1 TABLET ORAL
Qty: 60 | Refills: 0
Start: 2024-11-21 | End: 2024-12-20

## 2024-11-21 RX ORDER — POLYETHYLENE GLYCOL 3350 17 G/17G
17 POWDER, FOR SOLUTION ORAL
Qty: 1020 | Refills: 0
Start: 2024-11-21 | End: 2024-12-20

## 2024-11-21 RX ADMIN — FAMOTIDINE 20 MILLIGRAM(S): 20 TABLET, FILM COATED ORAL at 05:11

## 2024-11-21 RX ADMIN — CHLORHEXIDINE GLUCONATE 1 APPLICATION(S): 1.2 RINSE ORAL at 05:19

## 2024-11-21 RX ADMIN — ENOXAPARIN SODIUM 40 MILLIGRAM(S): 30 INJECTION SUBCUTANEOUS at 05:11

## 2024-11-21 RX ADMIN — Medication 81 MILLIGRAM(S): at 11:21

## 2024-11-21 RX ADMIN — Medication 1 TABLET(S): at 15:16

## 2024-11-21 RX ADMIN — SCOPOLAMINE 1 PATCH: 1 PATCH, EXTENDED RELEASE TRANSDERMAL at 12:10

## 2024-11-21 RX ADMIN — SCOPOLAMINE 1 PATCH: 1 PATCH, EXTENDED RELEASE TRANSDERMAL at 09:26

## 2024-11-21 RX ADMIN — FAMOTIDINE 20 MILLIGRAM(S): 20 TABLET, FILM COATED ORAL at 17:57

## 2024-11-21 RX ADMIN — POLYETHYLENE GLYCOL 3350 17 GRAM(S): 17 POWDER, FOR SOLUTION ORAL at 17:57

## 2024-11-21 RX ADMIN — Medication 12.5 MILLIGRAM(S): at 12:09

## 2024-11-21 RX ADMIN — CLOPIDOGREL 75 MILLIGRAM(S): 75 TABLET, FILM COATED ORAL at 11:21

## 2024-11-21 RX ADMIN — POLYETHYLENE GLYCOL 3350 17 GRAM(S): 17 POWDER, FOR SOLUTION ORAL at 05:11

## 2024-11-21 RX ADMIN — SCOPOLAMINE 1 PATCH: 1 PATCH, EXTENDED RELEASE TRANSDERMAL at 12:17

## 2024-11-21 NOTE — PROGRESS NOTE ADULT - TIME BILLING
above
review of chart and obtaining interval history, physical exam, review of imaging, coordination of care
review of chart and obtaining interval history, physical exam, review of imaging, coordination of care
review of chart and obtaining interval history, physical exam, review of imaging, coordination of care.
review of chart and obtaining interval history, physical exam, review of imaging, coordination of care

## 2024-11-21 NOTE — PROGRESS NOTE ADULT - ASSESSMENT
64F. PMH: Hx of Cholecystitis s/p lap-roel, former smoker (13 pack years, quit 14 years ago) presented 11/9 c/o dizziness nausea and vomiting since waking up from sleep. NIHSS 0. CTH (+) for Acute infarct involving the left cerebellar hemisphere, CTP showed 5cc of penumbra in L hemisphere, with 4cc of core infarct. 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 No TNK OOW, No MT - due to low NIHSS. MRI showing large cerebellar infarct with partial effacement of 4th ventricle and mass effect on the medulla and left cerebellar tonsillar herniation. Upgraded to NICU 11/10 to start hypertonic saline, NIH at this time 1 for LUE dysmetria. Downgraded to stroke unit 11/7 once stable. Suspect L cerebellar stroke in L vertebral artery distribution secondary to SHEFALI vs ESUS.    NEURO  #stroke  WORKUP/PENDING  - ILR   PLAN  - antiplatelets/anticoagulants: aspirin 81mg daily , clopidogrel 75mg daily   - antilipemics: atorvastatin 40mg daily   - SBP goal: 110-150  - antihypertensives:  - q4h neuro and vitals checks    CARDIOVASCULAR  - SBP goal above    PULM  - call provider if SPO2 < 94%  - HOB >30 degree  - aspiration precautions    GI  - Diet: DASH/TLC  - GI prophylaxis: famotidine 20mg twice daily     #transaminitis - improving    #nausea  - scopolamine patch       - monitor and replete electrolytes  - monitor BUN/Cr  - s/p NaCl tabs  - Na goal 140- 150 for cerebral edema     ID  - monitor for temperature <36C or >38C  - MRSA prophylaxis: not indicated at this time / chlorhexidine 2% cloths    ENDOCRINE  - monitor blood glucose/fingerstick, allowed to be up to 140-180    BEHAVIORAL  - delirium precautions    HEME/ONC  #R peroneal DVT    #normocytic anemia - monitor    #DVT prophylaxis: lovenox / SCDs    --------------------------------------------------  #Activity: ambulate as tolerated  #Code status:   #disposition:   - PT advises discharge to: acute rehab  - OT advises discharge to: acute rehab  - physiatry advises discharge to: 4a  - patient planning to discharge to: 4A 64F. PMH: Hx of Cholecystitis s/p lap-roel, former smoker (13 pack years, quit 14 years ago) presented 11/9 c/o dizziness nausea and vomiting since waking up from sleep. NIHSS 0. CTH (+) for Acute infarct involving the left cerebellar hemisphere, CTP showed 5cc of penumbra in L hemisphere, with 4cc of core infarct. 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 No TNK OOW, No MT - due to low NIHSS. MRI showing large cerebellar infarct with partial effacement of 4th ventricle and mass effect on the medulla and left cerebellar tonsillar herniation. Upgraded to NICU 11/10 to start hypertonic saline, NIH at this time 1 for LUE dysmetria. Downgraded to stroke unit 11/7 once stable. Suspect L cerebellar stroke in L vertebral artery distribution secondary to SHEFALI vs ESUS.    NEURO  #stroke  WORKUP/PENDING  - ILR   PLAN  - antiplatelets/anticoagulants: aspirin 81mg daily , clopidogrel 75mg daily   - antilipemics: atorvastatin 40mg daily   - SBP goal: 110-150  - antihypertensives:  - q4h neuro and vitals checks    #meniere disease  - meclizine 12.5mg q6h prn    CARDIOVASCULAR  - SBP goal above    PULM  - call provider if SPO2 < 94%  - HOB >30 degree  - aspiration precautions    GI  - Diet: DASH/TLC  - GI prophylaxis: famotidine 20mg twice daily     #transaminitis - improving    #nausea  - scopolamine patch       - monitor and replete electrolytes  - monitor BUN/Cr  - s/p NaCl tabs  - Na goal 140- 150 for cerebral edema     ID  - monitor for temperature <36C or >38C  - MRSA prophylaxis: not indicated at this time / chlorhexidine 2% cloths    ENDOCRINE  - monitor blood glucose/fingerstick, allowed to be up to 140-180    BEHAVIORAL  - delirium precautions    HEME/ONC  #R peroneal DVT    #normocytic anemia - monitor    #DVT prophylaxis: lovenox / SCDs    --------------------------------------------------  #Activity: ambulate as tolerated  #Code status:   #disposition:   - PT advises discharge to: acute rehab  - OT advises discharge to: acute rehab  - physiatry advises discharge to: 4a  - patient planning to discharge to: 4A 64F. PMH: Hx of Cholecystitis s/p lap-roel, former smoker (13 pack years, quit 14 years ago) presented 11/9 c/o dizziness nausea and vomiting since waking up from sleep. NIHSS 0. CTH (+) for Acute infarct involving the left cerebellar hemisphere, CTP showed 5cc of penumbra in L hemisphere, with 4cc of core infarct. 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 No TNK OOW, No MT - due to low NIHSS. MRI showing large cerebellar infarct with partial effacement of 4th ventricle and mass effect on the medulla and left cerebellar tonsillar herniation. Upgraded to NICU 11/10 to start hypertonic saline, NIH at this time 1 for LUE dysmetria. Downgraded to stroke unit 11/7 once stable. Suspect L cerebellar stroke in L vertebral artery distribution secondary to SHEFALI vs ESUS.    NEURO  #stroke  WORKUP/PENDING  - ILR   PLAN  - antiplatelets/anticoagulants: aspirin 81mg daily , clopidogrel 75mg daily   - antilipemics: atorvastatin 40mg daily   - SBP goal: 110-150  - antihypertensives:  - q4h neuro and vitals checks    #meniere disease  - meclizine 12.5mg q6h prn    CARDIOVASCULAR  - SBP goal above    PULM  - call provider if SPO2 < 94%  - HOB >30 degree  - aspiration precautions    GI  - Diet: DASH/TLC  - GI prophylaxis: famotidine 20mg twice daily     #transaminitis - improving    #nausea  - scopolamine patch       - monitor and replete electrolytes  - monitor BUN/Cr  - s/p NaCl tabs  - Na goal 140- 150 for cerebral edema     ID  - monitor for temperature <36C or >38C  - MRSA prophylaxis: not indicated at this time / chlorhexidine 2% cloths    ENDOCRINE  - monitor blood glucose/fingerstick, allowed to be up to 140-180    BEHAVIORAL  - delirium precautions    HEME/ONC  #R peroneal DVT    #normocytic anemia - monitor    #DVT prophylaxis: lovenox / SCDs    --------------------------------------------------  #Activity: ambulate as tolerated  #Code status:   #disposition:   - PT advises discharge to: acute rehab  - OT advises discharge to: acute rehab  - physiatry advises discharge to: 4a  - patient planning to discharge to: discharge today 11/21

## 2024-11-21 NOTE — DISCHARGE NOTE NURSING/CASE MANAGEMENT/SOCIAL WORK - FINANCIAL ASSISTANCE
[facility] provides services at a reduced cost to those who are determined to be eligible through [facility]’s financial assistance program. Information regarding [facility]’s financial assistance program can be found by going to [email or web address] or by calling [phone].

## 2024-11-21 NOTE — CHART NOTE - NSCHARTNOTEFT_GEN_A_CORE
EP PROCEDURE NOTE    Date of Procedure: 11-21-24  EP Attending: Dr. Nichols  Assistant:Judie Farr  Referring Physician:   Procedure: Loop Recorder Implant    Indication: 64y Female with history of CVA referred for implantable loop recorder.     Anesthesia: Local    EQUIPMENT IMPLANTED  : Tenrox Linq  Model Number: [id number]  Serial Number: [id number]    PROCEDURE DESCRIPTION  The patient was brought to the electrophysiology procedure area in a non-sedated state and received preoperative antibiotics. Informed, written consent was obtained prior to the procedure. The left anterior chest region was prepped and cleaned from the nipple to the upper left clavicular border with serial applications of Chlorhexidine. Patient was then covered with sterile drapes in the usual manner. Blood pressure, oxygenation, and level of comfort were stable throughout.     Following infiltration with local anesthetic, a 1-cm incision was made along the left sternal border at the fourth intercostal space. Using the tunneling device the implantable loop recorder was inserted into the subcutaneous tissue. Direct pressure was applied to the wound to obtain hemostasis. The wound was approximated with Dermabond. Steri-strips and a dry, sterile dressing were placed over the wound. R waves were noted to be 0.4 mV.     The patient tolerated the procedure well.     COMPLICATIONS  No immediate complications    CONCLUSIONS  Successful loop recorder implant    EBL: 2 cc

## 2024-11-21 NOTE — PROGRESS NOTE ADULT - ATTENDING COMMENTS
64F PMHx of cholecystitis s/p lap roel, not on any medications, presents to ED for dizziness, nausea, and vomiting. LKW 11:30pm the night before. Found to have left cerebellar infarct on CTH and left vertebral artery occlusion on CTA in the ED on 11/9- pt was loaded with ASA/plavix and admitted to stroke neuro  MR done on 11/10 Large acute infarct involving the inferomedial left cerebellum with mass effect resulting in partial effacement of the fourth ventricle without hydrocephalus. There is mass effect resulting in anterolateral displacement of medulla as well as left cerebellar tonsillar herniation through the foramen magnum.    stroke neuro consulted nsicu for evaluation   Pt seen evaluated and chart reviewed    awake alert oriented x3, fluent speech, follows commands on all four, perrl, eomi, motor 5/5 on all four, LUE dysmetria, sensory intact     q1 nihss  hold plavix; c/w ASA , statin   hypertonic saline 3% IVF and mannitol IVPB; goal Na 145-155; serum osm 310-320, monitor I/Os, bmp q6  permissive HTN; pt with completed L cerebellar infarct  NSGY consultation- SOC/hydro watch  TTE  MRA h/n dissection protocol  stroke core measures      dispo- NSICU    pt and family updated at bedside.   pt consented for CVC and A-line
63yo woman presented with dizziness nausea and vomiting since waking up from sleep, found to have acute infarct in left cerebellar hemisphere. CTA H/N notable for L V2 occlusion with patent basilar artery, no dissection visualized and otherwise no notable stenoses elsewhere. Initial NIHSS 0. This morning, patient with worsening headache and nausea and L sided dysmetria on my exam, no deficits in mentation. MRI Brain showing large cerebellar infarct with partial effacement of 4th ventricle and mass effect on the medulla and left cerebellar tonsillar herniation.     Impression: acute L cerebellar stroke   Etiology: suspect embolic; dissection?    Plan:  - STAT NSICU eval for q1h neurochecks and SOC/hydrocephalus watch  - Given 3% NaCl 250cc bolus  - MRA Dissection protocol   - TTE w bubble study  - C/w Aspirin 81mg, hold plavix in case requires crani  - C/w Atorvastatin 80mg daily  - C/w DVT PPX as below  - rest as above
63 y/o woman with hx of HTN, former smoker who p/w dizziness and N/V. Found to have left cerebellar infarct with occluded L VA noted in vessel imaging. Etiology likely vessel-to-vessel in setting of left VA occlusion vs possible cardioembolic.     Patient doing well. Tolerating DAPT and high-intensity statin well. EP follow-up for possible ILR placement. D/C Na tabs. PT/OT/ST. SW for dispo.
65 y/o woman with hx of HTN, former smoker who p/w dizziness and N/V. Found to have left cerebellar infarct with occluded L VA noted in vessel imaging. Etiology likely vessel-to-vessel in setting of left VA occlusion vs possible cardioembolic.     Patient doing well. Tolerating DAPT and high-intensity statin well. EP follow-up for possible ILR placement. Na within normal range. Will likely D/C Na tabs tomorrow. PT/OT/ST. SW for dispo.
65 y/o woman with hx of HTN, former smoker who p/w dizziness and N/V. Found to have left cerebellar infarct with occluded L VA noted in vessel imaging. Etiology likely vessel-to-vessel in setting of left VA occlusion vs possible cardioembolic.     Patient doing well. Tolerating DAPT and high-intensity statin well. EP follow-up for possible ILR placement. PT/OT/ST. SW for dispo.
65 y/o woman with hx of HTN, former smoker who p/w dizziness and N/V. Found to have left cerebellar infarct with occluded L VA noted in vessel imaging. Etiology likely vessel-to-vessel in setting of left VA occlusion vs possible cardioembolic.     Patient doing well. Tolerating DAPT and high-intensity statin well. Analgesics PRN for HA. EP follow-up for ILR placement. PT/OT/ST. SW for dispo.
recommendations as above

## 2024-11-21 NOTE — CHART NOTE - NSCHARTNOTESELECT_GEN_ALL_CORE
Acceptance/Transfer Note
EP/Event Note
GREGORIO/Event Note
GREGORIO/Event Note
Pre Procedural Attestation/Event Note
To NSICU/Transfer Note
Transfer Note
Transfer Note

## 2024-11-21 NOTE — PROGRESS NOTE ADULT - SUBJECTIVE AND OBJECTIVE BOX
Neurology Progress Note    Interval History: still complaining of headache though it improves with tylenol, also reports chronic R hearing loss and ringing in the ears    Medications:  acetaminophen     Tablet .. 650 milliGRAM(s) Oral every 6 hours PRN  aspirin  chewable 81 milliGRAM(s) Oral daily  atorvastatin 40 milliGRAM(s) Oral at bedtime  chlorhexidine 2% Cloths 1 Application(s) Topical <User Schedule>  clopidogrel Tablet 75 milliGRAM(s) Oral daily  enoxaparin Injectable 40 milliGRAM(s) SubCutaneous every 24 hours  famotidine    Tablet 20 milliGRAM(s) Oral two times a day  lactated ringers Bolus 500 milliLiter(s) IV Bolus once  ondansetron Injectable 4 milliGRAM(s) IV Push three times a day PRN  polyethylene glycol 3350 17 Gram(s) Oral every 12 hours  scopolamine 1 mG/72 Hr(s) Patch 1 Patch Transdermal every 72 hours  senna 2 Tablet(s) Oral at bedtime    Vital Signs Last 24 Hrs  T(C): 37.1 (21 Nov 2024 04:30), Max: 37.1 (21 Nov 2024 04:30)  T(F): 98.8 (21 Nov 2024 04:30), Max: 98.8 (21 Nov 2024 04:30)  HR: 68 (21 Nov 2024 04:30) (66 - 74)  BP: 112/69 (21 Nov 2024 04:30) (95/58 - 121/73)  BP(mean): 83 (21 Nov 2024 04:30) (80 - 91)  RR: 18 (21 Nov 2024 04:30) (16 - 18)  SpO2: 96% (21 Nov 2024 04:30) (96% - 97%)    Parameters below as of 21 Nov 2024 04:30  Patient On (Oxygen Delivery Method): room air      Neurologic Physical Exam  Mental status: AOx3  Language: Speech with intact fluency , naming , repetition , comprehension, absent dysarthria  Memory: Recent and remote memory intact. Follows commands. Attention/concentration intact. Fund of knowledge appropriate  Cranial nerves:   II: Visual fields are full to confrontation  III, IV, VI: Extraocular movements intact, no noticeable nystagmus. Pupils equally round and reactive to light  V:  Facial sensation V1-V3 equal and intact   VII: Face is symmetric with normal eye closure and smile  *****VIII: hearing reduced R side  IX, X: Uvula is midline and soft palate rises symmetrically, gag reflex intact  XI: Head turning and shoulder shrug are intact  XII: Tongue protrudes midline  Motor: Normal bulk and tone. No pronator drift.   - shoulder abduction     R 5/5     L 5/5  - elbow flexion     R 5/5     L 5/5  - elbow extension     R 5/5     L 5/5  - hip flexion     R 5/5     L 5/5  - knee flexion     R 5/5     L 5/5  - knee extension     R 5/5     L 5/5  - dorsiflexion     R 5/5     L 5/5  - plantarflexion     R 5/5     L 5/5  Sensation: Intact to light touch bilaterally. No neglect or extinction on double simultaneous testing.  Coordination: No dysmetria on finger-to-nose and heel-to-shin bilaterally, rapid alternating movements intact and symmetric  Reflexes:   - biceps     R 2+     L 2+  - triceps     R 2+     L 2+  - brachioradialis     R 2+     L 2+  - patellar     R 2+     L 2+  - achillles     R 2+     L 2+  - babinski     R downgoing     L downgoing  Gait: Deferred for patient safety    NIHSS: 0    Labs:  CBC Full  -  ( 21 Nov 2024 07:02 )  WBC Count : 6.19 K/uL  RBC Count : 4.62 M/uL  Hemoglobin : 12.8 g/dL  Hematocrit : 38.6 %  Platelet Count - Automated : 257 K/uL  Mean Cell Volume : 83.5 fL  Mean Cell Hemoglobin : 27.7 pg  Mean Cell Hemoglobin Concentration : 33.2 g/dL  Auto Neutrophil # : 3.77 K/uL  Auto Lymphocyte # : 1.74 K/uL  Auto Monocyte # : 0.49 K/uL  Auto Eosinophil # : 0.14 K/uL  Auto Basophil # : 0.04 K/uL  Auto Neutrophil % : 60.9 %  Auto Lymphocyte % : 28.1 %  Auto Monocyte % : 7.9 %  Auto Eosinophil % : 2.3 %  Auto Basophil % : 0.6 %    11-21    139  |  103  |  12  ----------------------------<  98  4.2   |  24  |  0.7    Ca    9.1      21 Nov 2024 07:02  Phos  4.6     11-21  Mg     2.3     11-21    TPro  6.5  /  Alb  3.9  /  TBili  0.7  /  DBili  x   /  AST  39  /  ALT  55[H]  /  AlkPhos  100  11-20    LIVER FUNCTIONS - ( 20 Nov 2024 06:15 )  Alb: 3.9 g/dL / Pro: 6.5 g/dL / ALK PHOS: 100 U/L / ALT: 55 U/L / AST: 39 U/L / GGT: x             Urinalysis Basic - ( 21 Nov 2024 07:02 )    Color: x / Appearance: x / SG: x / pH: x  Gluc: 98 mg/dL / Ketone: x  / Bili: x / Urobili: x   Blood: x / Protein: x / Nitrite: x   Leuk Esterase: x / RBC: x / WBC x   Sq Epi: x / Non Sq Epi: x / Bacteria: x        Pertinent stroke studies/labwork reviewed:    [] CTH -   [] CTA head/Neck -   [] CTP -   [] MRI brain noncontrast -   [] TTE - EF [%] PFO [absent / present] Significant valvular disease [absent / present]  [] GREGORIO - EF [%] PFO [absent / present] Significant valvular disease [absent / present]    CORE MEASURES   [] HbA1c  [] LDL  [] TSH    Pertinent other studies/labwork reviewed:

## 2024-11-21 NOTE — DISCHARGE NOTE NURSING/CASE MANAGEMENT/SOCIAL WORK - PATIENT PORTAL LINK FT
You can access the FollowMyHealth Patient Portal offered by [facility] by registering at the following website: [email or web address]. By joining [facility]’s FollowMyHealth portal, you will also be able to view your health information using other applications (apps) compatible with our system.

## 2024-11-22 RX ORDER — CLOPIDOGREL 75 MG/1
1 TABLET, FILM COATED ORAL
Qty: 9 | Refills: 0
Start: 2024-11-22 | End: 2024-11-30

## 2024-11-27 DIAGNOSIS — I82.451 ACUTE EMBOLISM AND THROMBOSIS OF RIGHT PERONEAL VEIN: ICD-10-CM

## 2024-11-27 DIAGNOSIS — I10 ESSENTIAL (PRIMARY) HYPERTENSION: ICD-10-CM

## 2024-11-27 DIAGNOSIS — R74.01 ELEVATION OF LEVELS OF LIVER TRANSAMINASE LEVELS: ICD-10-CM

## 2024-11-27 DIAGNOSIS — Z87.891 PERSONAL HISTORY OF NICOTINE DEPENDENCE: ICD-10-CM

## 2024-11-27 DIAGNOSIS — G93.5 COMPRESSION OF BRAIN: ICD-10-CM

## 2024-11-27 DIAGNOSIS — G93.6 CEREBRAL EDEMA: ICD-10-CM

## 2024-11-27 DIAGNOSIS — R27.0 ATAXIA, UNSPECIFIED: ICD-10-CM

## 2024-11-27 DIAGNOSIS — Z88.0 ALLERGY STATUS TO PENICILLIN: ICD-10-CM

## 2024-11-27 DIAGNOSIS — I63.212 CEREBRAL INFARCTION DUE TO UNSPECIFIED OCCLUSION OR STENOSIS OF LEFT VERTEBRAL ARTERY: ICD-10-CM

## 2024-11-27 DIAGNOSIS — R29.700 NIHSS SCORE 0: ICD-10-CM

## 2024-11-27 DIAGNOSIS — D64.9 ANEMIA, UNSPECIFIED: ICD-10-CM

## 2024-12-10 ENCOUNTER — APPOINTMENT (OUTPATIENT)
Dept: NEUROLOGY | Facility: CLINIC | Age: 64
End: 2024-12-10

## 2024-12-11 ENCOUNTER — APPOINTMENT (OUTPATIENT)
Dept: ELECTROPHYSIOLOGY | Facility: CLINIC | Age: 64
End: 2024-12-11

## 2025-01-14 NOTE — DISCHARGE NOTE PROVIDER - NSDCHHCONTACT_GEN_ALL_CORE_FT
[Mother] : mother no As certified below, I, or a nurse practitioner or physician assistant working with me, had a face-to-face encounter that meets the physician face-to-face encounter requirements.

## 2025-01-15 ENCOUNTER — NON-APPOINTMENT (OUTPATIENT)
Age: 65
End: 2025-01-15

## 2025-01-15 ENCOUNTER — APPOINTMENT (OUTPATIENT)
Dept: CARDIOLOGY | Facility: CLINIC | Age: 65
End: 2025-01-15
Payer: SELF-PAY

## 2025-01-15 PROCEDURE — 93298 REM INTERROG DEV EVAL SCRMS: CPT

## 2025-02-05 ENCOUNTER — NON-APPOINTMENT (OUTPATIENT)
Age: 65
End: 2025-02-05

## 2025-02-05 ENCOUNTER — APPOINTMENT (OUTPATIENT)
Dept: ELECTROPHYSIOLOGY | Facility: CLINIC | Age: 65
End: 2025-02-05
Payer: MEDICAID

## 2025-02-05 ENCOUNTER — EMERGENCY (EMERGENCY)
Facility: HOSPITAL | Age: 65
LOS: 0 days | Discharge: HOME | End: 2025-02-06
Attending: EMERGENCY MEDICINE
Payer: MEDICAID

## 2025-02-05 VITALS
TEMPERATURE: 98 F | WEIGHT: 169.98 LBS | SYSTOLIC BLOOD PRESSURE: 115 MMHG | RESPIRATION RATE: 18 BRPM | HEART RATE: 73 BPM | DIASTOLIC BLOOD PRESSURE: 77 MMHG | OXYGEN SATURATION: 98 % | HEIGHT: 64 IN

## 2025-02-05 VITALS
WEIGHT: 170 LBS | HEART RATE: 83 BPM | HEIGHT: 59 IN | DIASTOLIC BLOOD PRESSURE: 70 MMHG | BODY MASS INDEX: 34.27 KG/M2 | SYSTOLIC BLOOD PRESSURE: 110 MMHG

## 2025-02-05 DIAGNOSIS — I63.9 CEREBRAL INFARCTION, UNSPECIFIED: ICD-10-CM

## 2025-02-05 DIAGNOSIS — Z48.89 ENCOUNTER FOR OTHER SPECIFIED SURGICAL AFTERCARE: ICD-10-CM

## 2025-02-05 DIAGNOSIS — Z98.890 OTHER SPECIFIED POSTPROCEDURAL STATES: Chronic | ICD-10-CM

## 2025-02-05 DIAGNOSIS — Z45.09 ENCOUNTER FOR ADJUSTMENT AND MANAGEMENT OF OTHER CARDIAC DEVICE: ICD-10-CM

## 2025-02-05 DIAGNOSIS — Z95.818 PRESENCE OF OTHER CARDIAC IMPLANTS AND GRAFTS: ICD-10-CM

## 2025-02-05 DIAGNOSIS — Z90.49 ACQUIRED ABSENCE OF OTHER SPECIFIED PARTS OF DIGESTIVE TRACT: Chronic | ICD-10-CM

## 2025-02-05 LAB
ALBUMIN SERPL ELPH-MCNC: 4.4 G/DL — SIGNIFICANT CHANGE UP (ref 3.5–5.2)
ALP SERPL-CCNC: 126 U/L — HIGH (ref 30–115)
ALT FLD-CCNC: 19 U/L — SIGNIFICANT CHANGE UP (ref 0–41)
ANION GAP SERPL CALC-SCNC: 9 MMOL/L — SIGNIFICANT CHANGE UP (ref 7–14)
APPEARANCE UR: CLEAR — SIGNIFICANT CHANGE UP
APTT BLD: 29.3 SEC — SIGNIFICANT CHANGE UP (ref 27–39.2)
AST SERPL-CCNC: 30 U/L — SIGNIFICANT CHANGE UP (ref 0–41)
BASOPHILS # BLD AUTO: 0.04 K/UL — SIGNIFICANT CHANGE UP (ref 0–0.2)
BASOPHILS NFR BLD AUTO: 0.6 % — SIGNIFICANT CHANGE UP (ref 0–1)
BILIRUB SERPL-MCNC: 0.4 MG/DL — SIGNIFICANT CHANGE UP (ref 0.2–1.2)
BILIRUB UR-MCNC: NEGATIVE — SIGNIFICANT CHANGE UP
BUN SERPL-MCNC: 9 MG/DL — LOW (ref 10–20)
CALCIUM SERPL-MCNC: 8.8 MG/DL — SIGNIFICANT CHANGE UP (ref 8.4–10.5)
CHLORIDE SERPL-SCNC: 107 MMOL/L — SIGNIFICANT CHANGE UP (ref 98–110)
CO2 SERPL-SCNC: 28 MMOL/L — SIGNIFICANT CHANGE UP (ref 17–32)
COLOR SPEC: YELLOW — SIGNIFICANT CHANGE UP
CREAT SERPL-MCNC: 0.6 MG/DL — LOW (ref 0.7–1.5)
DIFF PNL FLD: ABNORMAL
EGFR: 100 ML/MIN/1.73M2 — SIGNIFICANT CHANGE UP
EOSINOPHIL # BLD AUTO: 0.08 K/UL — SIGNIFICANT CHANGE UP (ref 0–0.7)
EOSINOPHIL NFR BLD AUTO: 1.3 % — SIGNIFICANT CHANGE UP (ref 0–8)
GLUCOSE SERPL-MCNC: 99 MG/DL — SIGNIFICANT CHANGE UP (ref 70–99)
GLUCOSE UR QL: NEGATIVE MG/DL — SIGNIFICANT CHANGE UP
HCT VFR BLD CALC: 41.7 % — SIGNIFICANT CHANGE UP (ref 37–47)
HGB BLD-MCNC: 13.9 G/DL — SIGNIFICANT CHANGE UP (ref 12–16)
IMM GRANULOCYTES NFR BLD AUTO: 0.3 % — SIGNIFICANT CHANGE UP (ref 0.1–0.3)
INR BLD: 0.88 RATIO — SIGNIFICANT CHANGE UP (ref 0.65–1.3)
KETONES UR-MCNC: NEGATIVE MG/DL — SIGNIFICANT CHANGE UP
LEUKOCYTE ESTERASE UR-ACNC: ABNORMAL
LYMPHOCYTES # BLD AUTO: 2.17 K/UL — SIGNIFICANT CHANGE UP (ref 1.2–3.4)
LYMPHOCYTES # BLD AUTO: 34.5 % — SIGNIFICANT CHANGE UP (ref 20.5–51.1)
MCHC RBC-ENTMCNC: 28 PG — SIGNIFICANT CHANGE UP (ref 27–31)
MCHC RBC-ENTMCNC: 33.3 G/DL — SIGNIFICANT CHANGE UP (ref 32–37)
MCV RBC AUTO: 84.1 FL — SIGNIFICANT CHANGE UP (ref 81–99)
MONOCYTES # BLD AUTO: 0.43 K/UL — SIGNIFICANT CHANGE UP (ref 0.1–0.6)
MONOCYTES NFR BLD AUTO: 6.8 % — SIGNIFICANT CHANGE UP (ref 1.7–9.3)
NEUTROPHILS # BLD AUTO: 3.55 K/UL — SIGNIFICANT CHANGE UP (ref 1.4–6.5)
NEUTROPHILS NFR BLD AUTO: 56.5 % — SIGNIFICANT CHANGE UP (ref 42.2–75.2)
NITRITE UR-MCNC: NEGATIVE — SIGNIFICANT CHANGE UP
NRBC # BLD: 0 /100 WBCS — SIGNIFICANT CHANGE UP (ref 0–0)
NRBC BLD-RTO: 0 /100 WBCS — SIGNIFICANT CHANGE UP (ref 0–0)
PH UR: 7.5 — SIGNIFICANT CHANGE UP (ref 5–8)
PLATELET # BLD AUTO: 223 K/UL — SIGNIFICANT CHANGE UP (ref 130–400)
PMV BLD: 10.2 FL — SIGNIFICANT CHANGE UP (ref 7.4–10.4)
POTASSIUM SERPL-MCNC: 4.3 MMOL/L — SIGNIFICANT CHANGE UP (ref 3.5–5)
POTASSIUM SERPL-SCNC: 4.3 MMOL/L — SIGNIFICANT CHANGE UP (ref 3.5–5)
PROT SERPL-MCNC: 6.9 G/DL — SIGNIFICANT CHANGE UP (ref 6–8)
PROT UR-MCNC: NEGATIVE MG/DL — SIGNIFICANT CHANGE UP
PROTHROM AB SERPL-ACNC: 10.3 SEC — SIGNIFICANT CHANGE UP (ref 9.95–12.87)
RBC # BLD: 4.96 M/UL — SIGNIFICANT CHANGE UP (ref 4.2–5.4)
RBC # FLD: 12.8 % — SIGNIFICANT CHANGE UP (ref 11.5–14.5)
SODIUM SERPL-SCNC: 144 MMOL/L — SIGNIFICANT CHANGE UP (ref 135–146)
SP GR SPEC: 1.01 — SIGNIFICANT CHANGE UP (ref 1–1.03)
TROPONIN T, HIGH SENSITIVITY RESULT: 7 NG/L — SIGNIFICANT CHANGE UP (ref 6–13)
TROPONIN T, HIGH SENSITIVITY RESULT: 9 NG/L — SIGNIFICANT CHANGE UP (ref 6–13)
UROBILINOGEN FLD QL: 0.2 MG/DL — SIGNIFICANT CHANGE UP (ref 0.2–1)
WBC # BLD: 6.29 K/UL — SIGNIFICANT CHANGE UP (ref 4.8–10.8)
WBC # FLD AUTO: 6.29 K/UL — SIGNIFICANT CHANGE UP (ref 4.8–10.8)

## 2025-02-05 PROCEDURE — 93291 INTERROG DEV EVAL SCRMS IP: CPT

## 2025-02-05 PROCEDURE — 85025 COMPLETE CBC W/AUTO DIFF WBC: CPT

## 2025-02-05 PROCEDURE — 81001 URINALYSIS AUTO W/SCOPE: CPT

## 2025-02-05 PROCEDURE — 96374 THER/PROPH/DIAG INJ IV PUSH: CPT | Mod: XU

## 2025-02-05 PROCEDURE — 93005 ELECTROCARDIOGRAM TRACING: CPT

## 2025-02-05 PROCEDURE — 99212 OFFICE O/P EST SF 10 MIN: CPT

## 2025-02-05 PROCEDURE — 87086 URINE CULTURE/COLONY COUNT: CPT

## 2025-02-05 PROCEDURE — 99223 1ST HOSP IP/OBS HIGH 75: CPT

## 2025-02-05 PROCEDURE — 80053 COMPREHEN METABOLIC PANEL: CPT

## 2025-02-05 PROCEDURE — 70551 MRI BRAIN STEM W/O DYE: CPT | Mod: MC

## 2025-02-05 PROCEDURE — 99284 EMERGENCY DEPT VISIT MOD MDM: CPT | Mod: GC

## 2025-02-05 PROCEDURE — 85610 PROTHROMBIN TIME: CPT

## 2025-02-05 PROCEDURE — 71045 X-RAY EXAM CHEST 1 VIEW: CPT

## 2025-02-05 PROCEDURE — 93000 ELECTROCARDIOGRAM COMPLETE: CPT | Mod: 59

## 2025-02-05 PROCEDURE — 36415 COLL VENOUS BLD VENIPUNCTURE: CPT

## 2025-02-05 PROCEDURE — 99285 EMERGENCY DEPT VISIT HI MDM: CPT | Mod: 25

## 2025-02-05 PROCEDURE — 71045 X-RAY EXAM CHEST 1 VIEW: CPT | Mod: 26

## 2025-02-05 PROCEDURE — 93010 ELECTROCARDIOGRAM REPORT: CPT

## 2025-02-05 PROCEDURE — 70450 CT HEAD/BRAIN W/O DYE: CPT | Mod: XU,TC

## 2025-02-05 PROCEDURE — 84484 ASSAY OF TROPONIN QUANT: CPT

## 2025-02-05 PROCEDURE — 70450 CT HEAD/BRAIN W/O DYE: CPT | Mod: 26,59

## 2025-02-05 PROCEDURE — 85730 THROMBOPLASTIN TIME PARTIAL: CPT

## 2025-02-05 PROCEDURE — G0378: CPT

## 2025-02-05 PROCEDURE — 70496 CT ANGIOGRAPHY HEAD: CPT | Mod: MC

## 2025-02-05 PROCEDURE — 70498 CT ANGIOGRAPHY NECK: CPT | Mod: MC

## 2025-02-05 PROCEDURE — 70498 CT ANGIOGRAPHY NECK: CPT | Mod: 26

## 2025-02-05 PROCEDURE — 70496 CT ANGIOGRAPHY HEAD: CPT | Mod: 26

## 2025-02-05 RX ORDER — PROCHLORPERAZINE MALEATE 5 MG/1
5 TABLET ORAL ONCE
Refills: 0 | Status: COMPLETED | OUTPATIENT
Start: 2025-02-05 | End: 2025-02-05

## 2025-02-05 RX ORDER — ASPIRIN 81 MG/1
81 TABLET, COATED ORAL DAILY
Refills: 0 | Status: DISCONTINUED | OUTPATIENT
Start: 2025-02-05 | End: 2025-02-06

## 2025-02-05 RX ORDER — ATORVASTATIN CALCIUM 80 MG/1
40 TABLET, FILM COATED ORAL AT BEDTIME
Refills: 0 | Status: DISCONTINUED | OUTPATIENT
Start: 2025-02-05 | End: 2025-02-06

## 2025-02-05 RX ADMIN — ATORVASTATIN CALCIUM 40 MILLIGRAM(S): 80 TABLET, FILM COATED ORAL at 17:24

## 2025-02-05 RX ADMIN — PROCHLORPERAZINE MALEATE 102 MILLIGRAM(S): 5 TABLET ORAL at 14:33

## 2025-02-05 RX ADMIN — ASPIRIN 81 MILLIGRAM(S): 81 TABLET, COATED ORAL at 17:24

## 2025-02-05 NOTE — ED ADULT TRIAGE NOTE - HEART RATE (BEATS/MIN)
Telephone Encounter by Malinda Clayton MD at 05/12/17 07:09 AM     Author:  Malinda Clayton MD Service:  (none) Author Type:  Physician     Filed:  05/12/17 07:10 AM Encounter Date:  5/11/2017 Status:  Signed     :  Malinda Clayton MD (Physician)            Follow up at the 3 month arun and med adjustments can be made then  The medication is a tool for weight loss  She had very good weight loss prior to last visit, weight loss will slow over time naturally with or without medication  Continue exercise and diet changes  Continue current med  Recheck weight at June appt and evaluate then[HW1.1M]  Electronically Signed by:    Malinda Clayton MD , 5/12/2017[HW1.2T]        Revision History        User Key Date/Time User Provider Type Action    > HW1.2 05/12/17 07:10 AM Malinda Clayton MD Physician Sign     HW1.1 05/12/17 07:09 AM Malinda Clayton MD Physician     M - Manual, T - Template             73

## 2025-02-05 NOTE — ED ADULT TRIAGE NOTE - CHIEF COMPLAINT QUOTE
BIBEMS from [address] [place] [address] for loop recorder f/u, dizziness, blurry vision, and headache x 3 days. denies CP, SOB.

## 2025-02-05 NOTE — ED PROVIDER NOTE - CLINICAL SUMMARY MEDICAL DECISION MAKING FREE TEXT BOX
Pt with worsening dizziness, sent in by cardio for r/o cva.  pt has not been able to f/u with doctors since her cva admission in nov 2024, stopped taking her antiplatelets.  imaging shows no acute pathology.  neuro consulted and recommends obs for mri.  Any ordered labs and EKG were reviewed, Dr. Monica Kevin, attending physician.  Any imaging was ordered and reviewed by me, Dr. Monica Kevin, attending physician.  Appropriate medications for patient's presenting complaints were ordered and effects were reassessed.  Patient's records, if available,  (prior hospital, ED visit, and/or nursing home notes if available) were reviewed.  Additional history was obtained from EMS, family, and/or PCP (when available).  Escalation to admission/observation was considered. Patient requires inpatient hospitalization - monitored setting.

## 2025-02-05 NOTE — ED CDU PROVIDER INITIAL DAY NOTE - CLINICAL SUMMARY MEDICAL DECISION MAKING FREE TEXT BOX
64-year-old woman, history of hypertension, hyperlipidemia, CVA, not currently on any medications was placed in CDU for MRI after she complained of headache associated with dizziness over the last 3 days.  ED workup unremarkable.  Vital signs, exam as noted.  No focal deficits.  MRI negative for acute infarct.  Prescriptions refilled, patient discharged to follow-up neuro.

## 2025-02-05 NOTE — ED ADULT NURSE REASSESSMENT NOTE - NS ED NURSE REASSESS COMMENT FT1
Language Line Audio Intepreter [id number] [place] helped to translate for pt Karlo Pricillajeffery. Went over MRI checklist with pt, pt states she has metal placed in her heart and has [employer] card. MRI, and PA notified and made aware of [employer] card.

## 2025-02-05 NOTE — CONSULT NOTE ADULT - ATTENDING COMMENTS
Patient seen and examined and agree with above except as noted.  Patients history, notes, labs, imaging, vitals and meds reviewed personally.    Plan as above  If MRI brain negative for acute process then ok to discharge

## 2025-02-05 NOTE — ED PROVIDER NOTE - ATTENDING APP SHARED VISIT CONTRIBUTION OF CARE
65 yo f with pmh of htn, cva (supposed to be on asa and plavix) with residual L sided weakness, sent in by dr. montgomery' office to be evaluated for dizziness.  pt had recent cva nov 2025 to cerebellum.  was dc with asa and plavix and was supposed to f/u with dr. davila within 2 weeks.  daughter says they have had difficulty securing follow up appointments with her cardio, neuro and pmd.  pt ran out of her meds 1 month ago.  pt admits feels more off balance for the last few days.  no new weakness.  no n/v/d.  pt admits also having headaches.  no cp or sob.  exam: nad, ncat, perrl, eomi, mmm, rrr, ctab, abd soft, nt, nd aox3, no calf tenderness, no pitting edema, LLE 4/5 imp: pt with recent cva, has not been taking her meds, ran out and has more dizziness.  will r/o new cva. will ct head, labs, neuro consult 65 yo f with pmh of htn, cva (supposed to be on asa and plavix) with residual L sided weakness, sent in by dr. montgomery' office to be evaluated for dizziness.  pt had recent cva nov 2024 to cerebellum.  was dc with asa and plavix and was supposed to f/u with dr. davila within 2 weeks.  daughter says they have had difficulty securing follow up appointments with her cardio, neuro and pmd.  pt ran out of her meds 1 month ago.  pt admits feels more off balance for the last few days.  no new weakness.  no n/v/d.  pt admits also having headaches.  no cp or sob.  exam: nad, ncat, perrl, eomi, mmm, rrr, ctab, abd soft, nt, nd aox3, no calf tenderness, no pitting edema, LLE 4/5 imp: pt with recent cva, has not been taking her meds, ran out and has more dizziness.  will r/o new cva. will ct head, labs, neuro consult

## 2025-02-05 NOTE — CONSULT NOTE ADULT - SUBJECTIVE AND OBJECTIVE BOX
Neurology Consult    Patient is a 64y old  Female who presents with a chief complaint of worsening HA and dizziness over 3 days     : [place] [id number]    HPI:  Ms. Dietrich is a 65yo RHF Arabic speaking w/ PMHx of Left posterior cerebellar hemisphere stroke w/ 4th ventricular effacement (11/2024) w/o residual symptoms likely thromboembolic vs ESUS s/p ILR and Cholecystitis s/p Lap-roel. She reported being referred by the outpatient EP office today to go to the ED for persistent HA and dizziness that has since worsened over the past 3 days.     She reported waking up on monday morning w/ a posterior pressure like Headache that radiates to the front and sides of the head. Her headaches are 8/10 at its worst and most at the time rests at 2/10. they are worst w/ bending over, with bright light and lots of sound. She has not tried any medication for relief however, feels less when she lays down. Her headaches are associated with right ear buzzing sound, mild OS blurriness as well as mild dizziness. Her headache has been ongoing since prior to her stroke but they were never 8/10. Her OS visual changes has been ongoing prior to her stroke and has not been worst. She denied tearing of the eye, loss of vision, pain with eye movement, neck pain or stiffness, n/v or fall. No new medication changes or illness.       PAST MEDICAL & SURGICAL HISTORY:  Left cerebellar hemisphere stroke (11/2024)    S/P cholecystectomy    H/O colonoscopy 2017      FAMILY HISTORY:      Social History: (-) x 3    Allergies: penicillin (Hives)    Intolerances        MEDICATIONS  (STANDING):  aspirin  chewable 81 milliGRAM(s) Oral daily  atorvastatin 40 milliGRAM(s) Oral at bedtime    MEDICATIONS  (PRN):      Review of systems:    Constitutional: as per HPI  Eyes: No eye pain or discharge  ENMT:  No difficulty hearing; No sinus or throat pain  Neck: No pain or stiffness  Respiratory: No cough, wheezing, chills or hemoptysis  Cardiovascular: No chest pain, palpitations, shortness of breath, dyspnea on exertion  Gastrointestinal: No abdominal pain, nausea, vomiting or hematemesis; No diarrhea or constipation.   Genitourinary: No dysuria, frequency, hematuria or incontinence  Neurological: As per HPI  Skin: No rashes or lesions   Musculoskeletal: No joint pain or swelling    Vital Signs Last 24 Hrs  T(C): 37 (05 Feb 2025 16:18), Max: 37 (05 Feb 2025 16:18)  T(F): 98.6 (05 Feb 2025 16:18), Max: 98.6 (05 Feb 2025 16:18)  HR: 74 (05 Feb 2025 16:18) (73 - 74)  BP: 106/71 (05 Feb 2025 16:18) (106/71 - 115/77)  RR: 18 (05 Feb 2025 16:18) (18 - 18)  SpO2: 98% (05 Feb 2025 16:18) (98% - 98%)    Parameters below as of 05 Feb 2025 10:51  Patient On (Oxygen Delivery Method): room air        Examination:  General:  Appearance is consistent with chronologic age.  No abnormal facies.  Gross skin survey within normal limits.    Cognitive/Language:  The patient is oriented to person, place, time and date.  Recent and remote memory intact. Language with normal repetition, comprehension and naming.  Nondysarthric.    Eyes: OS 20/40, OD 20/25 -1, VFF.  EOMI w/o nystagmus, skew or reported double vision.  PERRL.  No ptosis/weakness of eyelid closure.    Face:  Facial sensation normal V1 - 3, mild facial asymmetry.    Ears/Nose/Throat:  Hearing grossly intact b/l.  Palate elevates midline.  Tongue and uvula midline.   Motor examination:   Normal tone, bulk and range of motion.  No tenderness, twitching, tremors or involuntary movements.  Formal Muscle Strength Testing: (MRC grade R/L) 5/5 UE; 5/5 LE.  No observable drift.  Reflexes:   2+ b/l biceps, triceps, brachioradialis, patella and Achilles. Neg Sterling, clonus absent.  Sensory examination:   Intact to light touch in all extremities. Romberg neg  Cerebellum:   FTN/HKS intact with normal MERYL in all limbs.  No dysmetria.   Gait narrow based and normal. Stable heel and toe walks    palpable b/l temporal artery   no molar sheering  tender occipital region to palpation  tender paracervical   No cervical rigidity    Labs:   CBC Full  -  ( 05 Feb 2025 12:30 )  WBC Count : 6.29 K/uL  RBC Count : 4.96 M/uL  Hemoglobin : 13.9 g/dL  Hematocrit : 41.7 %  Platelet Count - Automated : 223 K/uL  Mean Cell Volume : 84.1 fL  Mean Cell Hemoglobin : 28.0 pg  Mean Cell Hemoglobin Concentration : 33.3 g/dL  Auto Neutrophil # : 3.55 K/uL  Auto Lymphocyte # : 2.17 K/uL  Auto Monocyte # : 0.43 K/uL  Auto Eosinophil # : 0.08 K/uL  Auto Basophil # : 0.04 K/uL  Auto Neutrophil % : 56.5 %  Auto Lymphocyte % : 34.5 %  Auto Monocyte % : 6.8 %  Auto Eosinophil % : 1.3 %  Auto Basophil % : 0.6 %    02-05    144  |  107  |  9[L]  ----------------------------<  99  4.3   |  28  |  0.6[L]    Ca    8.8      05 Feb 2025 12:30    TPro  6.9  /  Alb  4.4  /  TBili  0.4  /  DBili  x   /  AST  30  /  ALT  19  /  AlkPhos  126[H]  02-05    LIVER FUNCTIONS - ( 05 Feb 2025 12:30 )  Alb: 4.4 g/dL / Pro: 6.9 g/dL / ALK PHOS: 126 U/L / ALT: 19 U/L / AST: 30 U/L / GGT: x           PT/INR - ( 05 Feb 2025 12:30 )   PT: 10.30 sec;   INR: 0.88 ratio         PTT - ( 05 Feb 2025 12:30 )  PTT:29.3 sec  Urinalysis Basic - ( 05 Feb 2025 12:30 )    Color: x / Appearance: x / SG: x / pH: x  Gluc: 99 mg/dL / Ketone: x  / Bili: x / Urobili: x   Blood: x / Protein: x / Nitrite: x   Leuk Esterase: x / RBC: x / WBC x   Sq Epi: x / Non Sq Epi: x / Bacteria: x          Neuroimaging:  NCHCT: CT Head No Cont:   ACC: [id number] EXAM:  CT BRAIN   ORDERED BY: OPAL CARR     PROCEDURE DATE:  02/05/2025          INTERPRETATION:  Clinical history: Stroke follow-up    Technique: Noncontrast head CT.  Contiguous CT axial images of the head   from the base to the vertex with coronal and sagittal reformats.    Comparison/correlation: CT head dated 11/15/2024.    Findings:    The left cerebellar infarct has evolved into a chronic appearance with   resolution of the mass effect.    The ventricles and cortical sulci are within normal limits with no   evidence of hydrocephalus.    There is a stable tiny chronic infarct within the right cerebellar   hemisphere. Stable scattered punctate superficial calcifications which   are nonspecific.    There is no evidence of acute intracranial hemorrhage, midline shift or   hydrocephalus.    There is mild right maxillary/ethmoid sinus mucosal thickening.   Underpneumatization of the right mastoid complex.    IMPRESSION:    1.  No evidence of acute intracranial pathology.    2.  The left cerebellar infarct has evolved into a chronic appearance with resolution of the mass effect.    --- End of Report ---            NHI SOSA MD; Attending Radiologist  This document has been electronically signed. Feb 5 2025  1:51PM (02-05-25 @ 13:44)      02-05-25 @ 17:10

## 2025-02-05 NOTE — CONSULT NOTE ADULT - ASSESSMENT
63yo RHF Wallisian speaking w/ PMHx of Left posterior cerebellar hemisphere stroke w/ 4th ventricular effacement (11/2024) w/o residual symptoms likely thromboembolic vs ESUS s/p ILR and Cholecystitis s/p Lap-roel. She was referred to the ED by EP office for c/o worsening of her HA and dizziness since x 3 days. HAs are posterior then radiate frontal and bitemporal, exacerbated w/ hip flexion, light and sound and better when supine. Nontender sinus, Mild occipital tenderness, decrease OS vision compare to OD, no paresis or hypoesthesia. presentation and exam is suggestive of occipital cephalgia vs migraine. No acute change on CTH and CTA w/ patent cerebrovascular, No aneurysm or avm or dissection. Since she was non-compliant w/ her secondary stroke prevention will follow up w/ MRI for further assessment. No event on ILR per report.     Recommendation  Trial of Compazine IV   Can give migraine cocktail if no improvement  Admit to OBS   MRI brain w/o JUVENTINO considering non-compliance w/ secondary prevention  Aspirin 81mg QD  Atorvastatin 40mg QD  Ok to discharge if MRI brain is negative for acute stroke    Case discussed w/ Dr. Kim

## 2025-02-05 NOTE — ED PROVIDER NOTE - PHYSICAL EXAMINATION
CONSTITUTIONAL: Well-appearing; well-nourished; in no apparent distress.   EYES: PERRL; EOM intact.   NECK: Supple; non-tender; no cervical lymphadenopathy.   CARDIOVASCULAR: Normal S1, S2; no murmurs, rubs, or gallops.   RESPIRATORY: Normal chest excursion with respiration; breath sounds clear and equal bilaterally; no wheezes, rhonchi, or rales.  GI/: Non-distended; non-tender; no palpable organomegaly.   MS: L sided weakness (chronic per pt) No evidence of trauma or deformity. No CVA tenderness. Normal ROM in all four extremities; non-tender to palpation; distal pulses are normal.   SKIN: Normal for age and race; warm; dry; good turgor; no apparent lesions or exudate.   NEURO/PSYCH: A & O x 4; grossly unremarkable. mood and manner are appropriate.

## 2025-02-05 NOTE — CONSULT NOTE ADULT - CONSULT REASON
63y/o F post CVA 11/2024 with dizziness HA for 3 days. Off meds x 1mo as did not f/u with pmd or ortho 2/2 no appt available since dc from hospital.

## 2025-02-05 NOTE — ED PROVIDER NOTE - PROGRESS NOTE DETAILS
lorie [id number] [employer] Loop Recorder implanted 11/21/2024, Model LNQ22 with serial number [id number]

## 2025-02-05 NOTE — ED CDU PROVIDER INITIAL DAY NOTE - PROGRESS NOTE DETAILS
Spoke with patient with Chinese interpeter #[id number].  Patient with hx of CVA c/o dizziness. Neuro recommending MRI. Patient agreeable with plan for MRI.

## 2025-02-05 NOTE — ED PROVIDER NOTE - OBJECTIVE STATEMENT
pt with pmhx HTN HLD CVA 11/2024 off meds for 1 mo 2/2 no available f/u with neuro or PMD post dc from hospital sent from cardio to r/o stroke 2/2 dizziness HA x 3 days. pain is sharp, nonradiating, moderate. denies exacerbating or relieving factors. Denies fever/chill/HA/dizziness/chest pain/palpitation/sob/abd pain/n/v/d/ black stool/bloody stool/urinary sxs

## 2025-02-06 VITALS
DIASTOLIC BLOOD PRESSURE: 68 MMHG | SYSTOLIC BLOOD PRESSURE: 119 MMHG | HEART RATE: 73 BPM | OXYGEN SATURATION: 97 % | RESPIRATION RATE: 18 BRPM | TEMPERATURE: 98 F

## 2025-02-06 PROCEDURE — 70551 MRI BRAIN STEM W/O DYE: CPT | Mod: 26

## 2025-02-06 PROCEDURE — 99239 HOSP IP/OBS DSCHRG MGMT >30: CPT

## 2025-02-06 RX ORDER — FAMOTIDINE 10 MG/ML
1 INJECTION INTRAVENOUS
Qty: 28 | Refills: 0
Start: 2025-02-06 | End: 2025-02-19

## 2025-02-06 RX ORDER — ASPIRIN 81 MG/1
1 TABLET, COATED ORAL
Qty: 30 | Refills: 0
Start: 2025-02-06 | End: 2025-03-07

## 2025-02-06 RX ORDER — ATORVASTATIN CALCIUM 80 MG/1
1 TABLET, FILM COATED ORAL
Qty: 30 | Refills: 0
Start: 2025-02-06 | End: 2025-03-07

## 2025-02-06 NOTE — ED CDU PROVIDER SUBSEQUENT DAY NOTE - HISTORY
pt resting comfortably throughout the night, no complaints - cleared for study with MRI Tech - [employer] loop recorder model LNQ22 with serial number [id number]

## 2025-02-06 NOTE — ED CDU PROVIDER DISPOSITION NOTE - PATIENT PORTAL LINK FT
You can access the FollowMyHealth Patient Portal offered by [facility] by registering at the following website: [email or web address]. By joining [employer]’s FollowMyHealth portal, you will also be able to view your health information using other applications (apps) compatible with our system.

## 2025-02-06 NOTE — ED CDU PROVIDER DISPOSITION NOTE - NSFOLLOWUPCLINICS_GEN_ALL_ED_FT
[facility]  Neurology  [address] [place] [address], [facility] [address]  [place] [address]  Phone: [phone]  Fax:   Follow Up Time: 1-3 Days

## 2025-02-06 NOTE — ED ADULT NURSE NOTE - CHIEF COMPLAINT QUOTE
BIBEMS from [address] [place] [address] for loop recorder f/u, dizziness, blurry vision, and headache x 3 days. denies CP, SOB.

## 2025-02-06 NOTE — ED CDU PROVIDER DISPOSITION NOTE - NSFOLLOWUPINSTRUCTIONS_ED_ALL_ED_FT
Our Emergency Department Referral Coordinators will be reaching out to you in the next 24-48 hours from 9:00am to 5:00pm to schedule a follow up appointment. Please expect a phone call from the hospital in that time frame. If you do not receive a call or if you have any questions or concerns, you can reach them at   [phone].    Dizziness    Dizziness is a common problem. It is a feeling of unsteadiness or light-headedness. You may feel like you are about to faint. This condition can be caused by a number of things, including medicines, dehydration, or illness. Drink enough fluid to keep your urine clear or pale yellow. Do not drink alcohol and limit your caffeine and salt intake. Avoid quick movement.  Rise slowly from chairs and steady yourself until you feel okay. In the morning, first sit up on the side of the bed.    SEEK IMMEDIATE MEDICAL CARE IF YOU HAVE THE FOLLOWING SYMPTOMS: vomiting, changes in your vision or speech, weakness in your arms or legs, trouble speaking or swallowing, chest pain, abdominal pain, shortness of breath, sweating, bleeding, headache, neck pain, or fever.    Headache    A headache is pain or discomfort felt around the head or neck area. The specific cause of a headache may not be found as there are many types including tension headaches, migraine headaches, and cluster headaches. Watch your condition for any changes. Things you can do to manage your pain include taking over the counter and prescription medications as instructed by your health care provider, lying down in a dark quiet room, limiting stress, getting regular sleep, and refraining from alcohol and tobacco products.    SEEK IMMEDIATE MEDICAL CARE IF YOU EXPERIENCE THE FOLLOWING SYMPTOMS: fever, vomiting, stiff neck, loss of vision, problems with speech, muscle weakness, loss of balance, trouble walking, pass out, or confusion.

## 2025-02-06 NOTE — ED CDU PROVIDER SUBSEQUENT DAY NOTE - PROGRESS NOTE DETAILS
MRI resulted.   Spoke with patient with  #[id number]  Patient feels well, no HA at this time. Per Neuro, can dc home

## 2025-02-18 ENCOUNTER — APPOINTMENT (OUTPATIENT)
Dept: NEUROLOGY | Facility: CLINIC | Age: 65
End: 2025-02-18
Payer: MEDICAID

## 2025-02-18 VITALS
DIASTOLIC BLOOD PRESSURE: 81 MMHG | BODY MASS INDEX: 34.27 KG/M2 | OXYGEN SATURATION: 100 % | SYSTOLIC BLOOD PRESSURE: 135 MMHG | WEIGHT: 170 LBS | HEIGHT: 59 IN | HEART RATE: 80 BPM | TEMPERATURE: 97.6 F

## 2025-02-18 DIAGNOSIS — I82.451 RT PERONEAL VEIN ACUTE EMBOLISM AND THROMBOSIS: ICD-10-CM

## 2025-02-18 DIAGNOSIS — I63.9 CEREBRAL INFARCTION, UNSPECIFIED: ICD-10-CM

## 2025-02-18 PROCEDURE — T1013A: CUSTOM

## 2025-02-18 PROCEDURE — G2211 COMPLEX E/M VISIT ADD ON: CPT | Mod: NC

## 2025-02-18 PROCEDURE — 99215 OFFICE O/P EST HI 40 MIN: CPT

## 2025-02-18 RX ORDER — FAMOTIDINE 40 MG/1
40 TABLET, FILM COATED ORAL DAILY
Refills: 0 | Status: ACTIVE | COMMUNITY

## 2025-02-18 RX ORDER — ASPIRIN 81 MG
81 TABLET, DELAYED RELEASE (ENTERIC COATED) ORAL DAILY
Refills: 0 | Status: DISCONTINUED | COMMUNITY
End: 2025-02-18

## 2025-02-18 RX ORDER — ATORVASTATIN CALCIUM 40 MG/1
40 TABLET, FILM COATED ORAL DAILY
Qty: 30 | Refills: 3 | Status: ACTIVE | COMMUNITY
Start: 1900-01-01 | End: 1900-01-01

## 2025-02-18 RX ORDER — MECLIZINE HYDROCHLORIDE 12.5 MG/1
12.5 TABLET ORAL 3 TIMES DAILY
Refills: 0 | Status: ACTIVE | COMMUNITY

## 2025-03-03 ENCOUNTER — APPOINTMENT (OUTPATIENT)
Dept: VASCULAR SURGERY | Facility: CLINIC | Age: 65
End: 2025-03-03
Payer: MEDICAID

## 2025-03-03 ENCOUNTER — LABORATORY RESULT (OUTPATIENT)
Age: 65
End: 2025-03-03

## 2025-03-03 VITALS — BODY MASS INDEX: 34.27 KG/M2 | HEIGHT: 59 IN | WEIGHT: 170 LBS

## 2025-03-03 VITALS — DIASTOLIC BLOOD PRESSURE: 81 MMHG | SYSTOLIC BLOOD PRESSURE: 131 MMHG

## 2025-03-03 DIAGNOSIS — M79.89 OTHER SPECIFIED SOFT TISSUE DISORDERS: ICD-10-CM

## 2025-03-03 DIAGNOSIS — I82.451 RT PERONEAL VEIN ACUTE EMBOLISM AND THROMBOSIS: ICD-10-CM

## 2025-03-03 PROCEDURE — 99203 OFFICE O/P NEW LOW 30 MIN: CPT

## 2025-03-03 PROCEDURE — 93971 EXTREMITY STUDY: CPT

## 2025-03-10 ENCOUNTER — APPOINTMENT (OUTPATIENT)
Dept: CARDIOLOGY | Facility: CLINIC | Age: 65
End: 2025-03-10
Payer: MEDICAID

## 2025-03-10 ENCOUNTER — NON-APPOINTMENT (OUTPATIENT)
Age: 65
End: 2025-03-10

## 2025-03-10 PROCEDURE — 93298 REM INTERROG DEV EVAL SCRMS: CPT

## 2025-03-12 ENCOUNTER — OUTPATIENT (OUTPATIENT)
Dept: OUTPATIENT SERVICES | Facility: HOSPITAL | Age: 65
LOS: 1 days | End: 2025-03-12
Payer: MEDICAID

## 2025-03-12 DIAGNOSIS — Z90.49 ACQUIRED ABSENCE OF OTHER SPECIFIED PARTS OF DIGESTIVE TRACT: Chronic | ICD-10-CM

## 2025-03-12 DIAGNOSIS — Z98.890 OTHER SPECIFIED POSTPROCEDURAL STATES: Chronic | ICD-10-CM

## 2025-03-12 DIAGNOSIS — M25.511 PAIN IN RIGHT SHOULDER: ICD-10-CM

## 2025-03-12 DIAGNOSIS — I63.9 CEREBRAL INFARCTION, UNSPECIFIED: ICD-10-CM

## 2025-03-12 PROCEDURE — 73030 X-RAY EXAM OF SHOULDER: CPT | Mod: 26,RT

## 2025-03-12 PROCEDURE — 73030 X-RAY EXAM OF SHOULDER: CPT | Mod: RT

## 2025-03-12 PROCEDURE — 99204 OFFICE O/P NEW MOD 45 MIN: CPT

## 2025-03-13 DIAGNOSIS — M25.511 PAIN IN RIGHT SHOULDER: ICD-10-CM

## 2025-03-13 DIAGNOSIS — I63.9 CEREBRAL INFARCTION, UNSPECIFIED: ICD-10-CM

## 2025-03-18 ENCOUNTER — APPOINTMENT (OUTPATIENT)
Dept: NEUROLOGY | Facility: CLINIC | Age: 65
End: 2025-03-18

## 2025-03-31 ENCOUNTER — OUTPATIENT (OUTPATIENT)
Dept: OUTPATIENT SERVICES | Facility: HOSPITAL | Age: 65
LOS: 1 days | End: 2025-03-31
Payer: MEDICAID

## 2025-03-31 DIAGNOSIS — G46.4 CEREBELLAR STROKE SYNDROME: ICD-10-CM

## 2025-03-31 DIAGNOSIS — Z98.890 OTHER SPECIFIED POSTPROCEDURAL STATES: Chronic | ICD-10-CM

## 2025-03-31 DIAGNOSIS — Z90.49 ACQUIRED ABSENCE OF OTHER SPECIFIED PARTS OF DIGESTIVE TRACT: Chronic | ICD-10-CM

## 2025-03-31 DIAGNOSIS — M25.512 PAIN IN LEFT SHOULDER: ICD-10-CM

## 2025-03-31 PROCEDURE — 97110 THERAPEUTIC EXERCISES: CPT | Mod: GO

## 2025-03-31 PROCEDURE — 97165 OT EVAL LOW COMPLEX 30 MIN: CPT | Mod: GO

## 2025-04-01 DIAGNOSIS — G46.4 CEREBELLAR STROKE SYNDROME: ICD-10-CM

## 2025-04-01 DIAGNOSIS — M25.512 PAIN IN LEFT SHOULDER: ICD-10-CM

## 2025-04-04 ENCOUNTER — OUTPATIENT (OUTPATIENT)
Dept: OUTPATIENT SERVICES | Facility: HOSPITAL | Age: 65
LOS: 1 days | End: 2025-04-04
Payer: MEDICAID

## 2025-04-04 DIAGNOSIS — Z98.890 OTHER SPECIFIED POSTPROCEDURAL STATES: Chronic | ICD-10-CM

## 2025-04-04 DIAGNOSIS — G46.4 CEREBELLAR STROKE SYNDROME: ICD-10-CM

## 2025-04-04 DIAGNOSIS — M25.512 PAIN IN LEFT SHOULDER: ICD-10-CM

## 2025-04-04 DIAGNOSIS — Z90.49 ACQUIRED ABSENCE OF OTHER SPECIFIED PARTS OF DIGESTIVE TRACT: Chronic | ICD-10-CM

## 2025-04-04 PROCEDURE — 97110 THERAPEUTIC EXERCISES: CPT | Mod: GO

## 2025-04-05 ENCOUNTER — OUTPATIENT (OUTPATIENT)
Dept: OUTPATIENT SERVICES | Facility: HOSPITAL | Age: 65
LOS: 1 days | End: 2025-04-05
Payer: MEDICAID

## 2025-04-05 ENCOUNTER — APPOINTMENT (OUTPATIENT)
Dept: INTERNAL MEDICINE | Facility: CLINIC | Age: 65
End: 2025-04-05
Payer: MEDICAID

## 2025-04-05 VITALS
TEMPERATURE: 97.7 F | HEIGHT: 59 IN | SYSTOLIC BLOOD PRESSURE: 133 MMHG | DIASTOLIC BLOOD PRESSURE: 81 MMHG | WEIGHT: 176 LBS | OXYGEN SATURATION: 97 % | BODY MASS INDEX: 35.48 KG/M2 | HEART RATE: 78 BPM

## 2025-04-05 DIAGNOSIS — Z00.00 ENCOUNTER FOR GENERAL ADULT MEDICAL EXAMINATION WITHOUT ABNORMAL FINDINGS: ICD-10-CM

## 2025-04-05 DIAGNOSIS — I63.9 CEREBRAL INFARCTION, UNSPECIFIED: ICD-10-CM

## 2025-04-05 DIAGNOSIS — Z90.49 ACQUIRED ABSENCE OF OTHER SPECIFIED PARTS OF DIGESTIVE TRACT: Chronic | ICD-10-CM

## 2025-04-05 DIAGNOSIS — M25.512 PAIN IN LEFT SHOULDER: ICD-10-CM

## 2025-04-05 DIAGNOSIS — Z98.890 OTHER SPECIFIED POSTPROCEDURAL STATES: Chronic | ICD-10-CM

## 2025-04-05 DIAGNOSIS — G46.4 CEREBELLAR STROKE SYNDROME: ICD-10-CM

## 2025-04-05 PROCEDURE — G2211 COMPLEX E/M VISIT ADD ON: CPT | Mod: NC

## 2025-04-05 PROCEDURE — 99213 OFFICE O/P EST LOW 20 MIN: CPT

## 2025-04-07 ENCOUNTER — OUTPATIENT (OUTPATIENT)
Dept: OUTPATIENT SERVICES | Facility: HOSPITAL | Age: 65
LOS: 1 days | End: 2025-04-07
Payer: MEDICAID

## 2025-04-07 DIAGNOSIS — G46.4 CEREBELLAR STROKE SYNDROME: ICD-10-CM

## 2025-04-07 DIAGNOSIS — M25.512 PAIN IN LEFT SHOULDER: ICD-10-CM

## 2025-04-07 DIAGNOSIS — Z90.49 ACQUIRED ABSENCE OF OTHER SPECIFIED PARTS OF DIGESTIVE TRACT: Chronic | ICD-10-CM

## 2025-04-07 DIAGNOSIS — Z98.890 OTHER SPECIFIED POSTPROCEDURAL STATES: Chronic | ICD-10-CM

## 2025-04-07 PROCEDURE — 97110 THERAPEUTIC EXERCISES: CPT | Mod: GO

## 2025-04-08 DIAGNOSIS — I63.9 CEREBRAL INFARCTION, UNSPECIFIED: ICD-10-CM

## 2025-04-11 ENCOUNTER — OUTPATIENT (OUTPATIENT)
Dept: OUTPATIENT SERVICES | Facility: HOSPITAL | Age: 65
LOS: 1 days | End: 2025-04-11
Payer: MEDICAID

## 2025-04-11 DIAGNOSIS — M25.512 PAIN IN LEFT SHOULDER: ICD-10-CM

## 2025-04-11 DIAGNOSIS — Z90.49 ACQUIRED ABSENCE OF OTHER SPECIFIED PARTS OF DIGESTIVE TRACT: Chronic | ICD-10-CM

## 2025-04-11 DIAGNOSIS — Z98.890 OTHER SPECIFIED POSTPROCEDURAL STATES: Chronic | ICD-10-CM

## 2025-04-11 DIAGNOSIS — G46.4 CEREBELLAR STROKE SYNDROME: ICD-10-CM

## 2025-04-11 PROCEDURE — 97110 THERAPEUTIC EXERCISES: CPT | Mod: GO

## 2025-04-14 ENCOUNTER — NON-APPOINTMENT (OUTPATIENT)
Age: 65
End: 2025-04-14

## 2025-04-14 ENCOUNTER — APPOINTMENT (OUTPATIENT)
Dept: CARDIOLOGY | Facility: CLINIC | Age: 65
End: 2025-04-14
Payer: MEDICAID

## 2025-04-14 ENCOUNTER — OUTPATIENT (OUTPATIENT)
Dept: OUTPATIENT SERVICES | Facility: HOSPITAL | Age: 65
LOS: 1 days | End: 2025-04-14

## 2025-04-14 ENCOUNTER — OUTPATIENT (OUTPATIENT)
Dept: OUTPATIENT SERVICES | Facility: HOSPITAL | Age: 65
LOS: 1 days | End: 2025-04-14
Payer: MEDICAID

## 2025-04-14 DIAGNOSIS — G46.4 CEREBELLAR STROKE SYNDROME: ICD-10-CM

## 2025-04-14 DIAGNOSIS — Z98.890 OTHER SPECIFIED POSTPROCEDURAL STATES: Chronic | ICD-10-CM

## 2025-04-14 DIAGNOSIS — I63.9 CEREBRAL INFARCTION, UNSPECIFIED: ICD-10-CM

## 2025-04-14 DIAGNOSIS — M25.512 PAIN IN LEFT SHOULDER: ICD-10-CM

## 2025-04-14 DIAGNOSIS — Z90.49 ACQUIRED ABSENCE OF OTHER SPECIFIED PARTS OF DIGESTIVE TRACT: Chronic | ICD-10-CM

## 2025-04-14 PROCEDURE — 84443 ASSAY THYROID STIM HORMONE: CPT

## 2025-04-14 PROCEDURE — 80053 COMPREHEN METABOLIC PANEL: CPT

## 2025-04-14 PROCEDURE — 93298 REM INTERROG DEV EVAL SCRMS: CPT

## 2025-04-14 PROCEDURE — 80061 LIPID PANEL: CPT

## 2025-04-14 PROCEDURE — 82043 UR ALBUMIN QUANTITATIVE: CPT

## 2025-04-14 PROCEDURE — 83036 HEMOGLOBIN GLYCOSYLATED A1C: CPT

## 2025-04-14 PROCEDURE — 82306 VITAMIN D 25 HYDROXY: CPT

## 2025-04-15 DIAGNOSIS — I63.9 CEREBRAL INFARCTION, UNSPECIFIED: ICD-10-CM

## 2025-04-18 ENCOUNTER — OUTPATIENT (OUTPATIENT)
Dept: OUTPATIENT SERVICES | Facility: HOSPITAL | Age: 65
LOS: 1 days | End: 2025-04-18
Payer: MEDICAID

## 2025-04-18 DIAGNOSIS — M25.512 PAIN IN LEFT SHOULDER: ICD-10-CM

## 2025-04-18 DIAGNOSIS — Z90.49 ACQUIRED ABSENCE OF OTHER SPECIFIED PARTS OF DIGESTIVE TRACT: Chronic | ICD-10-CM

## 2025-04-18 DIAGNOSIS — Z98.890 OTHER SPECIFIED POSTPROCEDURAL STATES: Chronic | ICD-10-CM

## 2025-04-18 DIAGNOSIS — G46.4 CEREBELLAR STROKE SYNDROME: ICD-10-CM

## 2025-04-18 PROCEDURE — 97110 THERAPEUTIC EXERCISES: CPT | Mod: GO

## 2025-04-23 ENCOUNTER — OUTPATIENT (OUTPATIENT)
Dept: OUTPATIENT SERVICES | Facility: HOSPITAL | Age: 65
LOS: 1 days | End: 2025-04-23
Payer: MEDICAID

## 2025-04-23 DIAGNOSIS — Z98.890 OTHER SPECIFIED POSTPROCEDURAL STATES: Chronic | ICD-10-CM

## 2025-04-23 DIAGNOSIS — M25.512 PAIN IN LEFT SHOULDER: ICD-10-CM

## 2025-04-23 DIAGNOSIS — G46.4 CEREBELLAR STROKE SYNDROME: ICD-10-CM

## 2025-04-23 DIAGNOSIS — Z90.49 ACQUIRED ABSENCE OF OTHER SPECIFIED PARTS OF DIGESTIVE TRACT: Chronic | ICD-10-CM

## 2025-04-23 PROCEDURE — 97110 THERAPEUTIC EXERCISES: CPT | Mod: GO

## 2025-04-25 ENCOUNTER — OUTPATIENT (OUTPATIENT)
Dept: OUTPATIENT SERVICES | Facility: HOSPITAL | Age: 65
LOS: 1 days | End: 2025-04-25
Payer: MEDICAID

## 2025-04-25 DIAGNOSIS — M25.512 PAIN IN LEFT SHOULDER: ICD-10-CM

## 2025-04-25 DIAGNOSIS — G46.4 CEREBELLAR STROKE SYNDROME: ICD-10-CM

## 2025-04-25 DIAGNOSIS — Z90.49 ACQUIRED ABSENCE OF OTHER SPECIFIED PARTS OF DIGESTIVE TRACT: Chronic | ICD-10-CM

## 2025-04-25 DIAGNOSIS — Z98.890 OTHER SPECIFIED POSTPROCEDURAL STATES: Chronic | ICD-10-CM

## 2025-04-25 PROCEDURE — 97110 THERAPEUTIC EXERCISES: CPT | Mod: GO

## 2025-04-28 ENCOUNTER — OUTPATIENT (OUTPATIENT)
Dept: OUTPATIENT SERVICES | Facility: HOSPITAL | Age: 65
LOS: 1 days | End: 2025-04-28
Payer: MEDICAID

## 2025-04-28 DIAGNOSIS — G46.4 CEREBELLAR STROKE SYNDROME: ICD-10-CM

## 2025-04-28 DIAGNOSIS — Z98.890 OTHER SPECIFIED POSTPROCEDURAL STATES: Chronic | ICD-10-CM

## 2025-04-28 DIAGNOSIS — M25.512 PAIN IN LEFT SHOULDER: ICD-10-CM

## 2025-04-28 DIAGNOSIS — Z90.49 ACQUIRED ABSENCE OF OTHER SPECIFIED PARTS OF DIGESTIVE TRACT: Chronic | ICD-10-CM

## 2025-04-28 PROCEDURE — 97110 THERAPEUTIC EXERCISES: CPT | Mod: GO

## 2025-05-02 ENCOUNTER — OUTPATIENT (OUTPATIENT)
Dept: OUTPATIENT SERVICES | Facility: HOSPITAL | Age: 65
LOS: 1 days | End: 2025-05-02
Payer: MEDICAID

## 2025-05-02 DIAGNOSIS — Z90.49 ACQUIRED ABSENCE OF OTHER SPECIFIED PARTS OF DIGESTIVE TRACT: Chronic | ICD-10-CM

## 2025-05-02 DIAGNOSIS — Z98.890 OTHER SPECIFIED POSTPROCEDURAL STATES: Chronic | ICD-10-CM

## 2025-05-02 DIAGNOSIS — M25.512 PAIN IN LEFT SHOULDER: ICD-10-CM

## 2025-05-02 DIAGNOSIS — G46.4 CEREBELLAR STROKE SYNDROME: ICD-10-CM

## 2025-05-02 PROCEDURE — 97110 THERAPEUTIC EXERCISES: CPT | Mod: GO

## 2025-05-03 DIAGNOSIS — M25.512 PAIN IN LEFT SHOULDER: ICD-10-CM

## 2025-05-03 DIAGNOSIS — G46.4 CEREBELLAR STROKE SYNDROME: ICD-10-CM

## 2025-05-05 ENCOUNTER — OUTPATIENT (OUTPATIENT)
Dept: OUTPATIENT SERVICES | Facility: HOSPITAL | Age: 65
LOS: 1 days | End: 2025-05-05
Payer: MEDICAID

## 2025-05-05 DIAGNOSIS — M25.512 PAIN IN LEFT SHOULDER: ICD-10-CM

## 2025-05-05 DIAGNOSIS — Z98.890 OTHER SPECIFIED POSTPROCEDURAL STATES: Chronic | ICD-10-CM

## 2025-05-05 DIAGNOSIS — Z90.49 ACQUIRED ABSENCE OF OTHER SPECIFIED PARTS OF DIGESTIVE TRACT: Chronic | ICD-10-CM

## 2025-05-05 DIAGNOSIS — G46.4 CEREBELLAR STROKE SYNDROME: ICD-10-CM

## 2025-05-05 PROCEDURE — 97110 THERAPEUTIC EXERCISES: CPT | Mod: GO

## 2025-05-09 ENCOUNTER — OUTPATIENT (OUTPATIENT)
Dept: OUTPATIENT SERVICES | Facility: HOSPITAL | Age: 65
LOS: 1 days | End: 2025-05-09
Payer: MEDICAID

## 2025-05-09 DIAGNOSIS — Z98.890 OTHER SPECIFIED POSTPROCEDURAL STATES: Chronic | ICD-10-CM

## 2025-05-09 DIAGNOSIS — G46.4 CEREBELLAR STROKE SYNDROME: ICD-10-CM

## 2025-05-09 DIAGNOSIS — M25.512 PAIN IN LEFT SHOULDER: ICD-10-CM

## 2025-05-09 PROCEDURE — 97110 THERAPEUTIC EXERCISES: CPT | Mod: GO

## 2025-05-12 ENCOUNTER — OUTPATIENT (OUTPATIENT)
Dept: OUTPATIENT SERVICES | Facility: HOSPITAL | Age: 65
LOS: 1 days | End: 2025-05-12
Payer: MEDICAID

## 2025-05-12 DIAGNOSIS — Z90.49 ACQUIRED ABSENCE OF OTHER SPECIFIED PARTS OF DIGESTIVE TRACT: Chronic | ICD-10-CM

## 2025-05-12 DIAGNOSIS — G46.4 CEREBELLAR STROKE SYNDROME: ICD-10-CM

## 2025-05-12 DIAGNOSIS — M25.512 PAIN IN LEFT SHOULDER: ICD-10-CM

## 2025-05-12 DIAGNOSIS — Z98.890 OTHER SPECIFIED POSTPROCEDURAL STATES: Chronic | ICD-10-CM

## 2025-05-12 PROCEDURE — 97110 THERAPEUTIC EXERCISES: CPT | Mod: GO

## 2025-05-15 ENCOUNTER — APPOINTMENT (OUTPATIENT)
Dept: CARDIOLOGY | Facility: CLINIC | Age: 65
End: 2025-05-15
Payer: MEDICAID

## 2025-05-15 ENCOUNTER — NON-APPOINTMENT (OUTPATIENT)
Age: 65
End: 2025-05-15

## 2025-05-15 PROCEDURE — 93298 REM INTERROG DEV EVAL SCRMS: CPT

## 2025-05-16 ENCOUNTER — OUTPATIENT (OUTPATIENT)
Dept: OUTPATIENT SERVICES | Facility: HOSPITAL | Age: 65
LOS: 1 days | End: 2025-05-16
Payer: MEDICAID

## 2025-05-16 DIAGNOSIS — G46.4 CEREBELLAR STROKE SYNDROME: ICD-10-CM

## 2025-05-16 DIAGNOSIS — Z90.49 ACQUIRED ABSENCE OF OTHER SPECIFIED PARTS OF DIGESTIVE TRACT: Chronic | ICD-10-CM

## 2025-05-16 DIAGNOSIS — M25.512 PAIN IN LEFT SHOULDER: ICD-10-CM

## 2025-05-16 DIAGNOSIS — Z98.890 OTHER SPECIFIED POSTPROCEDURAL STATES: Chronic | ICD-10-CM

## 2025-05-16 PROCEDURE — 97110 THERAPEUTIC EXERCISES: CPT | Mod: GO

## 2025-05-21 ENCOUNTER — OUTPATIENT (OUTPATIENT)
Dept: OUTPATIENT SERVICES | Facility: HOSPITAL | Age: 65
LOS: 1 days | End: 2025-05-21
Payer: MEDICAID

## 2025-05-21 DIAGNOSIS — Z98.890 OTHER SPECIFIED POSTPROCEDURAL STATES: Chronic | ICD-10-CM

## 2025-05-21 DIAGNOSIS — Z90.49 ACQUIRED ABSENCE OF OTHER SPECIFIED PARTS OF DIGESTIVE TRACT: Chronic | ICD-10-CM

## 2025-05-21 DIAGNOSIS — G46.4 CEREBELLAR STROKE SYNDROME: ICD-10-CM

## 2025-05-21 DIAGNOSIS — M25.512 PAIN IN LEFT SHOULDER: ICD-10-CM

## 2025-05-21 PROCEDURE — 97110 THERAPEUTIC EXERCISES: CPT | Mod: GO

## 2025-05-23 ENCOUNTER — OUTPATIENT (OUTPATIENT)
Dept: OUTPATIENT SERVICES | Facility: HOSPITAL | Age: 65
LOS: 1 days | End: 2025-05-23
Payer: MEDICAID

## 2025-05-23 DIAGNOSIS — Z90.49 ACQUIRED ABSENCE OF OTHER SPECIFIED PARTS OF DIGESTIVE TRACT: Chronic | ICD-10-CM

## 2025-05-23 DIAGNOSIS — G46.4 CEREBELLAR STROKE SYNDROME: ICD-10-CM

## 2025-05-23 DIAGNOSIS — M25.512 PAIN IN LEFT SHOULDER: ICD-10-CM

## 2025-05-23 DIAGNOSIS — Z98.890 OTHER SPECIFIED POSTPROCEDURAL STATES: Chronic | ICD-10-CM

## 2025-05-23 PROCEDURE — 97110 THERAPEUTIC EXERCISES: CPT | Mod: GO

## 2025-05-28 ENCOUNTER — OUTPATIENT (OUTPATIENT)
Dept: OUTPATIENT SERVICES | Facility: HOSPITAL | Age: 65
LOS: 1 days | End: 2025-05-28
Payer: MEDICAID

## 2025-05-28 DIAGNOSIS — M25.512 PAIN IN LEFT SHOULDER: ICD-10-CM

## 2025-05-28 DIAGNOSIS — G46.4 CEREBELLAR STROKE SYNDROME: ICD-10-CM

## 2025-05-28 DIAGNOSIS — Z90.49 ACQUIRED ABSENCE OF OTHER SPECIFIED PARTS OF DIGESTIVE TRACT: Chronic | ICD-10-CM

## 2025-05-28 DIAGNOSIS — Z98.890 OTHER SPECIFIED POSTPROCEDURAL STATES: Chronic | ICD-10-CM

## 2025-05-28 PROCEDURE — 97110 THERAPEUTIC EXERCISES: CPT | Mod: GO

## 2025-05-30 ENCOUNTER — OUTPATIENT (OUTPATIENT)
Dept: OUTPATIENT SERVICES | Facility: HOSPITAL | Age: 65
LOS: 1 days | End: 2025-05-30
Payer: MEDICAID

## 2025-05-30 DIAGNOSIS — Z98.890 OTHER SPECIFIED POSTPROCEDURAL STATES: Chronic | ICD-10-CM

## 2025-05-30 DIAGNOSIS — G46.4 CEREBELLAR STROKE SYNDROME: ICD-10-CM

## 2025-05-30 DIAGNOSIS — M25.512 PAIN IN LEFT SHOULDER: ICD-10-CM

## 2025-05-30 DIAGNOSIS — Z90.49 ACQUIRED ABSENCE OF OTHER SPECIFIED PARTS OF DIGESTIVE TRACT: Chronic | ICD-10-CM

## 2025-05-30 PROCEDURE — 97110 THERAPEUTIC EXERCISES: CPT | Mod: GO

## 2025-06-02 ENCOUNTER — OUTPATIENT (OUTPATIENT)
Dept: OUTPATIENT SERVICES | Facility: HOSPITAL | Age: 65
LOS: 1 days | End: 2025-06-02
Payer: MEDICAID

## 2025-06-02 DIAGNOSIS — Z98.890 OTHER SPECIFIED POSTPROCEDURAL STATES: Chronic | ICD-10-CM

## 2025-06-02 DIAGNOSIS — Z90.49 ACQUIRED ABSENCE OF OTHER SPECIFIED PARTS OF DIGESTIVE TRACT: Chronic | ICD-10-CM

## 2025-06-02 DIAGNOSIS — G46.4 CEREBELLAR STROKE SYNDROME: ICD-10-CM

## 2025-06-02 DIAGNOSIS — M25.512 PAIN IN LEFT SHOULDER: ICD-10-CM

## 2025-06-02 PROCEDURE — 97110 THERAPEUTIC EXERCISES: CPT | Mod: GO

## 2025-06-03 DIAGNOSIS — M25.512 PAIN IN LEFT SHOULDER: ICD-10-CM

## 2025-06-03 DIAGNOSIS — G46.4 CEREBELLAR STROKE SYNDROME: ICD-10-CM

## 2025-06-04 ENCOUNTER — TRANSCRIPTION ENCOUNTER (OUTPATIENT)
Age: 65
End: 2025-06-04

## 2025-06-06 ENCOUNTER — OUTPATIENT (OUTPATIENT)
Dept: OUTPATIENT SERVICES | Facility: HOSPITAL | Age: 65
LOS: 1 days | End: 2025-06-06
Payer: MEDICAID

## 2025-06-06 DIAGNOSIS — G46.4 CEREBELLAR STROKE SYNDROME: ICD-10-CM

## 2025-06-06 DIAGNOSIS — Z90.49 ACQUIRED ABSENCE OF OTHER SPECIFIED PARTS OF DIGESTIVE TRACT: Chronic | ICD-10-CM

## 2025-06-06 DIAGNOSIS — M25.512 PAIN IN LEFT SHOULDER: ICD-10-CM

## 2025-06-06 DIAGNOSIS — Z98.890 OTHER SPECIFIED POSTPROCEDURAL STATES: Chronic | ICD-10-CM

## 2025-06-06 PROCEDURE — 97110 THERAPEUTIC EXERCISES: CPT | Mod: GO

## 2025-06-09 ENCOUNTER — APPOINTMENT (OUTPATIENT)
Dept: NEUROLOGY | Facility: CLINIC | Age: 65
End: 2025-06-09
Payer: MEDICAID

## 2025-06-09 VITALS
BODY MASS INDEX: 33.26 KG/M2 | HEART RATE: 79 BPM | WEIGHT: 165 LBS | DIASTOLIC BLOOD PRESSURE: 76 MMHG | OXYGEN SATURATION: 100 % | SYSTOLIC BLOOD PRESSURE: 131 MMHG | HEIGHT: 59 IN | TEMPERATURE: 98.6 F

## 2025-06-09 PROBLEM — R51.9 HEADACHE: Status: ACTIVE | Noted: 2025-06-09

## 2025-06-09 PROBLEM — H93.11 TINNITUS OF RIGHT EAR: Status: ACTIVE | Noted: 2025-06-09

## 2025-06-09 PROCEDURE — 99215 OFFICE O/P EST HI 40 MIN: CPT

## 2025-06-09 PROCEDURE — T1013A: CUSTOM

## 2025-06-09 PROCEDURE — G2211 COMPLEX E/M VISIT ADD ON: CPT | Mod: NC

## 2025-06-09 RX ORDER — ACETAMINOPHEN 325 MG/1
325 TABLET ORAL
Qty: 360 | Refills: 2 | Status: ACTIVE | COMMUNITY
Start: 2025-06-09 | End: 1900-01-01

## 2025-06-19 ENCOUNTER — APPOINTMENT (OUTPATIENT)
Dept: CARDIOLOGY | Facility: CLINIC | Age: 65
End: 2025-06-19
Payer: MEDICAID

## 2025-06-19 ENCOUNTER — NON-APPOINTMENT (OUTPATIENT)
Age: 65
End: 2025-06-19

## 2025-06-19 PROCEDURE — 93298 REM INTERROG DEV EVAL SCRMS: CPT

## 2025-07-24 ENCOUNTER — APPOINTMENT (OUTPATIENT)
Dept: CARDIOLOGY | Facility: CLINIC | Age: 65
End: 2025-07-24
Payer: MEDICAID

## 2025-07-24 ENCOUNTER — NON-APPOINTMENT (OUTPATIENT)
Age: 65
End: 2025-07-24

## 2025-07-24 PROCEDURE — 93298 REM INTERROG DEV EVAL SCRMS: CPT

## 2025-08-07 ENCOUNTER — APPOINTMENT (OUTPATIENT)
Dept: INTERNAL MEDICINE | Facility: CLINIC | Age: 65
End: 2025-08-07

## 2025-08-19 ENCOUNTER — APPOINTMENT (OUTPATIENT)
Dept: OTOLARYNGOLOGY | Facility: CLINIC | Age: 65
End: 2025-08-19
Payer: MEDICAID

## 2025-08-19 VITALS — BODY MASS INDEX: 33.26 KG/M2 | HEIGHT: 59 IN | WEIGHT: 165 LBS

## 2025-08-19 DIAGNOSIS — L29.9 PRURITUS, UNSPECIFIED: ICD-10-CM

## 2025-08-19 DIAGNOSIS — H90.A11 CONDUCTIVE HEARING LOSS, UNILATERAL, RIGHT EAR WITH RESTRICTED HEARING ON THE CONTRALATERAL SIDE: ICD-10-CM

## 2025-08-19 PROCEDURE — 92557 COMPREHENSIVE HEARING TEST: CPT

## 2025-08-19 PROCEDURE — 99214 OFFICE O/P EST MOD 30 MIN: CPT

## 2025-08-19 PROCEDURE — 92550 TYMPANOMETRY & REFLEX THRESH: CPT | Mod: 52

## 2025-08-19 RX ORDER — FLUOCINOLONE ACETONIDE 0.11 MG/ML
0.01 OIL AURICULAR (OTIC)
Qty: 1 | Refills: 2 | Status: ACTIVE | COMMUNITY
Start: 2025-08-19 | End: 1900-01-01

## 2025-08-26 ENCOUNTER — OUTPATIENT (OUTPATIENT)
Dept: OUTPATIENT SERVICES | Facility: HOSPITAL | Age: 65
LOS: 1 days | End: 2025-08-26
Payer: MEDICAID

## 2025-08-26 ENCOUNTER — APPOINTMENT (OUTPATIENT)
Dept: PULMONOLOGY | Facility: CLINIC | Age: 65
End: 2025-08-26

## 2025-08-26 VITALS
WEIGHT: 180 LBS | TEMPERATURE: 97.3 F | HEART RATE: 71 BPM | SYSTOLIC BLOOD PRESSURE: 120 MMHG | OXYGEN SATURATION: 96 % | HEIGHT: 60.5 IN | BODY MASS INDEX: 34.43 KG/M2 | DIASTOLIC BLOOD PRESSURE: 77 MMHG

## 2025-08-26 DIAGNOSIS — Z98.890 OTHER SPECIFIED POSTPROCEDURAL STATES: Chronic | ICD-10-CM

## 2025-08-26 DIAGNOSIS — G47.33 OBSTRUCTIVE SLEEP APNEA (ADULT) (PEDIATRIC): ICD-10-CM

## 2025-08-26 DIAGNOSIS — Z00.00 ENCOUNTER FOR GENERAL ADULT MEDICAL EXAMINATION WITHOUT ABNORMAL FINDINGS: ICD-10-CM

## 2025-08-26 DIAGNOSIS — Z90.49 ACQUIRED ABSENCE OF OTHER SPECIFIED PARTS OF DIGESTIVE TRACT: Chronic | ICD-10-CM

## 2025-08-26 PROCEDURE — G2211 COMPLEX E/M VISIT ADD ON: CPT | Mod: NC

## 2025-08-26 PROCEDURE — 99203 OFFICE O/P NEW LOW 30 MIN: CPT

## 2025-08-28 ENCOUNTER — APPOINTMENT (OUTPATIENT)
Dept: CARDIOLOGY | Facility: CLINIC | Age: 65
End: 2025-08-28
Payer: MEDICAID

## 2025-08-28 ENCOUNTER — NON-APPOINTMENT (OUTPATIENT)
Age: 65
End: 2025-08-28

## 2025-08-28 PROCEDURE — 93298 REM INTERROG DEV EVAL SCRMS: CPT

## 2025-09-05 DIAGNOSIS — G47.33 OBSTRUCTIVE SLEEP APNEA (ADULT) (PEDIATRIC): ICD-10-CM
